# Patient Record
Sex: MALE | Race: WHITE | NOT HISPANIC OR LATINO | Employment: OTHER | URBAN - METROPOLITAN AREA
[De-identification: names, ages, dates, MRNs, and addresses within clinical notes are randomized per-mention and may not be internally consistent; named-entity substitution may affect disease eponyms.]

---

## 2017-11-10 ENCOUNTER — TELEPHONE (OUTPATIENT)
Dept: GASTROENTEROLOGY | Facility: CLINIC | Age: 67
End: 2017-11-10

## 2017-11-10 DIAGNOSIS — Z86.010 HX OF COLONIC POLYPS: Primary | ICD-10-CM

## 2017-11-16 PROBLEM — Z86.010 HX OF COLONIC POLYPS: Status: ACTIVE | Noted: 2017-11-16

## 2017-11-16 PROBLEM — Z86.0100 HX OF COLONIC POLYPS: Status: ACTIVE | Noted: 2017-11-16

## 2017-11-16 NOTE — TELEPHONE ENCOUNTER
Patient has been scheduled on 12/06/17 to arrive at 7:15am. Instructions have been mailed to them.

## 2017-12-05 ENCOUNTER — ANESTHESIA EVENT (OUTPATIENT)
Dept: PERIOP | Facility: HOSPITAL | Age: 67
End: 2017-12-05

## 2017-12-06 ENCOUNTER — HOSPITAL ENCOUNTER (OUTPATIENT)
Facility: HOSPITAL | Age: 67
Setting detail: HOSPITAL OUTPATIENT SURGERY
Discharge: HOME | End: 2017-12-06
Attending: INTERNAL MEDICINE | Admitting: INTERNAL MEDICINE

## 2017-12-06 ENCOUNTER — ANESTHESIA (OUTPATIENT)
Dept: PERIOP | Facility: HOSPITAL | Age: 67
End: 2017-12-06

## 2017-12-06 VITALS
SYSTOLIC BLOOD PRESSURE: 130 MMHG | HEIGHT: 64 IN | RESPIRATION RATE: 14 BRPM | TEMPERATURE: 97.6 F | WEIGHT: 191 LBS | HEART RATE: 40 BPM | DIASTOLIC BLOOD PRESSURE: 71 MMHG | OXYGEN SATURATION: 96 % | BODY MASS INDEX: 32.61 KG/M2

## 2017-12-06 LAB — GLUCOSE BLDC GLUCOMTR-MCNC: 101 MG/DL (ref 70–130)

## 2017-12-06 PROCEDURE — G0105 COLORECTAL SCRN; HI RISK IND: HCPCS | Performed by: INTERNAL MEDICINE

## 2017-12-06 PROCEDURE — 82962 GLUCOSE BLOOD TEST: CPT

## 2017-12-06 PROCEDURE — 25010000002 PROPOFOL 10 MG/ML EMULSION: Performed by: ANESTHESIOLOGY

## 2017-12-06 RX ORDER — LIDOCAINE HYDROCHLORIDE 10 MG/ML
0.5 INJECTION, SOLUTION EPIDURAL; INFILTRATION; INTRACAUDAL; PERINEURAL ONCE AS NEEDED
Status: COMPLETED | OUTPATIENT
Start: 2017-12-06 | End: 2017-12-06

## 2017-12-06 RX ORDER — PROPOFOL 10 MG/ML
VIAL (ML) INTRAVENOUS AS NEEDED
Status: DISCONTINUED | OUTPATIENT
Start: 2017-12-06 | End: 2017-12-06 | Stop reason: SURG

## 2017-12-06 RX ORDER — MAGNESIUM HYDROXIDE 1200 MG/15ML
LIQUID ORAL AS NEEDED
Status: DISCONTINUED | OUTPATIENT
Start: 2017-12-06 | End: 2017-12-06 | Stop reason: HOSPADM

## 2017-12-06 RX ORDER — LIDOCAINE HYDROCHLORIDE 20 MG/ML
INJECTION, SOLUTION INFILTRATION; PERINEURAL AS NEEDED
Status: DISCONTINUED | OUTPATIENT
Start: 2017-12-06 | End: 2017-12-06 | Stop reason: SURG

## 2017-12-06 RX ORDER — SODIUM CHLORIDE, SODIUM LACTATE, POTASSIUM CHLORIDE, CALCIUM CHLORIDE 600; 310; 30; 20 MG/100ML; MG/100ML; MG/100ML; MG/100ML
9 INJECTION, SOLUTION INTRAVENOUS CONTINUOUS
Status: DISCONTINUED | OUTPATIENT
Start: 2017-12-06 | End: 2017-12-06 | Stop reason: HOSPADM

## 2017-12-06 RX ORDER — SODIUM CHLORIDE 0.9 % (FLUSH) 0.9 %
1-10 SYRINGE (ML) INJECTION AS NEEDED
Status: DISCONTINUED | OUTPATIENT
Start: 2017-12-06 | End: 2017-12-06 | Stop reason: HOSPADM

## 2017-12-06 RX ORDER — GLYCOPYRROLATE 0.2 MG/ML
INJECTION INTRAMUSCULAR; INTRAVENOUS AS NEEDED
Status: DISCONTINUED | OUTPATIENT
Start: 2017-12-06 | End: 2017-12-06 | Stop reason: SURG

## 2017-12-06 RX ORDER — SODIUM CHLORIDE 9 MG/ML
40 INJECTION, SOLUTION INTRAVENOUS AS NEEDED
Status: DISCONTINUED | OUTPATIENT
Start: 2017-12-06 | End: 2017-12-06 | Stop reason: HOSPADM

## 2017-12-06 RX ADMIN — LIDOCAINE HYDROCHLORIDE 60 MG: 20 INJECTION, SOLUTION INFILTRATION; PERINEURAL at 08:33

## 2017-12-06 RX ADMIN — GLYCOPYRROLATE 0.2 MG: 0.2 INJECTION INTRAMUSCULAR; INTRAVENOUS at 08:30

## 2017-12-06 RX ADMIN — PROPOFOL 200 MG: 10 INJECTION, EMULSION INTRAVENOUS at 08:33

## 2017-12-06 RX ADMIN — LIDOCAINE HYDROCHLORIDE 0.5 ML: 10 INJECTION, SOLUTION EPIDURAL; INFILTRATION; INTRACAUDAL; PERINEURAL at 07:28

## 2017-12-06 RX ADMIN — SODIUM CHLORIDE, POTASSIUM CHLORIDE, SODIUM LACTATE AND CALCIUM CHLORIDE 9 ML/HR: 600; 310; 30; 20 INJECTION, SOLUTION INTRAVENOUS at 07:31

## 2017-12-06 NOTE — ANESTHESIA POSTPROCEDURE EVALUATION
Patient: NATIVIDAD Fox    Procedure Summary     Date Anesthesia Start Anesthesia Stop Room / Location    12/06/17 0829 0900 [facility] / [facility]       Procedure Diagnosis Surgeon Provider    COLONOSCOPY (N/A ) Hx of colonic polyps  (Hx of colonic polyps [Z86.010]) MD Lisbeth March MD          Anesthesia Type: MAC  Last vitals  BP   130/71 (12/06/17 0930)   Temp   97.6 °F (36.4 °C) (12/06/17 0902)   Pulse   (!) 40 (12/06/17 0930)   Resp   14 (12/06/17 0930)     SpO2   96 % (12/06/17 0930)     Post Anesthesia Care and Evaluation    Patient location during evaluation: PHASE II  Patient participation: complete - patient participated  Level of consciousness: awake and alert  Pain score: 0  Pain management: adequate  Airway patency: patent  Anesthetic complications: No anesthetic complications  PONV Status: none  Cardiovascular status: acceptable  Respiratory status: acceptable  Hydration status: acceptable

## 2017-12-06 NOTE — OP NOTE
Colonoscopy Note:    Indication: History of colon polyps      Consent: Procedure colonoscopy was explained to the patient and detail including but not limited to the, patient of bleeding perforation and possible reactions to sedation.    Sedation: Sedation was provided by anesthesia.    Procedure:  After excellent sedation a digital rectal examinations performed and a flexible colonoscope was inserted into the rectum passed to the cecum.  The cecum was identified by both the ileocecal valve and the appendiceal orifice.  The overall bowel preparation was good.  Upon withdrawal scope careful examination mucosa was made.  Pertinent findings include no polyps are noted.  The scope was slowly withdrawn to the rectum and retroflex were internal hemorrhoids are noted.  The scope was straightened and withdrawn out of the patient with no immediate complications and he tolerated the procedure well.    Impression/Plan:  No polyps  Internal hemorrhoids  He can keep her screening at every 5 years due to his personal history of polyps

## 2017-12-06 NOTE — PLAN OF CARE
Problem: Patient Care Overview (Adult)  Goal: Plan of Care Review  Outcome: Ongoing (interventions implemented as appropriate)    12/06/17 0743   Coping/Psychosocial Response Interventions   Plan Of Care Reviewed With patient;spouse   Outcome Evaluation   Outcome Summary/Follow up Plan vss, awaiting procedure       Goal: Adult Individualization and Mutuality  Outcome: Ongoing (interventions implemented as appropriate)    Problem: GI Endoscopy (Adult)  Goal: Signs and Symptoms of Listed Potential Problems Will be Absent or Manageable (GI Endoscopy)  Outcome: Ongoing (interventions implemented as appropriate)

## 2017-12-06 NOTE — PLAN OF CARE
Problem: Patient Care Overview (Adult)  Goal: Plan of Care Review  Outcome: Outcome(s) achieved Date Met:  12/06/17 12/06/17 1006   Coping/Psychosocial Response Interventions   Plan Of Care Reviewed With patient   Outcome Evaluation   Outcome Summary/Follow up Plan vss       Goal: Adult Individualization and Mutuality  Outcome: Outcome(s) achieved Date Met:  12/06/17    Problem: GI Endoscopy (Adult)  Goal: Signs and Symptoms of Listed Potential Problems Will be Absent or Manageable (GI Endoscopy)  Outcome: Outcome(s) achieved Date Met:  12/06/17

## 2017-12-06 NOTE — PLAN OF CARE
Problem: GI Endoscopy (Adult)  Goal: Signs and Symptoms of Listed Potential Problems Will be Absent or Manageable (GI Endoscopy)  Outcome: Ongoing (interventions implemented as appropriate)    12/06/17 0811   GI Endoscopy   Problems Assessed (GI Endoscopy) all   Problems Present (GI Endoscopy) none

## 2017-12-06 NOTE — H&P
[facility]  Pre-procedure H&P    Chief Complaint:screening colon    NATIVIDAD Fox is a 67 y.o. male  who is referred by Rosalia James MD for a colonoscopy. He is an Asymptomatic person Age 50 years and older who has a history of screening for colon cancer.   He has had colonoscopy 10 years ago without abnormalities..    He denies any change in bowel function, melena, hematochezia or unexplained weight loss.    Past Medical History:   Diagnosis Date   • Colon polyp    • Diabetes mellitus        Past Surgical History:   Procedure Laterality Date   • COLONOSCOPY     • FRACTURE SURGERY Left 1972    FX: Femur   • TONSILLECTOMY         Family History   Problem Relation Age of Onset   • Colon cancer Neg Hx    • Colon polyps Neg Hx        Social History     Social History   • Marital status:      Spouse name: N/A   • Number of children: N/A   • Years of education: N/A     Occupational History   • Not on file.     Social History Main Topics   • Smoking status: Former Smoker   • Smokeless tobacco: Never Used   • Alcohol use Yes      Comment: social minimal   • Drug use: No   • Sexual activity: Defer     Other Topics Concern   • Not on file     Social History Narrative         Current Facility-Administered Medications:   •  lactated ringers infusion, 9 mL/hr, Intravenous, Continuous, Leonides Ewing CRNA, Last Rate: 9 mL/hr at 12/06/17 0731, 9 mL/hr at 12/06/17 0731  •  sodium chloride 0.9 % flush 1-10 mL, 1-10 mL, Intravenous, PRN, Leonides Ewing CRNA  •  sodium chloride 0.9 % infusion 40 mL, 40 mL, Intravenous, PRN, Leonides Ewing CRNA  •  sterile water 1,000 mL with simethicone 40 MG/0.6ML 3 mL mixture, , , PRN, Rosalia James MD, 150 mL at 12/06/17 0747  •  sterile water irrigation solution, , , PRN, Rosalia James MD, 500 mL at 12/06/17 0747    No Known Allergies         Vitals:    12/06/17 0715   BP: 93/73   Pulse: (!) 40   Resp: 16   Temp: 98.1 °F (36.7 °C)    SpO2: 96%       Physical Exam:   General: patient awake, alert and cooperative   Eyes: Normal lids and lashes, no scleral icterus   Neck: supple, normal ROM   Skin: warm and dry, not jaundiced   Cardiovascular: regular rhythm and rate, no murmurs auscultated   Pulm: clear to auscultation bilaterally, regular and unlabored   Abdomen: soft, nontender, nondistended; normal bowel sounds   Extremities: no rash or edema   Psychiatric: Normal mood and behavior         Assessment/Plan       [unfilled]    Schedule for colonoscopy.      Indications, risks and procedure were discussed with the patient, including but not limited to, bleeding, infection, possibility of perforation and possible polypectomy. All of the patient's questions were answered, and signed informed consent was obtained and placed on the chart.         Rosalia James MD  [facility]  [unfilled]

## 2017-12-06 NOTE — PLAN OF CARE
Problem: Patient Care Overview (Adult)  Goal: Plan of Care Review  Outcome: Ongoing (interventions implemented as appropriate)    12/06/17 0905   Coping/Psychosocial Response Interventions   Plan Of Care Reviewed With patient;spouse   Outcome Evaluation   Outcome Summary/Follow up Plan vss,passing air, tolerating po fluids   Patient Care Overview   Progress improving       Goal: Adult Individualization and Mutuality  Outcome: Ongoing (interventions implemented as appropriate)    Problem: GI Endoscopy (Adult)  Goal: Signs and Symptoms of Listed Potential Problems Will be Absent or Manageable (GI Endoscopy)  Outcome: Ongoing (interventions implemented as appropriate)

## 2017-12-06 NOTE — ANESTHESIA PREPROCEDURE EVALUATION
Anesthesia Evaluation     no history of anesthetic complications:  NPO Solid Status: > 8 hours  NPO Liquid Status: > 8 hours     Airway   Mallampati: II  TM distance: >3 FB  Neck ROM: full  Narrow palate  Dental - normal exam     Pulmonary - normal exam    breath sounds clear to auscultation  (+) a smoker (none for 40 years) Former,   Sleep apnea: snores.  Cardiovascular   Exercise tolerance: good (4-7 METS)    Rhythm: regular  Rate: abnormal    (+) hypertension, dysrhythmias (rate runs in the 40's) Bradycardia, hyperlipidemia      Neuro/Psych  GI/Hepatic/Renal/Endo    (+)  diabetes mellitus type 2 well controlled,     Musculoskeletal (-) negative ROS    Back pain: occ.  Abdominal    Substance History - negative use  Alcohol use: social-occ.     OB/GYN negative ob/gyn ROS         Other - negative ROS                                     Anesthesia Plan    ASA 2     MAC     intravenous induction   Anesthetic plan and risks discussed with patient and spouse/significant other.

## 2020-12-07 ENCOUNTER — OFFICE VISIT (OUTPATIENT)
Dept: INTERNAL MEDICINE | Facility: CLINIC | Age: 70
End: 2020-12-07

## 2020-12-07 VITALS
DIASTOLIC BLOOD PRESSURE: 74 MMHG | SYSTOLIC BLOOD PRESSURE: 122 MMHG | OXYGEN SATURATION: 96 % | WEIGHT: 185.6 LBS | RESPIRATION RATE: 16 BRPM | TEMPERATURE: 97.1 F | HEIGHT: 73 IN | HEART RATE: 69 BPM | BODY MASS INDEX: 24.6 KG/M2

## 2020-12-07 DIAGNOSIS — R35.0 BENIGN PROSTATIC HYPERPLASIA WITH URINARY FREQUENCY: ICD-10-CM

## 2020-12-07 DIAGNOSIS — Z79.4 TYPE 2 DIABETES MELLITUS WITH HYPOGLYCEMIA WITHOUT COMA, WITH LONG-TERM CURRENT USE OF INSULIN (HCC): Primary | ICD-10-CM

## 2020-12-07 DIAGNOSIS — E11.649 TYPE 2 DIABETES MELLITUS WITH HYPOGLYCEMIA WITHOUT COMA, WITH LONG-TERM CURRENT USE OF INSULIN (HCC): Primary | ICD-10-CM

## 2020-12-07 DIAGNOSIS — N40.1 BENIGN PROSTATIC HYPERPLASIA WITH URINARY FREQUENCY: ICD-10-CM

## 2020-12-07 DIAGNOSIS — R35.1 NOCTURIA: ICD-10-CM

## 2020-12-07 PROBLEM — K28.9 GASTROINTESTINAL ULCER DUE TO HELICOBACTER PYLORI: Status: ACTIVE | Noted: 2020-12-07

## 2020-12-07 PROBLEM — B96.81 GASTROINTESTINAL ULCER DUE TO HELICOBACTER PYLORI: Status: ACTIVE | Noted: 2020-12-07

## 2020-12-07 PROBLEM — IMO0001 CAN'T GET FOOD DOWN: Status: ACTIVE | Noted: 2020-12-07

## 2020-12-07 PROBLEM — K21.9 ACID REFLUX: Status: ACTIVE | Noted: 2020-12-07

## 2020-12-07 PROCEDURE — 99214 OFFICE O/P EST MOD 30 MIN: CPT | Performed by: INTERNAL MEDICINE

## 2020-12-07 RX ORDER — LISINOPRIL 5 MG/1
5 TABLET ORAL DAILY
COMMUNITY
Start: 2020-10-17 | End: 2021-04-02 | Stop reason: SDUPTHER

## 2020-12-07 RX ORDER — TERAZOSIN 10 MG/1
10 CAPSULE ORAL NIGHTLY
COMMUNITY
Start: 2020-10-17 | End: 2021-04-02

## 2020-12-07 RX ORDER — FINASTERIDE 5 MG/1
5 TABLET, FILM COATED ORAL DAILY
COMMUNITY
Start: 2020-10-16 | End: 2021-04-02 | Stop reason: SDUPTHER

## 2020-12-07 RX ORDER — METFORMIN HYDROCHLORIDE 750 MG/1
TABLET, EXTENDED RELEASE ORAL
COMMUNITY
Start: 2020-10-17 | End: 2021-04-02 | Stop reason: SDUPTHER

## 2020-12-07 NOTE — PROGRESS NOTES
Subjective   Rupesh Fox is a 70 y.o. male.     Here to follow up BPH; doing well since last visit; stareted on finasteride in addition to terazosin; doing well with current meds for DM, no side effects; no chest pain, sob, toelrating withtout issues; A1C last visit in 10/2020 was 6.1         The following portions of the patient's history were reviewed and updated as appropriate: allergies, current medications, past family history, past medical history, past social history, past surgical history and problem list.    Review of Systems   Constitutional: Negative for chills, fever, unexpected weight gain and unexpected weight loss.   HENT: Negative for congestion, rhinorrhea and sinus pressure.    Eyes: Negative for blurred vision and visual disturbance.   Respiratory: Negative for cough and shortness of breath.    Cardiovascular: Negative for chest pain, palpitations and leg swelling.   Gastrointestinal: Negative for abdominal pain and nausea.   Endocrine: Negative for cold intolerance and heat intolerance.   Genitourinary: Negative for dysuria and frequency.   Musculoskeletal: Negative for arthralgias and joint swelling.   Neurological: Negative for weakness and confusion.   Psychiatric/Behavioral: Negative for depressed mood. The patient is not nervous/anxious.        Objective   Physical Exam  Constitutional:       General: He is not in acute distress.     Appearance: Normal appearance.   HENT:      Head: Normocephalic and atraumatic.      Right Ear: External ear normal.      Left Ear: External ear normal.      Nose: Nose normal.      Mouth/Throat:      Mouth: Mucous membranes are moist.      Pharynx: Oropharynx is clear.   Eyes:      Extraocular Movements: Extraocular movements intact.      Conjunctiva/sclera: Conjunctivae normal.      Pupils: Pupils are equal, round, and reactive to light.   Neck:      Musculoskeletal: Normal range of motion and neck supple.   Cardiovascular:      Rate and Rhythm: Normal rate  and regular rhythm.      Pulses: Normal pulses.      Heart sounds: Normal heart sounds. No murmur. No gallop.    Pulmonary:      Effort: Pulmonary effort is normal.      Breath sounds: Normal breath sounds.   Abdominal:      General: Abdomen is flat. Bowel sounds are normal. There is no distension.      Palpations: Abdomen is soft. There is no mass.      Tenderness: There is no abdominal tenderness.   Musculoskeletal: Normal range of motion.         General: No swelling.   Skin:     General: Skin is warm and dry.      Findings: No rash.   Neurological:      General: No focal deficit present.      Mental Status: He is alert and oriented to person, place, and time. Mental status is at baseline.   Psychiatric:         Mood and Affect: Mood normal.         Behavior: Behavior normal.           Assessment/Plan   Diagnoses and all orders for this visit:    1. Type 2 diabetes mellitus with hypoglycemia without coma, with long-term current use of insulin (CMS/[facility]) (Primary)    2. Benign prostatic hyperplasia with urinary frequency    3. Nocturia        Current Outpatient Medications:   •  finasteride (PROSCAR) 5 MG tablet, Take 5 mg by mouth Daily., Disp: , Rfl:   •  lisinopril (PRINIVIL,ZESTRIL) 5 MG tablet, , Disp: , Rfl:   •  metFORMIN ER (GLUCOPHAGE-XR) 750 MG 24 hr tablet, , Disp: , Rfl:   •  simvastatin (ZOCOR) 20 MG tablet, Take 20 mg by mouth Every Night., Disp: , Rfl:   •  terazosin (HYTRIN) 10 MG capsule, , Disp: , Rfl:   •  lisinopril (PRINIVIL,ZESTRIL) 20 MG tablet, Take 20 mg by mouth Daily., Disp: , Rfl:   •  metFORMIN (GLUCOPHAGE) 1000 MG tablet, Take 1,000 mg by mouth 2 (Two) Times a Day With Meals., Disp: , Rfl:   •  terazosin (HYTRIN) 5 MG capsule, Take 5 mg by mouth Every Night., Disp: , Rfl:     - dm, continue current meds at goal; counseled on risks and benefits  - bph/nocturia, continue terazosin and finasteride, counseled on meds and reviewed, discussed time to results from addition of finasteride;  discussed procedural intervention if  Needed  - rtc 6-8 weeks

## 2021-04-02 ENCOUNTER — OFFICE VISIT (OUTPATIENT)
Dept: INTERNAL MEDICINE | Facility: CLINIC | Age: 71
End: 2021-04-02

## 2021-04-02 VITALS
HEIGHT: 73 IN | RESPIRATION RATE: 18 BRPM | BODY MASS INDEX: 25.12 KG/M2 | WEIGHT: 189.5 LBS | OXYGEN SATURATION: 98 % | TEMPERATURE: 97.6 F | SYSTOLIC BLOOD PRESSURE: 130 MMHG | DIASTOLIC BLOOD PRESSURE: 72 MMHG | HEART RATE: 72 BPM

## 2021-04-02 DIAGNOSIS — Z11.59 NEED FOR HEPATITIS B SCREENING TEST: ICD-10-CM

## 2021-04-02 DIAGNOSIS — I10 ESSENTIAL HYPERTENSION: ICD-10-CM

## 2021-04-02 DIAGNOSIS — Z79.4 TYPE 2 DIABETES MELLITUS WITH HYPOGLYCEMIA WITHOUT COMA, WITH LONG-TERM CURRENT USE OF INSULIN (HCC): Primary | ICD-10-CM

## 2021-04-02 DIAGNOSIS — N40.1 BENIGN PROSTATIC HYPERPLASIA WITH URINARY FREQUENCY: ICD-10-CM

## 2021-04-02 DIAGNOSIS — R35.0 BENIGN PROSTATIC HYPERPLASIA WITH URINARY FREQUENCY: ICD-10-CM

## 2021-04-02 DIAGNOSIS — Z11.59 ENCOUNTER FOR HCV SCREENING TEST FOR LOW RISK PATIENT: ICD-10-CM

## 2021-04-02 DIAGNOSIS — E11.649 TYPE 2 DIABETES MELLITUS WITH HYPOGLYCEMIA WITHOUT COMA, WITH LONG-TERM CURRENT USE OF INSULIN (HCC): Primary | ICD-10-CM

## 2021-04-02 DIAGNOSIS — E78.2 MIXED HYPERLIPIDEMIA: ICD-10-CM

## 2021-04-02 PROCEDURE — 99214 OFFICE O/P EST MOD 30 MIN: CPT | Performed by: INTERNAL MEDICINE

## 2021-04-02 RX ORDER — METFORMIN HYDROCHLORIDE 750 MG/1
750 TABLET, EXTENDED RELEASE ORAL
Qty: 90 TABLET | Refills: 2 | Status: SHIPPED | OUTPATIENT
Start: 2021-04-02 | End: 2021-07-02 | Stop reason: SDUPTHER

## 2021-04-02 RX ORDER — LISINOPRIL 5 MG/1
5 TABLET ORAL DAILY
Qty: 90 TABLET | Refills: 2 | Status: SHIPPED | OUTPATIENT
Start: 2021-04-02 | End: 2021-07-02 | Stop reason: SDUPTHER

## 2021-04-02 RX ORDER — FINASTERIDE 5 MG/1
5 TABLET, FILM COATED ORAL DAILY
Qty: 90 TABLET | Refills: 2 | Status: SHIPPED | OUTPATIENT
Start: 2021-04-02 | End: 2021-07-02 | Stop reason: SDUPTHER

## 2021-04-02 RX ORDER — SIMVASTATIN 20 MG
20 TABLET ORAL NIGHTLY
Qty: 90 TABLET | Refills: 2 | Status: SHIPPED | OUTPATIENT
Start: 2021-04-02 | End: 2021-07-02 | Stop reason: SDUPTHER

## 2021-04-02 NOTE — PROGRESS NOTES
"Chief Complaint  Diabetes (Follow up ) and Med Refill    Subjective          Rupesh Fox presents to [facility]  DM, doing well without issues; no chest pain, sob, headcahes, vision changes; glucose running near 120 daily in the morning    HTN, at goal, no chest pain, sob, no lows    BPH, taking finasteride and terazosin, doing well; not sure if the terazosin helped him much as compared to the finasteride      Objective   Vital Signs:   /72 (BP Location: Left arm, Patient Position: Sitting, Cuff Size: Adult)   Pulse 72   Temp 97.6 °F (36.4 °C)   Resp 18   Ht 185.4 cm (73\")   Wt 86 kg (189 lb 8 oz)   SpO2 98%   BMI 25.00 kg/m²     Physical Exam  Vitals and nursing note reviewed.   Constitutional:       General: He is not in acute distress.     Appearance: Normal appearance.   HENT:      Head: Normocephalic and atraumatic.      Right Ear: External ear normal.      Left Ear: External ear normal.      Nose: Nose normal.      Mouth/Throat:      Mouth: Mucous membranes are moist.      Pharynx: Oropharynx is clear.   Eyes:      Extraocular Movements: Extraocular movements intact.      Conjunctiva/sclera: Conjunctivae normal.      Pupils: Pupils are equal, round, and reactive to light.   Cardiovascular:      Rate and Rhythm: Normal rate and regular rhythm.      Pulses: Normal pulses.      Heart sounds: Normal heart sounds. No murmur heard.   No gallop.    Pulmonary:      Effort: Pulmonary effort is normal.      Breath sounds: Normal breath sounds.   Abdominal:      General: Abdomen is flat. Bowel sounds are normal. There is no distension.      Palpations: Abdomen is soft. There is no mass.      Tenderness: There is no abdominal tenderness.   Musculoskeletal:         General: No swelling. Normal range of motion.      Cervical back: Normal range of motion and neck supple.   Skin:     General: Skin is warm and dry.      Findings: No rash.   Neurological:      " General: No focal deficit present.      Mental Status: He is alert and oriented to person, place, and time. Mental status is at baseline.   Psychiatric:         Mood and Affect: Mood normal.         Behavior: Behavior normal.        Result Review :                 Assessment and Plan    Diagnoses and all orders for this visit:    1. Type 2 diabetes mellitus with hypoglycemia without coma, with long-term current use of insulin (CMS/[facility]) (Primary)  -     simvastatin (ZOCOR) 20 MG tablet; Take 1 tablet by mouth Every Night.  Dispense: 90 tablet; Refill: 2  -     metFORMIN ER (GLUCOPHAGE-XR) 750 MG 24 hr tablet; Take 1 tablet by mouth Daily With Breakfast.  Dispense: 90 tablet; Refill: 2  -     Comprehensive Metabolic Panel  -     Hemoglobin A1c  -     Microalbumin / Creatinine Urine Ratio - Urine, Clean Catch  -     Lipid Panel    - feet ok 4/2/21  - lipids on statin, check labs  - eyes yearly recommended  - gaol a1c <7, metofrmin 750 er daily  - check microalbumin, on ace  - get vaccine records for ppsv23, check hepB titers today    2. Benign prostatic hyperplasia with urinary frequency  -     finasteride (PROSCAR) 5 MG tablet; Take 1 tablet by mouth Daily.  Dispense: 90 tablet; Refill: 2    - stop terazosin, discussed risks and benefits, if BPH symptoms retrun will add back    3. Essential hypertension  -     lisinopril (PRINIVIL,ZESTRIL) 5 MG tablet; Take 1 tablet by mouth Daily.  Dispense: 90 tablet; Refill: 2    4. Mixed hyperlipidemia  -     simvastatin (ZOCOR) 20 MG tablet; Take 1 tablet by mouth Every Night.  Dispense: 90 tablet; Refill: 2        Follow Up   No follow-ups on file.  Patient was given instructions and counseling regarding his condition or for health maintenance advice. Please see specific information pulled into the AVS if appropriate.

## 2021-04-03 LAB
ALBUMIN SERPL-MCNC: 4.5 G/DL (ref 3.5–5.2)
ALBUMIN/CREAT UR: 4 MG/G CREAT (ref 0–29)
ALBUMIN/GLOB SERPL: 2 G/DL
ALP SERPL-CCNC: 66 U/L (ref 39–117)
ALT SERPL-CCNC: 23 U/L (ref 1–41)
AST SERPL-CCNC: 25 U/L (ref 1–40)
BILIRUB SERPL-MCNC: 0.8 MG/DL (ref 0–1.2)
BUN SERPL-MCNC: 22 MG/DL (ref 8–23)
BUN/CREAT SERPL: 20.4 (ref 7–25)
CALCIUM SERPL-MCNC: 9.6 MG/DL (ref 8.6–10.5)
CHLORIDE SERPL-SCNC: 106 MMOL/L (ref 98–107)
CHOLEST SERPL-MCNC: 111 MG/DL (ref 0–200)
CO2 SERPL-SCNC: 27.8 MMOL/L (ref 22–29)
CREAT SERPL-MCNC: 1.08 MG/DL (ref 0.76–1.27)
CREAT UR-MCNC: 178.4 MG/DL
GLOBULIN SER CALC-MCNC: 2.2 GM/DL
GLUCOSE SERPL-MCNC: 133 MG/DL (ref 65–99)
HBA1C MFR BLD: 6.2 % (ref 4.8–5.6)
HBV SURFACE AB SER-ACNC: <3.1 MIU/ML
HBV SURFACE AG SERPL QL IA: NEGATIVE
HCV AB S/CO SERPL IA: <0.1 S/CO RATIO (ref 0–0.9)
HDLC SERPL-MCNC: 61 MG/DL (ref 40–60)
LDLC SERPL CALC-MCNC: 40 MG/DL (ref 0–100)
MICROALBUMIN UR-MCNC: 6.4 UG/ML
POTASSIUM SERPL-SCNC: 5.1 MMOL/L (ref 3.5–5.2)
PROT SERPL-MCNC: 6.7 G/DL (ref 6–8.5)
SODIUM SERPL-SCNC: 143 MMOL/L (ref 136–145)
TRIGL SERPL-MCNC: 38 MG/DL (ref 0–150)
VLDLC SERPL CALC-MCNC: 10 MG/DL (ref 5–40)

## 2021-04-09 ENCOUNTER — TELEPHONE (OUTPATIENT)
Dept: INTERNAL MEDICINE | Facility: CLINIC | Age: 71
End: 2021-04-09

## 2021-04-09 NOTE — TELEPHONE ENCOUNTER
Caller: Rupesh Fox    Relationship: Self    Best call back number: [phone] (M)    What is the best time to reach you: ANYTIME DURING THE DAY    Who are you requesting to speak with (clinical staff, provider,  specific staff member): DR. MATHUR    Do you know the name of the person who called:     What was the call regarding: THE CALL HE RECEIVED FROM DR. MATHUR YESTERDAY. PATIENT STATES THAT HE DID SEE YOUR MESSAGE ON HIS MY CHART AND IF YOU STILL NEED TO TALK TO HIM PLEASE CALL HIM.     Do you require a callback: YES        THANKS

## 2021-04-14 DIAGNOSIS — N40.1 BENIGN PROSTATIC HYPERPLASIA WITH URINARY FREQUENCY: Primary | ICD-10-CM

## 2021-04-14 DIAGNOSIS — R35.0 BENIGN PROSTATIC HYPERPLASIA WITH URINARY FREQUENCY: Primary | ICD-10-CM

## 2021-04-14 RX ORDER — TERAZOSIN 10 MG/1
10 CAPSULE ORAL NIGHTLY
Qty: 90 CAPSULE | Refills: 2 | Status: SHIPPED | OUTPATIENT
Start: 2021-04-14 | End: 2021-07-02 | Stop reason: SDUPTHER

## 2021-04-14 RX ORDER — TERAZOSIN 10 MG/1
10 CAPSULE ORAL NIGHTLY
COMMUNITY
End: 2021-04-14 | Stop reason: SDUPTHER

## 2021-04-14 NOTE — TELEPHONE ENCOUNTER
See below message    TERAZOSIN - 10MG     When do you need the refill by: 04/14/21     What additional details did the patient provide when requesting the medication: PATIENT STATED THAT HE HAS BEEN ON THIS MEDICATION FOR YEARS TO HELP CONTROL FREQUENT URINATION. PATIENT STATED THAT IT WAS THE COMBINATION OF MEDICATION THAT WAS WORKING WELL, AND BEING OFF OF THIS MEDICATION HAS CAUSED THE ISSUE TO ARISE AGAIN.    I put in in a refill request order

## 2021-04-14 NOTE — TELEPHONE ENCOUNTER
Caller: Rupesh Fox    Relationship: Self    Best call back number: [phone]     Medication needed:   Requested Prescriptions      No prescriptions requested or ordered in this encounter   TERAZOSIN - 10MG    When do you need the refill by: 04/14/21    What additional details did the patient provide when requesting the medication: PATIENT STATED THAT HE HAS BEEN ON THIS MEDICATION FOR YEARS TO HELP CONTROL FREQUENT URINATION. PATIENT STATED THAT IT WAS THE COMBINATION OF MEDICATION THAT WAS WORKING WELL, AND BEING OFF OF THIS MEDICATION HAS CAUSED THE ISSUE TO ARISE AGAIN.    Does the patient have less than a 3 day supply:  [x] Yes  [] No    What is the patient's preferred pharmacy: [facility] - [facility] [address] [facility] - [phone] [facility] [phone] FX

## 2021-07-02 ENCOUNTER — OFFICE VISIT (OUTPATIENT)
Dept: INTERNAL MEDICINE | Facility: CLINIC | Age: 71
End: 2021-07-02

## 2021-07-02 VITALS
SYSTOLIC BLOOD PRESSURE: 122 MMHG | DIASTOLIC BLOOD PRESSURE: 70 MMHG | BODY MASS INDEX: 24.78 KG/M2 | HEIGHT: 73 IN | HEART RATE: 47 BPM | TEMPERATURE: 97.8 F | RESPIRATION RATE: 18 BRPM | OXYGEN SATURATION: 99 % | WEIGHT: 187 LBS

## 2021-07-02 DIAGNOSIS — Z79.4 TYPE 2 DIABETES MELLITUS WITH HYPOGLYCEMIA WITHOUT COMA, WITH LONG-TERM CURRENT USE OF INSULIN (HCC): ICD-10-CM

## 2021-07-02 DIAGNOSIS — Z23 ENCOUNTER FOR IMMUNIZATION: ICD-10-CM

## 2021-07-02 DIAGNOSIS — R00.1 BRADYCARDIA: Primary | ICD-10-CM

## 2021-07-02 DIAGNOSIS — N40.1 BENIGN PROSTATIC HYPERPLASIA WITH URINARY FREQUENCY: ICD-10-CM

## 2021-07-02 DIAGNOSIS — E11.649 TYPE 2 DIABETES MELLITUS WITH HYPOGLYCEMIA WITHOUT COMA, WITH LONG-TERM CURRENT USE OF INSULIN (HCC): ICD-10-CM

## 2021-07-02 DIAGNOSIS — E78.2 MIXED HYPERLIPIDEMIA: ICD-10-CM

## 2021-07-02 DIAGNOSIS — I10 ESSENTIAL HYPERTENSION: ICD-10-CM

## 2021-07-02 DIAGNOSIS — R35.0 BENIGN PROSTATIC HYPERPLASIA WITH URINARY FREQUENCY: ICD-10-CM

## 2021-07-02 PROCEDURE — G0010 ADMIN HEPATITIS B VACCINE: HCPCS | Performed by: INTERNAL MEDICINE

## 2021-07-02 PROCEDURE — 99214 OFFICE O/P EST MOD 30 MIN: CPT | Performed by: INTERNAL MEDICINE

## 2021-07-02 PROCEDURE — 93000 ELECTROCARDIOGRAM COMPLETE: CPT | Performed by: INTERNAL MEDICINE

## 2021-07-02 PROCEDURE — 90746 HEPB VACCINE 3 DOSE ADULT IM: CPT | Performed by: INTERNAL MEDICINE

## 2021-07-02 RX ORDER — SIMVASTATIN 20 MG
20 TABLET ORAL NIGHTLY
Qty: 90 TABLET | Refills: 2 | Status: SHIPPED | OUTPATIENT
Start: 2021-07-02 | End: 2021-10-06 | Stop reason: SDUPTHER

## 2021-07-02 RX ORDER — METFORMIN HYDROCHLORIDE 750 MG/1
750 TABLET, EXTENDED RELEASE ORAL
Qty: 90 TABLET | Refills: 2 | Status: SHIPPED | OUTPATIENT
Start: 2021-07-02 | End: 2021-10-06 | Stop reason: SDUPTHER

## 2021-07-02 RX ORDER — FINASTERIDE 5 MG/1
5 TABLET, FILM COATED ORAL DAILY
Qty: 90 TABLET | Refills: 2 | Status: SHIPPED | OUTPATIENT
Start: 2021-07-02 | End: 2021-10-06 | Stop reason: SDUPTHER

## 2021-07-02 RX ORDER — TERAZOSIN 10 MG/1
10 CAPSULE ORAL NIGHTLY
Qty: 90 CAPSULE | Refills: 2 | Status: SHIPPED | OUTPATIENT
Start: 2021-07-02 | End: 2021-10-06 | Stop reason: SDUPTHER

## 2021-07-02 RX ORDER — LISINOPRIL 5 MG/1
5 TABLET ORAL DAILY
Qty: 90 TABLET | Refills: 2 | Status: SHIPPED | OUTPATIENT
Start: 2021-07-02 | End: 2021-10-06 | Stop reason: SDUPTHER

## 2021-07-02 NOTE — PROGRESS NOTES
"Chief Complaint  Diabetes (follow up )     Subjective          Rupesh Fox presents to [facility]  DM, doing well, recent morning glucose has been in 130s as opposed to 115s, no lows, no chest pain, sob, dizziness    Bradycardia, no issues, no dizziness, no syncope, today at 46 in clinic    BPH, tried stopping terazosin last visit, unable to do this as he started having weak stream issues, restarted this med      Objective   Vital Signs:   /70 (BP Location: Left arm, Patient Position: Sitting, Cuff Size: Adult)   Pulse (!) 47   Temp 97.8 °F (36.6 °C)   Resp 18   Ht 185.4 cm (73\")   Wt 84.8 kg (187 lb)   SpO2 99%   BMI 24.67 kg/m²     Physical Exam  Vitals and nursing note reviewed.   Constitutional:       General: He is not in acute distress.     Appearance: Normal appearance.   HENT:      Head: Normocephalic and atraumatic.      Right Ear: External ear normal.      Left Ear: External ear normal.      Nose: Nose normal.      Mouth/Throat:      Mouth: Mucous membranes are moist.      Pharynx: Oropharynx is clear.   Eyes:      Extraocular Movements: Extraocular movements intact.      Conjunctiva/sclera: Conjunctivae normal.      Pupils: Pupils are equal, round, and reactive to light.   Cardiovascular:      Rate and Rhythm: Normal rate and regular rhythm.      Pulses: Normal pulses.      Heart sounds: Normal heart sounds. No murmur heard.   No gallop.    Pulmonary:      Effort: Pulmonary effort is normal.      Breath sounds: Normal breath sounds.   Abdominal:      General: Abdomen is flat. Bowel sounds are normal. There is no distension.      Palpations: Abdomen is soft. There is no mass.      Tenderness: There is no abdominal tenderness.   Musculoskeletal:         General: No swelling. Normal range of motion.      Cervical back: Normal range of motion and neck supple.   Skin:     General: Skin is warm and dry.      Findings: No rash.   Neurological:      " General: No focal deficit present.      Mental Status: He is alert and oriented to person, place, and time. Mental status is at baseline.   Psychiatric:         Mood and Affect: Mood normal.         Behavior: Behavior normal.      Diabetic foot exam:   Left: Filament test present   Pulses Dorsalis Pedis:  present   Reflexes 2+    Vibratory sensation normal   Proprioception normal   Sharp/dull discrimination normal       Right: Filament test present   Pulses Dorsalis Pedis:  present   Reflexes 2+    Vibratory sensation normal   Proprioception normal   Sharp/dull discrimination normal      Result Review :            ECG 12 Lead    Date/Time: 7/2/2021 8:49 AM  Performed by: Moreno Martinez MD  Authorized by: Moreno Martinez MD   Comparison: compared with previous ECG from 10/13/2017  Comparison to previous ECG: same  Rhythm: sinus bradycardia  Rate: normal  Conduction: conduction normal  ST Segments: ST segments normal  T Waves: T waves normal  QRS axis: normal    Clinical impression: normal ECG              Assessment and Plan    Diagnoses and all orders for this visit:    1. Bradycardia (Primary)  -     ECG 12 Lead    2. Benign prostatic hyperplasia with urinary frequency  -     terazosin (HYTRIN) 10 MG capsule; Take 1 capsule by mouth Every Night.  Dispense: 90 capsule; Refill: 2  -     finasteride (PROSCAR) 5 MG tablet; Take 1 tablet by mouth Daily.  Dispense: 90 tablet; Refill: 2    3. Type 2 diabetes mellitus with hypoglycemia without coma, with long-term current use of insulin (CMS/[facility])  -     Comprehensive Metabolic Panel  -     Hemoglobin A1c  -     Microalbumin / Creatinine Urine Ratio - Urine, Clean Catch  -     Lipid Panel  -     metFORMIN ER (GLUCOPHAGE-XR) 750 MG 24 hr tablet; Take 1 tablet by mouth Daily With Breakfast.  Dispense: 90 tablet; Refill: 2  -     Hepatitis B Vaccine Adult IM - Engerix    - feet ok 4/2/21  - lipids on statin, check labs  - eyes yearly recommended  - gaol a1c <7,  metofrmin 750 er daily  - check microalbumin, on ace  - get vaccine records for ppsv23, start HepB vaccine today    4. Encounter for immunization   -     Hepatitis B Vaccine Adult IM - Engerix        Follow Up   No follow-ups on file.  Patient was given instructions and counseling regarding his condition or for health maintenance advice. Please see specific information pulled into the AVS if appropriate.

## 2021-07-03 LAB
ALBUMIN SERPL-MCNC: 4.6 G/DL (ref 3.5–5.2)
ALBUMIN/CREAT UR: 2 MG/G CREAT (ref 0–29)
ALBUMIN/GLOB SERPL: 2.6 G/DL
ALP SERPL-CCNC: 61 U/L (ref 39–117)
ALT SERPL-CCNC: 15 U/L (ref 1–41)
AST SERPL-CCNC: 15 U/L (ref 1–40)
BILIRUB SERPL-MCNC: 0.6 MG/DL (ref 0–1.2)
BUN SERPL-MCNC: 22 MG/DL (ref 8–23)
BUN/CREAT SERPL: 21.2 (ref 7–25)
CALCIUM SERPL-MCNC: 9.5 MG/DL (ref 8.6–10.5)
CHLORIDE SERPL-SCNC: 106 MMOL/L (ref 98–107)
CHOLEST SERPL-MCNC: 129 MG/DL (ref 0–200)
CO2 SERPL-SCNC: 26.8 MMOL/L (ref 22–29)
CREAT SERPL-MCNC: 1.04 MG/DL (ref 0.76–1.27)
CREAT UR-MCNC: 176.8 MG/DL
GLOBULIN SER CALC-MCNC: 1.8 GM/DL
GLUCOSE SERPL-MCNC: 149 MG/DL (ref 65–99)
HBA1C MFR BLD: 6.2 % (ref 4.8–5.6)
HDLC SERPL-MCNC: 52 MG/DL (ref 40–60)
LDLC SERPL CALC-MCNC: 63 MG/DL (ref 0–100)
MICROALBUMIN UR-MCNC: 4.2 UG/ML
POTASSIUM SERPL-SCNC: 4.5 MMOL/L (ref 3.5–5.2)
PROT SERPL-MCNC: 6.4 G/DL (ref 6–8.5)
SODIUM SERPL-SCNC: 142 MMOL/L (ref 136–145)
TRIGL SERPL-MCNC: 65 MG/DL (ref 0–150)
VLDLC SERPL CALC-MCNC: 14 MG/DL (ref 5–40)

## 2021-09-02 ENCOUNTER — OFFICE VISIT (OUTPATIENT)
Dept: INTERNAL MEDICINE | Facility: CLINIC | Age: 71
End: 2021-09-02

## 2021-09-02 VITALS
HEART RATE: 49 BPM | HEIGHT: 73 IN | SYSTOLIC BLOOD PRESSURE: 120 MMHG | WEIGHT: 188 LBS | DIASTOLIC BLOOD PRESSURE: 64 MMHG | BODY MASS INDEX: 24.92 KG/M2 | RESPIRATION RATE: 16 BRPM | OXYGEN SATURATION: 99 % | TEMPERATURE: 97.4 F

## 2021-09-02 DIAGNOSIS — R35.0 BENIGN PROSTATIC HYPERPLASIA WITH URINARY FREQUENCY: Primary | ICD-10-CM

## 2021-09-02 DIAGNOSIS — N40.1 BENIGN PROSTATIC HYPERPLASIA WITH URINARY FREQUENCY: Primary | ICD-10-CM

## 2021-09-02 DIAGNOSIS — Z23 ENCOUNTER FOR IMMUNIZATION: ICD-10-CM

## 2021-09-02 PROCEDURE — 90746 HEPB VACCINE 3 DOSE ADULT IM: CPT | Performed by: INTERNAL MEDICINE

## 2021-09-02 PROCEDURE — 99213 OFFICE O/P EST LOW 20 MIN: CPT | Performed by: INTERNAL MEDICINE

## 2021-09-02 PROCEDURE — G0010 ADMIN HEPATITIS B VACCINE: HCPCS | Performed by: INTERNAL MEDICINE

## 2021-09-03 NOTE — PROGRESS NOTES
"Chief Complaint  Slow Heart Rate (2 MO F/U) and Immunizations (HEP B VACCINE)    Subjective          Rupesh Fox presents to [facility]  Here to follow up bradycardia, doing well without issues; no dizziness, no lightheadedness, no exercise issues; keeps home log of Bps on his watch, average in 40s to 50s he feels    BPH, did well with finasteride but recently having more night time urinary frequency recently      Objective   Vital Signs:   /64   Pulse (!) 49   Temp 97.4 °F (36.3 °C)   Resp 16   Ht 185.4 cm (73\")   Wt 85.3 kg (188 lb)   SpO2 99%   BMI 24.80 kg/m²     Physical Exam  Vitals and nursing note reviewed.   Constitutional:       General: He is not in acute distress.     Appearance: Normal appearance.   HENT:      Head: Normocephalic and atraumatic.      Right Ear: External ear normal.      Left Ear: External ear normal.      Nose: Nose normal.      Mouth/Throat:      Mouth: Mucous membranes are moist.      Pharynx: Oropharynx is clear.   Eyes:      Extraocular Movements: Extraocular movements intact.      Conjunctiva/sclera: Conjunctivae normal.      Pupils: Pupils are equal, round, and reactive to light.   Cardiovascular:      Rate and Rhythm: Normal rate and regular rhythm.      Pulses: Normal pulses.      Heart sounds: Normal heart sounds. No murmur heard.   No gallop.    Pulmonary:      Effort: Pulmonary effort is normal.      Breath sounds: Normal breath sounds.   Abdominal:      General: Abdomen is flat. Bowel sounds are normal. There is no distension.      Palpations: Abdomen is soft. There is no mass.      Tenderness: There is no abdominal tenderness.   Musculoskeletal:         General: No swelling. Normal range of motion.      Cervical back: Normal range of motion and neck supple.   Skin:     General: Skin is warm and dry.      Findings: No rash.   Neurological:      General: No focal deficit present.      Mental Status: He is alert " and oriented to person, place, and time. Mental status is at baseline.   Psychiatric:         Mood and Affect: Mood normal.         Behavior: Behavior normal.        Result Review :                 Assessment and Plan    Diagnoses and all orders for this visit:    1. Benign prostatic hyperplasia with urinary frequency (Primary)  - continue finasteride 5mg and terazosin 10mg, doing ok but feels he is urinating more at night; counseled on risks and benefits of meds vs. Urology evaluation and surgical intervention    2. Encounter for immunization  -     Hepatitis B Vaccine Adult IM - Engerix  - DM history, not immune to hepB    3. Bradycardia  - counseled on monitoring; remains asymptomatic, consider cardiology evaluation, counseled and discussed today  - rtc 1 month      Follow Up   No follow-ups on file.  Patient was given instructions and counseling regarding his condition or for health maintenance advice. Please see specific information pulled into the AVS if appropriate.

## 2021-10-06 ENCOUNTER — OFFICE VISIT (OUTPATIENT)
Dept: INTERNAL MEDICINE | Facility: CLINIC | Age: 71
End: 2021-10-06

## 2021-10-06 VITALS
WEIGHT: 180 LBS | HEIGHT: 73 IN | RESPIRATION RATE: 16 BRPM | BODY MASS INDEX: 23.86 KG/M2 | DIASTOLIC BLOOD PRESSURE: 78 MMHG | HEART RATE: 44 BPM | SYSTOLIC BLOOD PRESSURE: 120 MMHG | OXYGEN SATURATION: 98 % | TEMPERATURE: 96.5 F

## 2021-10-06 DIAGNOSIS — M25.551 BILATERAL HIP PAIN: ICD-10-CM

## 2021-10-06 DIAGNOSIS — M25.552 BILATERAL HIP PAIN: ICD-10-CM

## 2021-10-06 DIAGNOSIS — M25.562 PAIN IN BOTH KNEES, UNSPECIFIED CHRONICITY: Primary | ICD-10-CM

## 2021-10-06 DIAGNOSIS — R35.0 BENIGN PROSTATIC HYPERPLASIA WITH URINARY FREQUENCY: ICD-10-CM

## 2021-10-06 DIAGNOSIS — E78.2 MIXED HYPERLIPIDEMIA: ICD-10-CM

## 2021-10-06 DIAGNOSIS — N40.1 BENIGN PROSTATIC HYPERPLASIA WITH URINARY FREQUENCY: ICD-10-CM

## 2021-10-06 DIAGNOSIS — M25.561 PAIN IN BOTH KNEES, UNSPECIFIED CHRONICITY: Primary | ICD-10-CM

## 2021-10-06 DIAGNOSIS — Z79.4 TYPE 2 DIABETES MELLITUS WITH HYPOGLYCEMIA WITHOUT COMA, WITH LONG-TERM CURRENT USE OF INSULIN (HCC): ICD-10-CM

## 2021-10-06 DIAGNOSIS — B96.89 SINUSITIS, BACTERIAL: ICD-10-CM

## 2021-10-06 DIAGNOSIS — I10 ESSENTIAL HYPERTENSION: ICD-10-CM

## 2021-10-06 DIAGNOSIS — J32.9 SINUSITIS, BACTERIAL: ICD-10-CM

## 2021-10-06 DIAGNOSIS — E11.649 TYPE 2 DIABETES MELLITUS WITH HYPOGLYCEMIA WITHOUT COMA, WITH LONG-TERM CURRENT USE OF INSULIN (HCC): ICD-10-CM

## 2021-10-06 PROCEDURE — G0008 ADMIN INFLUENZA VIRUS VAC: HCPCS | Performed by: INTERNAL MEDICINE

## 2021-10-06 PROCEDURE — 99214 OFFICE O/P EST MOD 30 MIN: CPT | Performed by: INTERNAL MEDICINE

## 2021-10-06 PROCEDURE — 90662 IIV NO PRSV INCREASED AG IM: CPT | Performed by: INTERNAL MEDICINE

## 2021-10-06 RX ORDER — AMOXICILLIN AND CLAVULANATE POTASSIUM 875; 125 MG/1; MG/1
1 TABLET, FILM COATED ORAL 2 TIMES DAILY
Qty: 14 TABLET | Refills: 0 | Status: SHIPPED | OUTPATIENT
Start: 2021-10-06 | End: 2021-10-13

## 2021-10-06 RX ORDER — LISINOPRIL 5 MG/1
5 TABLET ORAL DAILY
Qty: 90 TABLET | Refills: 2 | Status: SHIPPED | OUTPATIENT
Start: 2021-10-06 | End: 2022-01-06 | Stop reason: SDUPTHER

## 2021-10-06 RX ORDER — TERAZOSIN 10 MG/1
10 CAPSULE ORAL NIGHTLY
Qty: 90 CAPSULE | Refills: 2 | Status: SHIPPED | OUTPATIENT
Start: 2021-10-06 | End: 2022-01-06 | Stop reason: SDUPTHER

## 2021-10-06 RX ORDER — FINASTERIDE 5 MG/1
5 TABLET, FILM COATED ORAL DAILY
Qty: 90 TABLET | Refills: 2 | Status: SHIPPED | OUTPATIENT
Start: 2021-10-06 | End: 2022-01-06 | Stop reason: SDUPTHER

## 2021-10-06 RX ORDER — METFORMIN HYDROCHLORIDE 750 MG/1
750 TABLET, EXTENDED RELEASE ORAL
Qty: 90 TABLET | Refills: 2 | Status: SHIPPED | OUTPATIENT
Start: 2021-10-06 | End: 2022-01-06 | Stop reason: SDUPTHER

## 2021-10-06 RX ORDER — SIMVASTATIN 20 MG
20 TABLET ORAL NIGHTLY
Qty: 90 TABLET | Refills: 2 | Status: SHIPPED | OUTPATIENT
Start: 2021-10-06 | End: 2022-01-06 | Stop reason: SDUPTHER

## 2021-10-06 NOTE — PROGRESS NOTES
"Chief Complaint  Diabetes (FOLLOW UP) and Sinusitis    Subjective          Rupesh Fox presents to [facility]  DM, HLD, HTN, doing well, no chest pain, sob, headaches, vision changes, no lows; keeping home log, normal    Here with 2.5 weeks of nasal pressure, congestion, drainage, no fevers, no sob    Bilateral knee and hip pain, present for years but seems worse the last couple of years; is still able to walk a lot but does need to rest at times after long walks for a day or so      Objective   Vital Signs:   /78 (BP Location: Left arm, Patient Position: Sitting, Cuff Size: Adult)   Pulse (!) 44   Temp 96.5 °F (35.8 °C)   Resp 16   Ht 185.4 cm (73\")   Wt 81.6 kg (180 lb)   SpO2 98%   BMI 23.75 kg/m²     Physical Exam  Vitals and nursing note reviewed.   Constitutional:       General: He is not in acute distress.     Appearance: Normal appearance.   HENT:      Head: Normocephalic and atraumatic.      Right Ear: External ear normal.      Left Ear: External ear normal.      Nose: Nose normal.      Mouth/Throat:      Mouth: Mucous membranes are moist.      Pharynx: Oropharynx is clear.   Eyes:      Extraocular Movements: Extraocular movements intact.      Conjunctiva/sclera: Conjunctivae normal.      Pupils: Pupils are equal, round, and reactive to light.   Cardiovascular:      Rate and Rhythm: Normal rate and regular rhythm.      Pulses: Normal pulses.      Heart sounds: Normal heart sounds. No murmur heard.   No gallop.    Pulmonary:      Effort: Pulmonary effort is normal.      Breath sounds: Normal breath sounds.   Abdominal:      General: Abdomen is flat. Bowel sounds are normal. There is no distension.      Palpations: Abdomen is soft. There is no mass.      Tenderness: There is no abdominal tenderness.   Musculoskeletal:         General: No swelling. Normal range of motion.      Cervical back: Normal range of motion and neck supple.   Skin:     " General: Skin is warm and dry.      Findings: No rash.   Neurological:      General: No focal deficit present.      Mental Status: He is alert and oriented to person, place, and time. Mental status is at baseline.   Psychiatric:         Mood and Affect: Mood normal.         Behavior: Behavior normal.        Result Review :                 Assessment and Plan    Diagnoses and all orders for this visit:    1. Pain in both knees, unspecified chronicity (Primary)  -     Ambulatory Referral to Physical Therapy Evaluate and treat    2. Benign prostatic hyperplasia with urinary frequency  -     terazosin (HYTRIN) 10 MG capsule; Take 1 capsule by mouth Every Night.  Dispense: 90 capsule; Refill: 2  -     finasteride (PROSCAR) 5 MG tablet; Take 1 tablet by mouth Daily.  Dispense: 90 tablet; Refill: 2    3. Type 2 diabetes mellitus with hypoglycemia without coma, with long-term current use of insulin ([facility])  -     Comprehensive Metabolic Panel  -     Hemoglobin A1c  -     Microalbumin / Creatinine Urine Ratio - Urine, Clean Catch  -     simvastatin (ZOCOR) 20 MG tablet; Take 1 tablet by mouth Every Night.  Dispense: 90 tablet; Refill: 2  -     metFORMIN ER (GLUCOPHAGE-XR) 750 MG 24 hr tablet; Take 1 tablet by mouth Daily With Breakfast.  Dispense: 90 tablet; Refill: 2    - feet ok 4/2/21  - lipids on statin, check labs  - eyes yearly recommended  - gaol a1c <7, metofrmin 750 er daily, cousneled on risks and benefits  - check microalbumin, on ace  - get vaccine records for ppsv23, complete HepB series in few months, flu today, will get covid booster #3 when available    4. Mixed hyperlipidemia  -     Lipid Panel With / Chol / HDL Ratio  -     simvastatin (ZOCOR) 20 MG tablet; Take 1 tablet by mouth Every Night.  Dispense: 90 tablet; Refill: 2    5. Essential hypertension  -     lisinopril (PRINIVIL,ZESTRIL) 5 MG tablet; Take 1 tablet by mouth Daily.  Dispense: 90 tablet; Refill: 2    6. Bilateral hip pain  -     Ambulatory  Referral to Physical Therapy Evaluate and treat    7. Sinusitis, bacterial  -     amoxicillin-clavulanate (Augmentin) 875-125 MG per tablet; Take 1 tablet by mouth 2 (Two) Times a Day for 7 days.  Dispense: 14 tablet; Refill: 0        Follow Up   Return in about 3 months (around 1/6/2022) for Recheck.  Patient was given instructions and counseling regarding his condition or for health maintenance advice. Please see specific information pulled into the AVS if appropriate.

## 2021-10-07 LAB
ALBUMIN SERPL-MCNC: 4.9 G/DL (ref 3.5–5.2)
ALBUMIN/CREAT UR: <9 MG/G CREAT (ref 0–29)
ALBUMIN/GLOB SERPL: 2.5 G/DL
ALP SERPL-CCNC: 66 U/L (ref 39–117)
ALT SERPL-CCNC: 18 U/L (ref 1–41)
AST SERPL-CCNC: 23 U/L (ref 1–40)
BILIRUB SERPL-MCNC: 0.8 MG/DL (ref 0–1.2)
BUN SERPL-MCNC: 21 MG/DL (ref 8–23)
BUN/CREAT SERPL: 20 (ref 7–25)
CALCIUM SERPL-MCNC: 10 MG/DL (ref 8.6–10.5)
CHLORIDE SERPL-SCNC: 104 MMOL/L (ref 98–107)
CHOLEST SERPL-MCNC: 133 MG/DL (ref 0–200)
CHOLEST/HDLC SERPL: 2.15 {RATIO}
CO2 SERPL-SCNC: 26.6 MMOL/L (ref 22–29)
CREAT SERPL-MCNC: 1.05 MG/DL (ref 0.76–1.27)
CREAT UR-MCNC: 34.1 MG/DL
GLOBULIN SER CALC-MCNC: 2 GM/DL
GLUCOSE SERPL-MCNC: 134 MG/DL (ref 65–99)
HBA1C MFR BLD: 6.3 % (ref 4.8–5.6)
HDLC SERPL-MCNC: 62 MG/DL (ref 40–60)
LDLC SERPL CALC-MCNC: 61 MG/DL (ref 0–100)
MICROALBUMIN UR-MCNC: <3 UG/ML
POTASSIUM SERPL-SCNC: 4.9 MMOL/L (ref 3.5–5.2)
PROT SERPL-MCNC: 6.9 G/DL (ref 6–8.5)
SODIUM SERPL-SCNC: 139 MMOL/L (ref 136–145)
TRIGL SERPL-MCNC: 43 MG/DL (ref 0–150)
VLDLC SERPL CALC-MCNC: 10 MG/DL (ref 5–40)

## 2021-10-13 NOTE — PROGRESS NOTES
Physical Therapy Initial Evaluation and Plan of Care      Patient: Rupesh Fox   : 1950  Diagnosis/ICD-10 Code:  Chronic hip pain, bilateral [M25.551, M25.552, G89.29]  Referring practitioner: Moreno Martinez MD  Date of Initial Visit: 10/14/2021  Today's Date: 10/14/2021  Patient seen for 1 sessions           Subjective Questionnaire: LEFS: 42/80      Subjective Evaluation    History of Present Illness  Mechanism of injury: Pt presents to physical therapy with c/o B hip and B knee pain. Pt reports has been present for years but seems worse the last couple of years; is still able to walk a lot but does need to rest at times after long walks for a day or so. Pt reports he stopped jogging last year due to BLE pain; has continued walking 2.5-3 miles for 3 times per week. L knee more painful than R; hips secondary to knees. 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    PMH: DMII, HTN, hip sprain/glut tendinitis, trochanteric bursitis, tinnitus with hearing loss, dysphagia, prostate/urinary issues, bradycardia    Quality of life: excellent    Pain  Current pain ratin (sitting)  At best pain ratin  At worst pain ratin (L knee worst)  Quality: discomfort, dull ache and tight  Relieving factors: change in position  Aggravating factors: lifting, movement, stairs, standing, ambulation, prolonged positioning and repetitive movement  Progression: worsening    Hand dominance: right    Diagnostic Tests  No diagnostic tests performed    Treatments  No previous or current treatments  Patient Goals  Patient goals for therapy: decreased pain, improved balance, increased motion, increased strength, independence with ADLs/IADLs, return to sport/leisure activities and return to work         Objective          Observations   Left Knee   Negative for effusion.     Right Knee   Negative for effusion.       Palpation   Left   No palpable tenderness to the lateral gastrocnemius, medial gastrocnemius,  rectus femoris, vastus lateralis and vastus medialis.   Hypertonic in the distal biceps femoris, distal semimembranosus and distal semitendinosus.     Right   No palpable tenderness to the lateral gastrocnemius, medial gastrocnemius, rectus femoris, vastus lateralis and vastus medialis.   Hypertonic in the distal biceps femoris, distal semimembranosus and distal semitendinosus.     Tenderness   Left Knee   Tenderness in the lateral joint line, medial joint line and patellar tendon. No tenderness in the LCL (distal), LCL (proximal), MCL (distal) and MCL (proximal).     Right Knee   Tenderness in the lateral joint line, medial joint line and patellar tendon. No tenderness in the LCL (distal), LCL (proximal), MCL (distal) and MCL (proximal).     Neurological Testing     Sensation     Knee   Left Knee   Intact: light touch    Right Knee   Intact: light touch     Additional Neurological Details  Pt denies numbness/tingling down BLE    Active Range of Motion   Left Knee   Flexion: 130 degrees with pain  Extensor la degrees with pain    Right Knee   Flexion: 140 degrees   Extensor la degrees     Passive Range of Motion   Left Hip   Flexion: WFL  Extension: WFL  Abduction: WFL  Adduction: WFL  External rotation (90/90): 21 degrees   Internal rotation (90/90): 12 degrees     Right Hip   Flexion: WFL  Extension: WFL  Abduction: WFL  Adduction: WFL  External rotation (90/90): 32 degrees   Internal rotation (90/90): 12 degrees     Strength/Myotome Testing     Left Hip   Planes of Motion   Flexion: 4  Abduction: 3+  Adduction: 3+  External rotation: 3-  Internal rotation: 3-    Right Hip   Planes of Motion   Flexion: 4  Abduction: 4-  Adduction: 4-  External rotation: 3+  Internal rotation: 3+    Left Knee   Flexion: 4  Extension: 4-  Quadriceps contraction: good    Right Knee   Flexion: 4+  Extension: 4+  Quadriceps contraction: good    Tests     Left Knee   Positive medial Maurice, pivot shift, valgus stress test at  30 degrees and varus stress test at 30 degrees.   Negative anterior drawer, posterior drawer, valgus stress test at 0 degrees and varus stress test at 0 degrees.     Right Knee   Negative anterior drawer and posterior drawer.     Swelling     Left Knee Girth Measurement (cm)   Joint line: 40 cm    Right Knee Girth Measurement (cm)   Joint line: 40 cm          Assessment & Plan     Assessment  Impairments: abnormal gait, abnormal muscle firing, abnormal or restricted ROM, activity intolerance, impaired balance, impaired physical strength, lacks appropriate home exercise program, pain with function and weight-bearing intolerance  Assessment details: Rupesh Fox is a 71 year-old male referred to physical therapy for B hip and B knee pain. He presents with a stable clinical presentation.  He has no comorbidities  or personal factors that may affect his progress in the plan of care other than BLE pain being a chronic issue.  Signs and symptoms are consistent with physical therapy diagnosis of impaired hip and knee ROM, strength and muscle endurance along with limited flexibility. Patient is appropriate for skilled physical therapy in order to reduce pain and increase ease with daily mobility. Patient presents with difficulty with upright activity such as walking and standing along with inability to maintain prolonged positioning is sitting. During evaluation, pt educated on anatomy, goal of interventions, use of ice, trial of knee brace, use of OTC topical pain relief, timeline of care and body mechanics to promote healthy lifestyle and improve quality of life.  Prognosis: good  Functional Limitations: carrying objects, sleeping, walking, uncomfortable because of pain, sitting, standing, stooping and unable to perform repetitive tasks  Goals  Plan Goals: STG: ~6 weeks  1. Pt will be demonstrate L knee 0-135 degrees of knee extension and flexion  2. Pt will improve LEFS score to 50/80  3. Pt will be able to walk for 30  minutes with knee pain <4/10  4. Pt will demonstrate a SLR without extensor lag  5. Pt will improve B hip IR>20 degrees    LTG: ~12 weeks  1. Pt will be demonstrate L knee AROM 0-140 degrees of knee extension and flexion  2. Pt will improve LEFS score to 55/80  3. Pt will be able to walk for 45 minutes with knee pain <2/10  4. Pt will improve knee extensor and knee flexor strength to 5/5  5. Pt will be I with HEP      Plan  Therapy options: will be seen for skilled physical therapy services  Planned modality interventions: cryotherapy, electrical stimulation/Russian stimulation, thermotherapy (hydrocollator packs), ultrasound, dry needling and traction  Planned therapy interventions: ADL retraining, balance/weight-bearing training, body mechanics training, flexibility, functional ROM exercises, gait training, home exercise program, joint mobilization, manual therapy, neuromuscular re-education, postural training, soft tissue mobilization, spinal/joint mobilization, strengthening, stretching and therapeutic activities  Treatment plan discussed with: patient  Plan details: 1 time per week for 12 weeks; 12 visits        Manual Therapy:    -     mins  57225;  Therapeutic Exercise:    26     mins  13953;     Neuromuscular [place]:    -    mins  60622;    Therapeutic Activity:     -     mins  09455;     Gait Training:      -     mins  91114;     Ultrasound:     -     mins  09666;    Electrical Stimulation:    -     mins  12444 ( );  Dry Needling     -     mins self-pay    Timed Treatment:   26   mins   Total Treatment:     58   mins    PT SIGNATURE: Haylie Pradhan PT   DATE TREATMENT INITIATED: 10/14/2021    Initial Certification  Certification Period: 1/12/2022  I certify that the therapy services are furnished while this patient is under my care.  The services outlined above are required by this patient, and will be reviewed every 90 days.     PHYSICIAN: Moreno Martinez MD      DATE:     Please sign and return  via fax to [phone]. Thank you, [facility].

## 2021-10-14 ENCOUNTER — TREATMENT (OUTPATIENT)
Dept: PHYSICAL THERAPY | Facility: CLINIC | Age: 71
End: 2021-10-14

## 2021-10-14 DIAGNOSIS — M25.562 CHRONIC PAIN OF BOTH KNEES: ICD-10-CM

## 2021-10-14 DIAGNOSIS — M25.551 CHRONIC HIP PAIN, BILATERAL: Primary | ICD-10-CM

## 2021-10-14 DIAGNOSIS — M25.561 CHRONIC PAIN OF BOTH KNEES: ICD-10-CM

## 2021-10-14 DIAGNOSIS — G89.29 CHRONIC HIP PAIN, BILATERAL: Primary | ICD-10-CM

## 2021-10-14 DIAGNOSIS — G89.29 CHRONIC PAIN OF BOTH KNEES: ICD-10-CM

## 2021-10-14 DIAGNOSIS — Z74.09 IMPAIRED MOBILITY AND ENDURANCE: ICD-10-CM

## 2021-10-14 DIAGNOSIS — M25.552 CHRONIC HIP PAIN, BILATERAL: Primary | ICD-10-CM

## 2021-10-14 PROCEDURE — 97110 THERAPEUTIC EXERCISES: CPT | Performed by: PHYSICAL THERAPIST

## 2021-10-14 PROCEDURE — 97162 PT EVAL MOD COMPLEX 30 MIN: CPT | Performed by: PHYSICAL THERAPIST

## 2021-10-14 NOTE — PATIENT INSTRUCTIONS
Access Code: [email or web address]  URL: [email or web address]  Date: 10/14/2021  Prepared by: Haylie Jarrett    Exercises  Bilateral Short Arc Quad Set - 1 x daily - 7 x weekly - 10 reps  Supine Bridge - 1 x daily - 7 x weekly - 10 reps  Active Straight Leg Raise with Quad Set - 1 x daily - 7 x weekly - 10 reps  Straight Leg Raise with External Rotation - 1 x daily - 7 x weekly - 10 reps  Sidelying Hip Abduction - 1 x daily - 7 x weekly - 10 reps  Seated Hamstring Stretch - 1 x daily - 7 x weekly - 3 sets - 10 hold  Full Arc Quad - 1 x daily - 7 x weekly - 10 reps  Heel Toe Raises with Counter Support - 1 x daily - 7 x weekly - 10 reps

## 2021-10-21 ENCOUNTER — TREATMENT (OUTPATIENT)
Dept: PHYSICAL THERAPY | Facility: CLINIC | Age: 71
End: 2021-10-21

## 2021-10-21 DIAGNOSIS — M25.562 CHRONIC PAIN OF BOTH KNEES: ICD-10-CM

## 2021-10-21 DIAGNOSIS — M25.552 CHRONIC HIP PAIN, BILATERAL: Primary | ICD-10-CM

## 2021-10-21 DIAGNOSIS — Z74.09 IMPAIRED MOBILITY AND ENDURANCE: ICD-10-CM

## 2021-10-21 DIAGNOSIS — M25.551 CHRONIC HIP PAIN, BILATERAL: Primary | ICD-10-CM

## 2021-10-21 DIAGNOSIS — M25.561 CHRONIC PAIN OF BOTH KNEES: ICD-10-CM

## 2021-10-21 DIAGNOSIS — G89.29 CHRONIC HIP PAIN, BILATERAL: Primary | ICD-10-CM

## 2021-10-21 DIAGNOSIS — G89.29 CHRONIC PAIN OF BOTH KNEES: ICD-10-CM

## 2021-10-21 PROCEDURE — 97110 THERAPEUTIC EXERCISES: CPT | Performed by: PHYSICAL THERAPIST

## 2021-10-21 NOTE — PATIENT INSTRUCTIONS
Access Code: [email or web address]  URL: [email or web address]  Date: 10/21/2021  Prepared by: Elli Gaxiola    Exercises  Bilateral Short Arc Quad Set - 1 x daily - 7 x weekly - 15 reps  Supine Bridge - 1 x daily - 7 x weekly - 10 reps  Active Straight Leg Raise with Quad Set - 1 x daily - 7 x weekly - 15 reps  Straight Leg Raise with External Rotation - 1 x daily - 7 x weekly - 15 reps  Sidelying Hip Abduction - 1 x daily - 7 x weekly - 15 reps  Seated Hamstring Stretch - 1 x daily - 7 x weekly - 3 sets - 10 hold  Seated Long Arc Quad - 1 x daily - 7 x weekly - 15 reps  Heel Toe Raises with Counter Support - 1 x daily - 7 x weekly - 15 reps  Sidelying Hip Adduction - 1 x daily - 7 x weekly - 1 sets - 15 reps  Clamshell - 1 x daily - 7 x weekly - 1 sets - 15 reps  Standing Hip Abduction - 1 x daily - 7 x weekly - 1 sets - 10 reps  Standing Hip Extension - 1 x daily - 7 x weekly - 1 sets - 10 reps  Standing Hip Flexion AROM - 1 x daily - 7 x weekly - 1 sets - 10 reps  Standing Terminal Knee Extension with Resistance - 1 x daily - 7 x weekly - 1 sets - 15 reps - 5 hold

## 2021-10-21 NOTE — PROGRESS NOTES
Physical Therapy Daily Progress Note        Patient: Rupesh Fox   : 1950  Diagnosis/ICD-10 Code:  Chronic hip pain, bilateral [M25.551, M25.552, G89.29]  Referring practitioner: Moreno Martinez MD  Date of Initial Visit: Type: THERAPY  Noted: 10/14/2021  Today's Date: 10/21/2021  Patient seen for 2 sessions         Rupesh Fox reports: his knee pain is about the same.  He got a knee brace that he wears on his morning walk and that seems to help during and after his walk.  He has been doing his exercises everyday.         Subjective     Objective   See Exercise, Manual, and Modality Logs for complete treatment.       Assessment/Plan  Progressed strengthening this date and pt reported no increased pain or symptoms with exercises.  Cues provided for technique and pt able to return demonstrate improvements.  Updated HEP to reflect changes.  No questions or concerns.  Will continue to monitor tolerance to exercises and progress as tolerated towards decreased pain with functional activities.    Progress per Plan of Care           Manual Therapy:         mins  32206;  Therapeutic Exercise:    48     mins  28141;     Neuromuscular [place]:        mins  52960;    Therapeutic Activity:          mins  37734;     Gait Training:           mins  05373;     Ultrasound:          mins  77937;    Electrical Stimulation:         mins  43982 ( );  Dry Needling          mins self-pay    Timed Treatment:   48   mins   Total Treatment:     48   mins    Elli Gaxiola PTA  Physical Therapist Assistant

## 2021-10-27 ENCOUNTER — TELEPHONE (OUTPATIENT)
Dept: INTERNAL MEDICINE | Facility: CLINIC | Age: 71
End: 2021-10-27

## 2021-10-27 NOTE — TELEPHONE ENCOUNTER
Hub to read:  Called patient to let him know that we do not have any available appointments this week, but  would like him to go to the [facility] and be evaluated.

## 2021-10-28 ENCOUNTER — TREATMENT (OUTPATIENT)
Dept: PHYSICAL THERAPY | Facility: CLINIC | Age: 71
End: 2021-10-28

## 2021-10-28 DIAGNOSIS — M25.561 CHRONIC PAIN OF BOTH KNEES: ICD-10-CM

## 2021-10-28 DIAGNOSIS — M25.562 CHRONIC PAIN OF BOTH KNEES: ICD-10-CM

## 2021-10-28 DIAGNOSIS — G89.29 CHRONIC PAIN OF BOTH KNEES: ICD-10-CM

## 2021-10-28 DIAGNOSIS — Z74.09 IMPAIRED MOBILITY AND ENDURANCE: ICD-10-CM

## 2021-10-28 DIAGNOSIS — M25.552 CHRONIC HIP PAIN, BILATERAL: Primary | ICD-10-CM

## 2021-10-28 DIAGNOSIS — M25.551 CHRONIC HIP PAIN, BILATERAL: Primary | ICD-10-CM

## 2021-10-28 DIAGNOSIS — G89.29 CHRONIC HIP PAIN, BILATERAL: Primary | ICD-10-CM

## 2021-10-28 PROCEDURE — 97110 THERAPEUTIC EXERCISES: CPT | Performed by: PHYSICAL THERAPIST

## 2021-10-28 NOTE — PROGRESS NOTES
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 No pain in hips. No pain in R knee; but continued L knee pain that flared up on Tuesday. Pt considering getting a brace for his R knee too. 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       Objective   See Exercise, Manual, and Modality Logs for complete treatment.     L knee AROM: 0-136 degrees    R knee AROM: 0-136 degrees      Assessment/Plan  Added BLE leg press today to improve hip/knee strengthening; mild L knee pain with full ext. Pt instructed to keep knees \"soft\" avoiding aggressive knee ext. Pt able to increase reps of open chain exercises this date without increased pain.        Progress per Plan of Care and Progress strengthening /stabilization /functional activity       Timed:  Manual Therapy:    -     mins  14800;  Therapeutic Exercise:    43     mins  59208;     Neuromuscular [place]:    -    mins  81268;    Therapeutic Activity:     -     mins  22466;     Gait Training:      -     mins  20621;     Ultrasound:     -     mins  11484;    Electrical Stimulation:    -     mins  54828 ( );  Dry needling:      -     mins  54388/78509    Untimed:  Electrical Stimulation:    -     mins  11162 ( );  Mechanical Traction:    -     mins  37528;     Timed Treatment:   43   mins   Total Treatment:     45   mins  Haylie Pradhan PT  Physical Therapist                  "

## 2021-11-04 ENCOUNTER — TREATMENT (OUTPATIENT)
Dept: PHYSICAL THERAPY | Facility: CLINIC | Age: 71
End: 2021-11-04

## 2021-11-04 DIAGNOSIS — G89.29 CHRONIC PAIN OF BOTH KNEES: ICD-10-CM

## 2021-11-04 DIAGNOSIS — M25.562 CHRONIC PAIN OF BOTH KNEES: ICD-10-CM

## 2021-11-04 DIAGNOSIS — Z74.09 IMPAIRED MOBILITY AND ENDURANCE: ICD-10-CM

## 2021-11-04 DIAGNOSIS — M25.552 CHRONIC HIP PAIN, BILATERAL: Primary | ICD-10-CM

## 2021-11-04 DIAGNOSIS — G89.29 CHRONIC HIP PAIN, BILATERAL: Primary | ICD-10-CM

## 2021-11-04 DIAGNOSIS — M25.551 CHRONIC HIP PAIN, BILATERAL: Primary | ICD-10-CM

## 2021-11-04 DIAGNOSIS — M25.561 CHRONIC PAIN OF BOTH KNEES: ICD-10-CM

## 2021-11-04 PROCEDURE — 97530 THERAPEUTIC ACTIVITIES: CPT | Performed by: PHYSICAL THERAPIST

## 2021-11-04 PROCEDURE — 97110 THERAPEUTIC EXERCISES: CPT | Performed by: PHYSICAL THERAPIST

## 2021-11-04 NOTE — PROGRESS NOTES
Physical Therapy Daily Progress Note        Patient: Rupesh Fox   : 1950  Diagnosis/ICD-10 Code:  Chronic hip pain, bilateral [M25.551, M25.552, G89.29]  Referring practitioner: Moreno Martinez MD  Date of Initial Visit: Type: THERAPY  Noted: 10/14/2021  Today's Date: 2021  Patient seen for 4 sessions         Rupesh Fox reports: no pain in the hips but still has some in his knees especially if he doesn't wear his knee braces.  He chopped down some dead trees and forgot his braces the first day and his knees were sore but the next day he did the same things but had his knee braces and they didn't hurt as bad.  He said this knees are stronger so he has less pain and it is easier than it has been in a long time.        Subjective     Objective   See Exercise, Manual, and Modality Logs for complete treatment.       Assessment/Plan  Able to progress strengthening this date however some knee discomfort with standing 3 way hip and step ups.  Will monitor tolerance to closed chain exercises and progress slowly/within tolerance as well as continued progression of open chain strengthening to progress strength with decreased stress or aggravation to knees.  Updated HEP to reflect changes made this date however instructed pt to complete step ups if they do not increase pain.    Progress per Plan of Care           Manual Therapy:         mins  70690;  Therapeutic Exercise:    36     mins  30810;     Neuromuscular [place]:        mins  29663;    Therapeutic Activity:     10     mins  28915;     Gait Training:           mins  86054;     Ultrasound:          mins  01997;    Electrical Stimulation:         mins  39250 ( );  Dry Needling          mins self-pay    Timed Treatment:   46   mins   Total Treatment:     54   mins    Elli Gaxiola PTA  Physical Therapist Assistant

## 2021-11-04 NOTE — PATIENT INSTRUCTIONS
Access Code: [email or web address]  URL: [email or web address]  Date: 11/04/2021  Prepared by: Elli Gaxiola    Exercises    Step Up - 1 x daily - 7 x weekly - 1 sets - 10 reps  Lateral Step Up - 1 x daily - 7 x weekly - 1 sets - 10 reps

## 2021-11-10 NOTE — PROGRESS NOTES
Re-Assessment / Re-Certification for 30 Day Progress Note      Patient: Rupesh Fox   : 1950  Diagnosis/ICD-10 Code:  Chronic hip pain, bilateral [M25.551, M25.552, G89.29]  Referring practitioner: Moreno Martinez MD  Date of Initial Visit: Type: THERAPY  Noted: 10/14/2021  Today's Date: 2021  Patient seen for 5 sessions      Subjective:   Rupesh Fox reports: no hip pain, L knee pain /10 and R knee pain 1-2/10 and not very frequent. 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 Pt reports he took a break from walking for the past 2 weeks to decrease wear on his knees.  Subjective Questionnaire: LEFS: 56/80 improved from 42/80  Clinical Progress: improved  Home Program Compliance: Yes  Treatment has included: therapeutic exercise, neuromuscular re-education, manual therapy, therapeutic activity and gait training    Objective     Active Range of Motion   Left Knee   Flexion: 141 degrees with pain  Extensor la degrees with pain     Right Knee   Flexion: 146 degrees   Extensor la degrees      Passive Range of Motion   Left Hip   Flexion: WFL  Extension: WFL  Abduction: WFL  Adduction: WFL  External rotation (90/90): 51 degrees   Internal rotation (90/90): 30 degrees      Right Hip   Flexion: WFL  Extension: WFL  Abduction: WFL  Adduction: WFL  External rotation (90/90): 42 degrees   Internal rotation (90/90): 37 degrees      Strength/Myotome Testing      Left Hip   Planes of Motion   Flexion: 4+  Abduction: 4  Adduction: 4  External rotation: 3+  Internal rotation: 3+     Right Hip   Planes of Motion   Flexion: 4+  Abduction: 4  Adduction: 4  External rotation: 3+  Internal rotation: 3+     Left Knee   Flexion: 4+  Extension: 4+  Quadriceps contraction: good     Right Knee   Flexion: 4+  Extension: 4+  Quadriceps contraction: good      Assessment & Plan     Assessment  Impairments: abnormal gait, abnormal muscle firing, abnormal or restricted ROM,  activity intolerance, impaired balance, impaired physical strength, lacks appropriate home exercise program, pain with function and weight-bearing intolerance  Assessment details: Rupesh Fox is a 71 year-old male referred to physical therapy for B hip and B knee pain. Pt with minimal to no hip pain since evaluation and controlled knee pain with use of knee brace for support with walking/working/prolonged upright activity and topical pain relievers PRN. Pt is a diabetic and walks for exercise and controlling his sugar; educated to try biking or elliptical to decrease stress in lower body joints. Patient is compliant with HEP and demonstrates improving hip rotation and knee ROM along with strength. Pt making good progress towards goal. During therapy, pt educated on anatomy, goal of interventions, use of ice, knee brace, use of OTC topical pain relief, timeline of care and body mechanics to promote healthy lifestyle and improve quality of life.  Prognosis: good  Functional Limitations: carrying objects, sleeping, walking, uncomfortable because of pain, sitting, standing, stooping and unable to perform repetitive tasks  Goals  Plan Goals: STG: ~6 weeks  1. Pt will be demonstrate L knee 0-135 degrees of knee extension and flexion-MET  2. Pt will improve LEFS score to 50/80-MET  3. Pt will be able to walk for 30 minutes with knee pain <4/10-MET  4. Pt will demonstrate a SLR without extensor lag-MET  5. Pt will improve B hip IR>20 degrees-MET    LTG: ~12 weeks  1. Pt will be demonstrate L knee AROM 0-140 degrees of knee extension and flexion-MET  2. Pt will improve LEFS score to 55/80-MET  3. Pt will be able to walk for 45 minutes with knee pain <2/10-PROGRESSING  4. Pt will improve knee extensor and knee flexor strength to 5/5-PROGRESSING  5. Pt will be I with HEP-PROGRESSING      Plan  Therapy options: will be seen for skilled physical therapy services  Planned modality interventions: cryotherapy, electrical  stimulation/Russian stimulation, thermotherapy (hydrocollator packs), ultrasound, dry needling and traction  Planned therapy interventions: ADL retraining, balance/weight-bearing training, body mechanics training, flexibility, functional ROM exercises, gait training, home exercise program, joint mobilization, manual therapy, neuromuscular re-education, postural training, soft tissue mobilization, spinal/joint mobilization, strengthening, stretching and therapeutic activities  Treatment plan discussed with: patient  Plan details: 1 time per week for 8 weeks; 8 visits        Visit Diagnoses:    ICD-10-CM ICD-9-CM   1. Chronic hip pain, bilateral  M25.551 719.45    M25.552 338.29    G89.29    2. Chronic pain of both knees  M25.561 719.46    M25.562 338.29    G89.29    3. Impaired mobility and endurance  Z74.09 V49.89       PT Signature: Haylie Pradhan PT      Timed:  Manual Therapy:    -     mins  91117;  Therapeutic Exercise:    43     mins  06322;     Neuromuscular [place]:    -    mins  76070;    Therapeutic Activity:     11     mins  59601;     Gait Training:      -     mins  24872;     Ultrasound:     -     mins  51371;    Electrical Stimulation:    -     mins  02054 ( );    Untimed:  Electrical Stimulation:    -     mins  42194 ( );  Mechanical Traction:    -     mins  95617;     Timed Treatment:   54   mins   Total Treatment:     58   mins

## 2021-11-11 ENCOUNTER — TREATMENT (OUTPATIENT)
Dept: PHYSICAL THERAPY | Facility: CLINIC | Age: 71
End: 2021-11-11

## 2021-11-11 DIAGNOSIS — M25.562 CHRONIC PAIN OF BOTH KNEES: ICD-10-CM

## 2021-11-11 DIAGNOSIS — G89.29 CHRONIC PAIN OF BOTH KNEES: ICD-10-CM

## 2021-11-11 DIAGNOSIS — G89.29 CHRONIC HIP PAIN, BILATERAL: Primary | ICD-10-CM

## 2021-11-11 DIAGNOSIS — M25.561 CHRONIC PAIN OF BOTH KNEES: ICD-10-CM

## 2021-11-11 DIAGNOSIS — M25.551 CHRONIC HIP PAIN, BILATERAL: Primary | ICD-10-CM

## 2021-11-11 DIAGNOSIS — M25.552 CHRONIC HIP PAIN, BILATERAL: Primary | ICD-10-CM

## 2021-11-11 DIAGNOSIS — Z74.09 IMPAIRED MOBILITY AND ENDURANCE: ICD-10-CM

## 2021-11-11 PROCEDURE — 97110 THERAPEUTIC EXERCISES: CPT | Performed by: PHYSICAL THERAPIST

## 2021-11-11 PROCEDURE — 97530 THERAPEUTIC ACTIVITIES: CPT | Performed by: PHYSICAL THERAPIST

## 2021-11-18 ENCOUNTER — TREATMENT (OUTPATIENT)
Dept: PHYSICAL THERAPY | Facility: CLINIC | Age: 71
End: 2021-11-18

## 2021-11-18 DIAGNOSIS — M25.552 CHRONIC HIP PAIN, BILATERAL: Primary | ICD-10-CM

## 2021-11-18 DIAGNOSIS — Z74.09 IMPAIRED MOBILITY AND ENDURANCE: ICD-10-CM

## 2021-11-18 DIAGNOSIS — M25.551 CHRONIC HIP PAIN, BILATERAL: Primary | ICD-10-CM

## 2021-11-18 DIAGNOSIS — G89.29 CHRONIC PAIN OF BOTH KNEES: ICD-10-CM

## 2021-11-18 DIAGNOSIS — G89.29 CHRONIC HIP PAIN, BILATERAL: Primary | ICD-10-CM

## 2021-11-18 DIAGNOSIS — M25.562 CHRONIC PAIN OF BOTH KNEES: ICD-10-CM

## 2021-11-18 DIAGNOSIS — M25.561 CHRONIC PAIN OF BOTH KNEES: ICD-10-CM

## 2021-11-18 PROCEDURE — 97530 THERAPEUTIC ACTIVITIES: CPT | Performed by: PHYSICAL THERAPIST

## 2021-11-18 PROCEDURE — 97110 THERAPEUTIC EXERCISES: CPT | Performed by: PHYSICAL THERAPIST

## 2021-11-18 NOTE — PROGRESS NOTES
Physical Therapy Daily Progress Note        Patient: Rupesh Fox   : 1950  Diagnosis/ICD-10 Code:  Chronic hip pain, bilateral [M25.551, M25.552, G89.29]  Referring practitioner: Moreno Martinez MD  Date of Initial Visit: Type: THERAPY  Noted: 10/14/2021  Today's Date: 2021  Patient seen for 6 sessions         Rupesh Fox reports: his knees are doing pretty good.  His (R) one is better than his (L).  His (L) will hurt depending on how much pushes it.  His hips haven't hurt in several weeks.         Subjective     Objective   See Exercise, Manual, and Modality Logs for complete treatment.       Assessment/Plan  Progressed strengthening this date as well as added balance exercises to improve functional mobility and decrease pain with ADLs.  He reported no increased pain or symptoms with progressions thus issued blue band for HEP.  Will continue to progress exercises as tolerated and will d/c to HEP for self management as appropriate.      Progress per Plan of Care           Manual Therapy:         mins  82472;  Therapeutic Exercise:    30     mins  91595;     Neuromuscular [place]:        mins  23688;    Therapeutic Activity:     15     mins  92253;     Gait Training:           mins  53815;     Ultrasound:          mins  06398;    Electrical Stimulation:         mins  12573 ( );  Dry Needling          mins self-pay    Timed Treatment:   45   mins   Total Treatment:     57   mins    Elli Gaxiola PTA  Physical Therapist Assistant

## 2021-11-23 ENCOUNTER — TREATMENT (OUTPATIENT)
Dept: PHYSICAL THERAPY | Facility: CLINIC | Age: 71
End: 2021-11-23

## 2021-11-23 DIAGNOSIS — G89.29 CHRONIC HIP PAIN, BILATERAL: Primary | ICD-10-CM

## 2021-11-23 DIAGNOSIS — G89.29 CHRONIC PAIN OF BOTH KNEES: ICD-10-CM

## 2021-11-23 DIAGNOSIS — M25.562 CHRONIC PAIN OF BOTH KNEES: ICD-10-CM

## 2021-11-23 DIAGNOSIS — M25.551 CHRONIC HIP PAIN, BILATERAL: Primary | ICD-10-CM

## 2021-11-23 DIAGNOSIS — Z74.09 IMPAIRED MOBILITY AND ENDURANCE: ICD-10-CM

## 2021-11-23 DIAGNOSIS — M25.552 CHRONIC HIP PAIN, BILATERAL: Primary | ICD-10-CM

## 2021-11-23 DIAGNOSIS — M25.561 CHRONIC PAIN OF BOTH KNEES: ICD-10-CM

## 2021-11-23 PROCEDURE — 97110 THERAPEUTIC EXERCISES: CPT | Performed by: PHYSICAL THERAPIST

## 2021-11-23 PROCEDURE — 97035 APP MDLTY 1+ULTRASOUND EA 15: CPT | Performed by: PHYSICAL THERAPIST

## 2021-11-23 PROCEDURE — 97112 NEUROMUSCULAR REEDUCATION: CPT | Performed by: PHYSICAL THERAPIST

## 2021-11-23 NOTE — PROGRESS NOTES
"   Physical Therapy Daily Progress Note      Patient: Rupesh Fox   : 1950  Referring practitioner: Moreno Martinez MD  Date of Initial Visit: Type: THERAPY  Noted: 10/14/2021  Today's Date: 2021  Patient seen for 7 sessions         Rupesh Fox reports: \"L knee still bothering me but my R knee and hips feel good. I am really appreciative of this regimen you have me on.\"         Objective   See Exercise, Manual, and Modality Logs for complete treatment.       Assessment/Plan  Added retro step ups, uniclock and increased resistance bands with TKE and HS curls this date also increased weight with leg press today; no increased pain with addition of exercises. Added HS stretch into ADD today to improve hip flexibility. Noted difficulty with stability and balance; plan to continue to progress strength and joint stability of LE to decrease pain and improve function. Added US to L knee joint today with goal to decrease pain. Plan to transition to [facility] and discharge next session.       Anticipate DC next Visit       Timed:  Manual Therapy:    -     mins  75204;  Therapeutic Exercise:    35     mins  38597;     Neuromuscular [place]:    9    mins  33766;    Therapeutic Activity:     -     mins  31998;     Gait Training:      -     mins  06015;     Ultrasound:     8     mins  25388;      Untimed:  Electrical Stimulation:    -     mins  72964 ( );  Mechanical Traction:    -     mins  49965;   Dry needling:      -     mins  24513/    Timed Treatment:   52   mins   Total Treatment:     55   mins  Haylie Pradhan PT  Physical Therapist    License #: [id number]                "

## 2021-12-02 ENCOUNTER — TREATMENT (OUTPATIENT)
Dept: PHYSICAL THERAPY | Facility: CLINIC | Age: 71
End: 2021-12-02

## 2021-12-02 DIAGNOSIS — M25.552 CHRONIC HIP PAIN, BILATERAL: Primary | ICD-10-CM

## 2021-12-02 DIAGNOSIS — M25.551 CHRONIC HIP PAIN, BILATERAL: Primary | ICD-10-CM

## 2021-12-02 DIAGNOSIS — M25.562 CHRONIC PAIN OF BOTH KNEES: ICD-10-CM

## 2021-12-02 DIAGNOSIS — G89.29 CHRONIC HIP PAIN, BILATERAL: Primary | ICD-10-CM

## 2021-12-02 DIAGNOSIS — Z74.09 IMPAIRED MOBILITY AND ENDURANCE: ICD-10-CM

## 2021-12-02 DIAGNOSIS — G89.29 CHRONIC PAIN OF BOTH KNEES: ICD-10-CM

## 2021-12-02 DIAGNOSIS — M25.561 CHRONIC PAIN OF BOTH KNEES: ICD-10-CM

## 2021-12-02 PROCEDURE — 97530 THERAPEUTIC ACTIVITIES: CPT | Performed by: PHYSICAL THERAPIST

## 2021-12-02 PROCEDURE — 97110 THERAPEUTIC EXERCISES: CPT | Performed by: PHYSICAL THERAPIST

## 2021-12-02 NOTE — PROGRESS NOTES
Physical Therapy Daily Progress Note/Discharge Summary        Patient: Rupesh Fox   : 1950  Diagnosis/ICD-10 Code:  Chronic hip pain, bilateral [M25.551, M25.552, G89.29]  Referring practitioner: Moreno Martinez MD  Date of Initial Visit: Type: THERAPY  Noted: 10/14/2021  Today's Date: 2021  Patient seen for 8 sessions         Rupesh Fox reports: he has only had (L) knee pain since last session.  He is doing good and ready to do the exercises on his own.         Subjective Evaluation    Pain  Current pain ratin  Pain scale at highest: 3-4/10 (L) knee only.       LEFS:70/80    Objective          Active Range of Motion   Left Hip   Flexion: WFL  Extension: WFL  Abduction: WFL  Adduction: WFL  External rotation (90/90): 42 degrees   Internal rotation (90/90): 31 degrees     Right Hip   Flexion: WFL  Extension: WFL  Abduction: WFL  Adduction: WFL  External rotation (90/90): 35 degrees   Internal rotation (90/90): 38 degrees   Left Knee   Flexion: 140 degrees   Extension: Left knee active extension: 1 deg HE.     Right Knee   Flexion: 142 degrees   Extension: Right knee active extension: 1 deg HE.     Strength/Myotome Testing     Left Hip   Planes of Motion   Flexion: 4+  Extension: 4+  Abduction: 4  Adduction: 4  External rotation: 4+  Internal rotation: 5    Right Hip   Planes of Motion   Flexion: 4+  Extension: 4  Abduction: 5  Adduction: 4  External rotation: 4+  Internal rotation: 5    Left Knee   Flexion: 5  Extension: 5    Right Knee   Flexion: 5  Extension: 5      See Exercise, Manual, and Modality Logs for complete treatment.       Assessment/Plan  Pt discharged this date with final HEP.  Reviewed all exercises and pt was able to return demonstrate proper technique/independence with exercises.  Decreased pain since eval as well as increased strength and ROM.  Improved LEFS to 70/80 indicating minimal perceived disability.  Appropriate for D/C per findings and pt  report.    Discharged to HEP    Goals  Plan Goals: STG: ~6 weeks  1. Pt will be demonstrate L knee 0-135 degrees of knee extension and flexion-MET  2. Pt will improve LEFS score to 50/80-MET  3. Pt will be able to walk for 30 minutes with knee pain <4/10-MET  4. Pt will demonstrate a SLR without extensor lag-MET  5. Pt will improve B hip IR>20 degrees-MET    LTG: ~12 weeks  1. Pt will be demonstrate L knee AROM 0-140 degrees of knee extension and flexion-MET  2. Pt will improve LEFS score to 55/80-MET  3. Pt will be able to walk for 45 minutes with knee pain <2/10-PROGRESSING  4. Pt will improve knee extensor and knee flexor strength to 5/5-PROGRESSING  5. Pt will be I with HEP-MET       Manual Therapy:         mins  81354;  Therapeutic Exercise:    40     mins  15431;     Neuromuscular [place]:        mins  59753;    Therapeutic Activity:     20     mins  52090;     Gait Training:           mins  95421;     Ultrasound:          mins  43270;    Electrical Stimulation:         mins  15112 ( );  Dry Needling          mins self-pay    Timed Treatment:   60   mins   Total Treatment:     60   mins    Elli Gaxiola, PTA  Physical Therapist Assistant    Haylie Jarrett PT  Physical Therapist

## 2021-12-02 NOTE — PATIENT INSTRUCTIONS
Access Code: [email or web address]  URL: [email or web address]  Date: 12/02/2021  Prepared by: Elli Gaxiola    Exercises  Seated Hamstring Stretch - 1 x daily - 7 x weekly - 3 sets - 10 hold  Supine ITB Stretch with Strap - 1 x daily - 7 x weekly - 1 sets - 5 reps - 10 hold  Bilateral Short Arc Quad Set - 1 x daily - 7 x weekly - 20 reps  Supine Bridge - 1 x daily - 7 x weekly - 20 reps  Active Straight Leg Raise with Quad Set - 1 x daily - 7 x weekly - 15 reps  Straight Leg Raise with External Rotation - 1 x daily - 7 x weekly - 15 reps  Sidelying Hip Abduction - 1 x daily - 7 x weekly - 15 reps  Sidelying Hip Adduction - 1 x daily - 7 x weekly - 1 sets - 15 reps  Clamshell - 1 x daily - 7 x weekly - 1 sets - 15 reps  Seated Long Arc Quad - 1 x daily - 7 x weekly - 15 reps  Seated Hamstring Curl with Anchored Resistance - 1 x daily - 7 x weekly - 1 sets - 20 reps  Heel Toe Raises with Counter Support - 1 x daily - 7 x weekly - 15 reps  Standing Hip Abduction - 1 x daily - 7 x weekly - 1 sets - 10 reps  Standing Hip Extension - 1 x daily - 7 x weekly - 1 sets - 10 reps  Standing Hip Flexion AROM - 1 x daily - 7 x weekly - 1 sets - 10 reps  Standing Terminal Knee Extension with Resistance - 1 x daily - 7 x weekly - 1 sets - 15 reps - 5 hold  Step Up - 1 x daily - 7 x weekly - 1 sets - 10 reps  Lateral Step Up - 1 x daily - 7 x weekly - 1 sets - 10 reps  Side Stepping with Resistance at Thighs - 1 x daily - 7 x weekly - 1 sets - 4 reps - 10 feet  Single Leg Balance with Clock Reach - 1 x daily - 7 x weekly - 1 sets - 10 reps

## 2022-01-06 ENCOUNTER — OFFICE VISIT (OUTPATIENT)
Dept: INTERNAL MEDICINE | Facility: CLINIC | Age: 72
End: 2022-01-06

## 2022-01-06 VITALS
OXYGEN SATURATION: 98 % | HEART RATE: 45 BPM | TEMPERATURE: 97.8 F | DIASTOLIC BLOOD PRESSURE: 72 MMHG | WEIGHT: 188.8 LBS | BODY MASS INDEX: 25.02 KG/M2 | HEIGHT: 73 IN | SYSTOLIC BLOOD PRESSURE: 130 MMHG | RESPIRATION RATE: 18 BRPM

## 2022-01-06 DIAGNOSIS — N40.1 BENIGN PROSTATIC HYPERPLASIA WITH URINARY FREQUENCY: ICD-10-CM

## 2022-01-06 DIAGNOSIS — I10 ESSENTIAL HYPERTENSION: ICD-10-CM

## 2022-01-06 DIAGNOSIS — E11.649 TYPE 2 DIABETES MELLITUS WITH HYPOGLYCEMIA WITHOUT COMA, WITH LONG-TERM CURRENT USE OF INSULIN: ICD-10-CM

## 2022-01-06 DIAGNOSIS — Z79.4 TYPE 2 DIABETES MELLITUS WITH HYPOGLYCEMIA WITHOUT COMA, WITH LONG-TERM CURRENT USE OF INSULIN: ICD-10-CM

## 2022-01-06 DIAGNOSIS — E78.2 MIXED HYPERLIPIDEMIA: ICD-10-CM

## 2022-01-06 DIAGNOSIS — R35.0 BENIGN PROSTATIC HYPERPLASIA WITH URINARY FREQUENCY: ICD-10-CM

## 2022-01-06 PROCEDURE — 99214 OFFICE O/P EST MOD 30 MIN: CPT | Performed by: INTERNAL MEDICINE

## 2022-01-06 PROCEDURE — 90746 HEPB VACCINE 3 DOSE ADULT IM: CPT | Performed by: INTERNAL MEDICINE

## 2022-01-06 PROCEDURE — G0010 ADMIN HEPATITIS B VACCINE: HCPCS | Performed by: INTERNAL MEDICINE

## 2022-01-06 RX ORDER — METFORMIN HYDROCHLORIDE 750 MG/1
750 TABLET, EXTENDED RELEASE ORAL
Qty: 90 TABLET | Refills: 2 | Status: SHIPPED | OUTPATIENT
Start: 2022-01-06 | End: 2022-04-08 | Stop reason: SDUPTHER

## 2022-01-06 RX ORDER — TERAZOSIN 10 MG/1
10 CAPSULE ORAL NIGHTLY
Qty: 90 CAPSULE | Refills: 2 | Status: SHIPPED | OUTPATIENT
Start: 2022-01-06 | End: 2022-04-08 | Stop reason: SDUPTHER

## 2022-01-06 RX ORDER — SIMVASTATIN 20 MG
20 TABLET ORAL NIGHTLY
Qty: 90 TABLET | Refills: 2 | Status: SHIPPED | OUTPATIENT
Start: 2022-01-06 | End: 2022-04-08 | Stop reason: SDUPTHER

## 2022-01-06 RX ORDER — FINASTERIDE 5 MG/1
5 TABLET, FILM COATED ORAL DAILY
Qty: 90 TABLET | Refills: 2 | Status: SHIPPED | OUTPATIENT
Start: 2022-01-06 | End: 2022-04-08 | Stop reason: SDUPTHER

## 2022-01-06 RX ORDER — LISINOPRIL 5 MG/1
5 TABLET ORAL DAILY
Qty: 90 TABLET | Refills: 2 | Status: SHIPPED | OUTPATIENT
Start: 2022-01-06 | End: 2022-04-08 | Stop reason: SDUPTHER

## 2022-01-06 NOTE — PROGRESS NOTES
"Chief Complaint  Diabetes (follow up ) and Immunizations (#3 Hep B )    Subjective          Rupesh Fox presents to [facility]  HLD, DM, HTN, doing well, no chest pain, sob, headaches, vision changes, no lows; keeping home log, normal    Following with ENT for deviated septum, turbinate issues, is planning for surgery with them to help with this; is using azelastine spray which is helping      Objective   Vital Signs:   /72   Pulse (!) 45   Temp 97.8 °F (36.6 °C)   Resp 18   Ht 185.4 cm (73\")   Wt 85.6 kg (188 lb 12.8 oz)   SpO2 98%   BMI 24.91 kg/m²     Physical Exam  Vitals and nursing note reviewed.   Constitutional:       General: He is not in acute distress.     Appearance: Normal appearance.   HENT:      Head: Normocephalic and atraumatic.      Right Ear: External ear normal.      Left Ear: External ear normal.      Nose: Nose normal.      Mouth/Throat:      Mouth: Mucous membranes are moist.      Pharynx: Oropharynx is clear.   Eyes:      Extraocular Movements: Extraocular movements intact.      Conjunctiva/sclera: Conjunctivae normal.      Pupils: Pupils are equal, round, and reactive to light.   Cardiovascular:      Rate and Rhythm: Normal rate and regular rhythm.      Pulses: Normal pulses.      Heart sounds: Normal heart sounds. No murmur heard.  No gallop.    Pulmonary:      Effort: Pulmonary effort is normal.      Breath sounds: Normal breath sounds.   Abdominal:      General: Abdomen is flat. Bowel sounds are normal. There is no distension.      Palpations: Abdomen is soft. There is no mass.      Tenderness: There is no abdominal tenderness.   Musculoskeletal:         General: No swelling. Normal range of motion.      Cervical back: Normal range of motion and neck supple.   Skin:     General: Skin is warm and dry.      Findings: No rash.   Neurological:      General: No focal deficit present.      Mental Status: He is alert and oriented " to person, place, and time. Mental status is at baseline.   Psychiatric:         Mood and Affect: Mood normal.         Behavior: Behavior normal.        Result Review :                 Assessment and Plan    Diagnoses and all orders for this visit:    1. Benign prostatic hyperplasia with urinary frequency  -     terazosin (HYTRIN) 10 MG capsule; Take 1 capsule by mouth Every Night.  Dispense: 90 capsule; Refill: 2  -     finasteride (PROSCAR) 5 MG tablet; Take 1 tablet by mouth Daily.  Dispense: 90 tablet; Refill: 2    2. Type 2 diabetes mellitus with hypoglycemia without coma, with long-term current use of insulin (HCC)  -     Comprehensive Metabolic Panel  -     Hemoglobin A1c  -     Lipid Panel With / Chol / HDL Ratio  -     Microalbumin / Creatinine Urine Ratio - Urine, Clean Catch  -     simvastatin (ZOCOR) 20 MG tablet; Take 1 tablet by mouth Every Night.  Dispense: 90 tablet; Refill: 2  -     metFORMIN ER (GLUCOPHAGE-XR) 750 MG 24 hr tablet; Take 1 tablet by mouth Daily With Breakfast.  Dispense: 90 tablet; Refill: 2    - feet ok 4/2/21  - lipids on statin, check labs  - eyes yearly recommended  - gaol a1c <7, metofrmin 750 er daily  - check microalbumin, on ace  - completing HepB series    3. Mixed hyperlipidemia  -     simvastatin (ZOCOR) 20 MG tablet; Take 1 tablet by mouth Every Night.  Dispense: 90 tablet; Refill: 2    4. Essential hypertension  -     Comprehensive Metabolic Panel  -     lisinopril (PRINIVIL,ZESTRIL) 5 MG tablet; Take 1 tablet by mouth Daily.  Dispense: 90 tablet; Refill: 2    - continue meds as above, discussed risks and benefits  - rtc to follow up3 t months    Follow Up   No follow-ups on file.  Patient was given instructions and counseling regarding his condition or for health maintenance advice. Please see specific information pulled into the AVS if appropriate.

## 2022-01-07 LAB
ALBUMIN SERPL-MCNC: 4.5 G/DL (ref 3.7–4.7)
ALBUMIN/CREAT UR: 2 MG/G CREAT (ref 0–29)
ALBUMIN/GLOB SERPL: 2.4 {RATIO} (ref 1.2–2.2)
ALP SERPL-CCNC: 65 IU/L (ref 44–121)
ALT SERPL-CCNC: 15 IU/L (ref 0–44)
AST SERPL-CCNC: 23 IU/L (ref 0–40)
BILIRUB SERPL-MCNC: 0.9 MG/DL (ref 0–1.2)
BUN SERPL-MCNC: 23 MG/DL (ref 8–27)
BUN/CREAT SERPL: 19 (ref 10–24)
CALCIUM SERPL-MCNC: 9.6 MG/DL (ref 8.6–10.2)
CHLORIDE SERPL-SCNC: 106 MMOL/L (ref 96–106)
CHOLEST SERPL-MCNC: 131 MG/DL (ref 100–199)
CHOLEST/HDLC SERPL: 2.3 RATIO (ref 0–5)
CO2 SERPL-SCNC: 22 MMOL/L (ref 20–29)
CREAT SERPL-MCNC: 1.22 MG/DL (ref 0.76–1.27)
CREAT UR-MCNC: 179.9 MG/DL
GLOBULIN SER CALC-MCNC: 1.9 G/DL (ref 1.5–4.5)
GLUCOSE SERPL-MCNC: 132 MG/DL (ref 65–99)
HBA1C MFR BLD: 6.3 % (ref 4.8–5.6)
HDLC SERPL-MCNC: 57 MG/DL
LDLC SERPL CALC-MCNC: 64 MG/DL (ref 0–99)
MICROALBUMIN UR-MCNC: 4.4 UG/ML
POTASSIUM SERPL-SCNC: 4.6 MMOL/L (ref 3.5–5.2)
PROT SERPL-MCNC: 6.4 G/DL (ref 6–8.5)
SODIUM SERPL-SCNC: 143 MMOL/L (ref 134–144)
TRIGL SERPL-MCNC: 42 MG/DL (ref 0–149)
VLDLC SERPL CALC-MCNC: 10 MG/DL (ref 5–40)

## 2022-04-08 ENCOUNTER — OFFICE VISIT (OUTPATIENT)
Dept: INTERNAL MEDICINE | Facility: CLINIC | Age: 72
End: 2022-04-08

## 2022-04-08 VITALS
DIASTOLIC BLOOD PRESSURE: 74 MMHG | TEMPERATURE: 97.3 F | HEART RATE: 51 BPM | SYSTOLIC BLOOD PRESSURE: 124 MMHG | BODY MASS INDEX: 25.05 KG/M2 | WEIGHT: 189 LBS | HEIGHT: 73 IN | OXYGEN SATURATION: 99 % | RESPIRATION RATE: 16 BRPM

## 2022-04-08 DIAGNOSIS — E11.649 TYPE 2 DIABETES MELLITUS WITH HYPOGLYCEMIA WITHOUT COMA, WITH LONG-TERM CURRENT USE OF INSULIN: Primary | ICD-10-CM

## 2022-04-08 DIAGNOSIS — N40.1 BENIGN PROSTATIC HYPERPLASIA WITH URINARY FREQUENCY: ICD-10-CM

## 2022-04-08 DIAGNOSIS — Z79.4 TYPE 2 DIABETES MELLITUS WITH HYPOGLYCEMIA WITHOUT COMA, WITH LONG-TERM CURRENT USE OF INSULIN: Primary | ICD-10-CM

## 2022-04-08 DIAGNOSIS — I10 ESSENTIAL HYPERTENSION: ICD-10-CM

## 2022-04-08 DIAGNOSIS — E78.2 MIXED HYPERLIPIDEMIA: ICD-10-CM

## 2022-04-08 DIAGNOSIS — R35.0 BENIGN PROSTATIC HYPERPLASIA WITH URINARY FREQUENCY: ICD-10-CM

## 2022-04-08 PROCEDURE — 99214 OFFICE O/P EST MOD 30 MIN: CPT | Performed by: INTERNAL MEDICINE

## 2022-04-08 RX ORDER — FINASTERIDE 5 MG/1
5 TABLET, FILM COATED ORAL DAILY
Qty: 90 TABLET | Refills: 2 | Status: SHIPPED | OUTPATIENT
Start: 2022-04-08 | End: 2022-07-08 | Stop reason: SDUPTHER

## 2022-04-08 RX ORDER — METFORMIN HYDROCHLORIDE 750 MG/1
750 TABLET, EXTENDED RELEASE ORAL
Qty: 90 TABLET | Refills: 2 | Status: SHIPPED | OUTPATIENT
Start: 2022-04-08 | End: 2022-07-08 | Stop reason: SDUPTHER

## 2022-04-08 RX ORDER — SIMVASTATIN 20 MG
20 TABLET ORAL NIGHTLY
Qty: 90 TABLET | Refills: 2 | Status: SHIPPED | OUTPATIENT
Start: 2022-04-08 | End: 2022-07-08 | Stop reason: SDUPTHER

## 2022-04-08 RX ORDER — TERAZOSIN 10 MG/1
10 CAPSULE ORAL NIGHTLY
Qty: 90 CAPSULE | Refills: 2 | Status: SHIPPED | OUTPATIENT
Start: 2022-04-08 | End: 2022-07-08 | Stop reason: SDUPTHER

## 2022-04-08 RX ORDER — LISINOPRIL 5 MG/1
5 TABLET ORAL DAILY
Qty: 90 TABLET | Refills: 2 | Status: SHIPPED | OUTPATIENT
Start: 2022-04-08 | End: 2022-07-08 | Stop reason: SDUPTHER

## 2022-04-08 NOTE — PROGRESS NOTES
"Chief Complaint  Diabetes (3 MO F/U)    Subjective          Rupesh Fox presents to [facility]  HLD, DM, HTN, doing well, no chest pain, sob, headaches, vision changes, no lows; keeping home log, normal    Did have nasal surgery in 2/2022, feels nasal passages more open    BPH, doing well with finasteride, terazosin    Diabetes  He has type 2 diabetes mellitus. No MedicAlert identification noted. The initial diagnosis of diabetes was made 15 years ago. Pertinent negatives for hypoglycemia include no confusion, dizziness, headaches, hunger, mood changes, nervousness/anxiousness, pallor, seizures, sleepiness, speech difficulty, sweats or tremors. Associated symptoms include polyuria. Pertinent negatives for diabetes include no blurred vision, no chest pain, no fatigue, no foot paresthesias, no foot ulcerations, no polydipsia, no polyphagia, no visual change, no weakness and no weight loss. Pertinent negatives for hypoglycemia complications include no blackouts, no hospitalization, no nocturnal hypoglycemia, no required assistance and no required glucagon injection. Pertinent negatives for diabetic complications include no CVA, heart disease, impotence, nephropathy, peripheral neuropathy, PVD or retinopathy. There are no known risk factors for coronary artery disease. Current diabetic treatment includes diet and oral agent (monotherapy). He is compliant with treatment all of the time. He is following a generally healthy diet. When asked about meal planning, he reported none. He has not had a previous visit with a dietitian. He participates in exercise daily. He monitors blood glucose at home 1-2 x per week. Blood glucose monitoring compliance is good. He does not see a podiatrist.Eye exam is current.       Objective   Vital Signs:   /74   Pulse 51   Temp 97.3 °F (36.3 °C)   Resp 16   Ht 185.4 cm (73\")   Wt 85.7 kg (189 lb)   SpO2 99%   BMI 24.94 kg/m² "     BMI is within normal parameters. No follow-up required.      Physical Exam  Vitals and nursing note reviewed.   Constitutional:       General: He is not in acute distress.     Appearance: Normal appearance.   HENT:      Head: Normocephalic and atraumatic.      Right Ear: External ear normal.      Left Ear: External ear normal.      Nose: Nose normal.      Mouth/Throat:      Mouth: Mucous membranes are moist.      Pharynx: Oropharynx is clear.   Eyes:      Extraocular Movements: Extraocular movements intact.      Conjunctiva/sclera: Conjunctivae normal.      Pupils: Pupils are equal, round, and reactive to light.   Cardiovascular:      Rate and Rhythm: Normal rate and regular rhythm.      Pulses: Normal pulses.      Heart sounds: Normal heart sounds. No murmur heard.    No gallop.   Pulmonary:      Effort: Pulmonary effort is normal.      Breath sounds: Normal breath sounds.   Abdominal:      General: Abdomen is flat. Bowel sounds are normal. There is no distension.      Palpations: Abdomen is soft. There is no mass.      Tenderness: There is no abdominal tenderness.   Musculoskeletal:         General: No swelling. Normal range of motion.      Cervical back: Normal range of motion and neck supple.   Skin:     General: Skin is warm and dry.      Findings: No rash.   Neurological:      General: No focal deficit present.      Mental Status: He is alert and oriented to person, place, and time. Mental status is at baseline.   Psychiatric:         Mood and Affect: Mood normal.         Behavior: Behavior normal.        Result Review :                 Assessment and Plan    Diagnoses and all orders for this visit:    1. Type 2 diabetes mellitus with hypoglycemia without coma, with long-term current use of insulin (HCC) (Primary)  -     Comprehensive Metabolic Panel  -     Hemoglobin A1c  -     Lipid Panel With / Chol / HDL Ratio  -     Microalbumin / Creatinine Urine Ratio - Urine, Clean Catch    2. Mixed  hyperlipidemia  -     Lipid Panel With / Chol / HDL Ratio    3. Benign prostatic hyperplasia with urinary frequency    - feet ok 4/2/21  - lipids on statin, check labs  - eyes yearly recommended  - gaol a1c <7, metofrmin 750 er daily  - check microalbumin, on ace  - completing HepB series    - conitnue finasteride, terazosin, doing well and at goal for symptoms control  - continue simvastatin, doing well    Follow Up   No follow-ups on file.  Patient was given instructions and counseling regarding his condition or for health maintenance advice. Please see specific information pulled into the AVS if appropriate.       Answers for HPI/ROS submitted by the patient on 4/7/2022  What is the primary reason for your visit?: Diabetes

## 2022-04-09 LAB
ALBUMIN SERPL-MCNC: 4.4 G/DL (ref 3.7–4.7)
ALBUMIN/CREAT UR: <2 MG/G CREAT (ref 0–29)
ALBUMIN/GLOB SERPL: 2.2 {RATIO} (ref 1.2–2.2)
ALP SERPL-CCNC: 72 IU/L (ref 44–121)
ALT SERPL-CCNC: 20 IU/L (ref 0–44)
AST SERPL-CCNC: 24 IU/L (ref 0–40)
BILIRUB SERPL-MCNC: 0.7 MG/DL (ref 0–1.2)
BUN SERPL-MCNC: 27 MG/DL (ref 8–27)
BUN/CREAT SERPL: 25 (ref 10–24)
CALCIUM SERPL-MCNC: 9.8 MG/DL (ref 8.6–10.2)
CHLORIDE SERPL-SCNC: 107 MMOL/L (ref 96–106)
CHOLEST SERPL-MCNC: 125 MG/DL (ref 100–199)
CHOLEST/HDLC SERPL: 2 RATIO (ref 0–5)
CO2 SERPL-SCNC: 26 MMOL/L (ref 20–29)
CREAT SERPL-MCNC: 1.08 MG/DL (ref 0.76–1.27)
CREAT UR-MCNC: 125.6 MG/DL
EGFRCR SERPLBLD CKD-EPI 2021: 73 ML/MIN/1.73
GLOBULIN SER CALC-MCNC: 2 G/DL (ref 1.5–4.5)
GLUCOSE SERPL-MCNC: 122 MG/DL (ref 65–99)
HBA1C MFR BLD: 6.3 % (ref 4.8–5.6)
HDLC SERPL-MCNC: 64 MG/DL
LDLC SERPL CALC-MCNC: 51 MG/DL (ref 0–99)
MICROALBUMIN UR-MCNC: <3 UG/ML
POTASSIUM SERPL-SCNC: 5.2 MMOL/L (ref 3.5–5.2)
PROT SERPL-MCNC: 6.4 G/DL (ref 6–8.5)
SODIUM SERPL-SCNC: 144 MMOL/L (ref 134–144)
TRIGL SERPL-MCNC: 36 MG/DL (ref 0–149)
VLDLC SERPL CALC-MCNC: 10 MG/DL (ref 5–40)

## 2022-07-08 ENCOUNTER — OFFICE VISIT (OUTPATIENT)
Dept: INTERNAL MEDICINE | Facility: CLINIC | Age: 72
End: 2022-07-08

## 2022-07-08 VITALS
SYSTOLIC BLOOD PRESSURE: 126 MMHG | WEIGHT: 184 LBS | DIASTOLIC BLOOD PRESSURE: 70 MMHG | BODY MASS INDEX: 24.39 KG/M2 | HEART RATE: 40 BPM | HEIGHT: 73 IN | OXYGEN SATURATION: 98 % | TEMPERATURE: 97.8 F | RESPIRATION RATE: 18 BRPM

## 2022-07-08 DIAGNOSIS — R35.0 BENIGN PROSTATIC HYPERPLASIA WITH URINARY FREQUENCY: ICD-10-CM

## 2022-07-08 DIAGNOSIS — Z79.4 TYPE 2 DIABETES MELLITUS WITH HYPOGLYCEMIA WITHOUT COMA, WITH LONG-TERM CURRENT USE OF INSULIN: ICD-10-CM

## 2022-07-08 DIAGNOSIS — I10 ESSENTIAL HYPERTENSION: ICD-10-CM

## 2022-07-08 DIAGNOSIS — N40.1 BENIGN PROSTATIC HYPERPLASIA WITH URINARY FREQUENCY: ICD-10-CM

## 2022-07-08 DIAGNOSIS — E78.2 MIXED HYPERLIPIDEMIA: ICD-10-CM

## 2022-07-08 DIAGNOSIS — Z79.4 TYPE 2 DIABETES MELLITUS WITH HYPOGLYCEMIA WITHOUT COMA, WITH LONG-TERM CURRENT USE OF INSULIN: Primary | ICD-10-CM

## 2022-07-08 DIAGNOSIS — R68.89 EXCESSIVE ORAL SECRETIONS: ICD-10-CM

## 2022-07-08 DIAGNOSIS — E11.649 TYPE 2 DIABETES MELLITUS WITH HYPOGLYCEMIA WITHOUT COMA, WITH LONG-TERM CURRENT USE OF INSULIN: Primary | ICD-10-CM

## 2022-07-08 DIAGNOSIS — E11.649 TYPE 2 DIABETES MELLITUS WITH HYPOGLYCEMIA WITHOUT COMA, WITH LONG-TERM CURRENT USE OF INSULIN: ICD-10-CM

## 2022-07-08 PROCEDURE — 99214 OFFICE O/P EST MOD 30 MIN: CPT | Performed by: INTERNAL MEDICINE

## 2022-07-08 RX ORDER — LISINOPRIL 5 MG/1
5 TABLET ORAL DAILY
Qty: 90 TABLET | Refills: 2 | Status: SHIPPED | OUTPATIENT
Start: 2022-07-08 | End: 2023-04-06

## 2022-07-08 RX ORDER — FINASTERIDE 5 MG/1
5 TABLET, FILM COATED ORAL DAILY
Qty: 90 TABLET | Refills: 2 | Status: SHIPPED | OUTPATIENT
Start: 2022-07-08 | End: 2023-04-06

## 2022-07-08 RX ORDER — GLYCOPYRROLATE 1 MG/1
1 TABLET ORAL 2 TIMES DAILY
Qty: 60 TABLET | Refills: 3 | Status: SHIPPED | OUTPATIENT
Start: 2022-07-08 | End: 2022-10-18

## 2022-07-08 RX ORDER — METFORMIN HYDROCHLORIDE 750 MG/1
750 TABLET, EXTENDED RELEASE ORAL
Qty: 90 TABLET | Refills: 2 | Status: SHIPPED | OUTPATIENT
Start: 2022-07-08 | End: 2023-04-06

## 2022-07-08 RX ORDER — TERAZOSIN 10 MG/1
10 CAPSULE ORAL NIGHTLY
Qty: 90 CAPSULE | Refills: 2 | Status: SHIPPED | OUTPATIENT
Start: 2022-07-08

## 2022-07-08 RX ORDER — SIMVASTATIN 20 MG
20 TABLET ORAL NIGHTLY
Qty: 90 TABLET | Refills: 2 | Status: SHIPPED | OUTPATIENT
Start: 2022-07-08

## 2022-07-08 NOTE — TELEPHONE ENCOUNTER
Rx Refill Note  Requested Prescriptions     Pending Prescriptions Disp Refills   • finasteride (PROSCAR) 5 MG tablet 90 tablet 2     Sig: Take 1 tablet by mouth Daily.   • lisinopril (PRINIVIL,ZESTRIL) 5 MG tablet 90 tablet 2     Sig: Take 1 tablet by mouth Daily.   • metFORMIN ER (GLUCOPHAGE-XR) 750 MG 24 hr tablet 90 tablet 2     Sig: Take 1 tablet by mouth Daily With Breakfast.   • simvastatin (ZOCOR) 20 MG tablet 90 tablet 2     Sig: Take 1 tablet by mouth Every Night.   • terazosin (HYTRIN) 10 MG capsule 90 capsule 2     Sig: Take 1 capsule by mouth Every Night.      Last office visit with prescribing clinician: 7/8/2022      Next office visit with prescribing clinician: Visit date not found            Avelino Madrid MA  07/08/22, 08:07 EDT

## 2022-07-08 NOTE — PROGRESS NOTES
"Chief Complaint  Diabetes (Follow up )    Subjective        Rupesh Fox presents to [facility]  HLD, DM, HTN, doing well, no chest pain, sob, headaches, vision changes, no lows; keeping home log, normal      Diabetes  He has type 2 diabetes mellitus. No MedicAlert identification noted. The initial diagnosis of diabetes was made 15 years ago. Pertinent negatives for hypoglycemia include no confusion, dizziness, headaches, hunger, mood changes, nervousness/anxiousness, pallor, seizures, sleepiness, speech difficulty, sweats or tremors. Associated symptoms include polyuria. Pertinent negatives for diabetes include no blurred vision, no chest pain, no fatigue, no foot paresthesias, no foot ulcerations, no polydipsia, no polyphagia, no visual change, no weakness and no weight loss. Pertinent negatives for hypoglycemia complications include no blackouts, no hospitalization, no nocturnal hypoglycemia, no required assistance and no required glucagon injection. Pertinent negatives for diabetic complications include no CVA, heart disease, impotence, nephropathy, peripheral neuropathy, PVD or retinopathy. Risk factors for coronary artery disease include hypertension. Current diabetic treatment includes diet and oral agent (monotherapy). He is compliant with treatment all of the time. His weight is stable. He is following a generally healthy diet. He has not had a previous visit with a dietitian. He participates in exercise daily. He monitors blood glucose at home 1-2 x per week. Blood glucose monitoring compliance is adequate. There is no change in his home blood glucose trend. His breakfast blood glucose is taken between 6-7 am. His breakfast blood glucose range is generally 110-130 mg/dl. He does not see a podiatrist.Eye exam is current.       Objective   Vital Signs:  /70   Pulse (!) 40   Temp 97.8 °F (36.6 °C)   Resp 18   Ht 185.4 cm (73\")   Wt 83.5 kg (184 lb) " "  SpO2 98%   BMI 24.28 kg/m²   Estimated body mass index is 24.28 kg/m² as calculated from the following:    Height as of this encounter: 185.4 cm (73\").    Weight as of this encounter: 83.5 kg (184 lb).    BMI is within normal parameters. No other follow-up for BMI required.      Physical Exam  Vitals and nursing note reviewed.   Constitutional:       General: He is not in acute distress.     Appearance: Normal appearance.   HENT:      Head: Normocephalic and atraumatic.      Right Ear: External ear normal.      Left Ear: External ear normal.      Nose: Nose normal.      Mouth/Throat:      Mouth: Mucous membranes are moist.      Pharynx: Oropharynx is clear.   Eyes:      Extraocular Movements: Extraocular movements intact.      Conjunctiva/sclera: Conjunctivae normal.      Pupils: Pupils are equal, round, and reactive to light.   Cardiovascular:      Rate and Rhythm: Normal rate and regular rhythm.      Pulses: Normal pulses.      Heart sounds: Normal heart sounds. No murmur heard.    No gallop.   Pulmonary:      Effort: Pulmonary effort is normal.      Breath sounds: Normal breath sounds.   Abdominal:      General: Abdomen is flat. Bowel sounds are normal. There is no distension.      Palpations: Abdomen is soft. There is no mass.      Tenderness: There is no abdominal tenderness.   Musculoskeletal:         General: No swelling. Normal range of motion.      Cervical back: Normal range of motion and neck supple.   Skin:     General: Skin is warm and dry.      Findings: No rash.   Neurological:      General: No focal deficit present.      Mental Status: He is alert and oriented to person, place, and time. Mental status is at baseline.   Psychiatric:         Mood and Affect: Mood normal.         Behavior: Behavior normal.        Result Review :                Assessment and Plan   Diagnoses and all orders for this visit:    1. Type 2 diabetes mellitus with hypoglycemia without coma, with long-term current use of " insulin (HCC) (Primary)  -     Comprehensive Metabolic Panel  -     Hemoglobin A1c  -     Lipid Panel With / Chol / HDL Ratio  -     Microalbumin / Creatinine Urine Ratio - Urine, Clean Catch    2. Mixed hyperlipidemia  -     Lipid Panel With / Chol / HDL Ratio    3. Benign prostatic hyperplasia with urinary frequency    4. Essential hypertension  -     Comprehensive Metabolic Panel    5. Excessive oral secretions  -     glycopyrrolate (ROBINUL) 1 MG tablet; Take 1 tablet by mouth 2 (Two) Times a Day.  Dispense: 60 tablet; Refill: 3    - feet ok 7/8/22  - lipids on statin, adjust to goal  - eyse check yearly  - goal a1c <7, adjust meds to goal  - check microalbumin, on acei for htn  - hepB done, needs PCV20    - BP at goal, doing wlel, noted HR, has been consistent for him, sinus laith, no dizziness, no lightheadedness    - continue terazosin for BPH, doing well    - start glycopyrolate for oral secrections; may be more likely related to seasonal allergies but without eye or nasal symptoms seems less likely  - rtc to follow up 3 months or sooner pending above           Follow Up   No follow-ups on file.  Patient was given instructions and counseling regarding his condition or for health maintenance advice. Please see specific information pulled into the AVS if appropriate.       Answers for HPI/ROS submitted by the patient on 7/2/2022  What is the primary reason for your visit?: Diabetes

## 2022-07-09 LAB
ALBUMIN SERPL-MCNC: 4.5 G/DL (ref 3.7–4.7)
ALBUMIN/CREAT UR: 3 MG/G CREAT (ref 0–29)
ALBUMIN/GLOB SERPL: 2 {RATIO} (ref 1.2–2.2)
ALP SERPL-CCNC: 61 IU/L (ref 44–121)
ALT SERPL-CCNC: 17 IU/L (ref 0–44)
AST SERPL-CCNC: 25 IU/L (ref 0–40)
BILIRUB SERPL-MCNC: 0.8 MG/DL (ref 0–1.2)
BUN SERPL-MCNC: 22 MG/DL (ref 8–27)
BUN/CREAT SERPL: 21 (ref 10–24)
CALCIUM SERPL-MCNC: 9.5 MG/DL (ref 8.6–10.2)
CHLORIDE SERPL-SCNC: 106 MMOL/L (ref 96–106)
CHOLEST SERPL-MCNC: 133 MG/DL (ref 100–199)
CHOLEST/HDLC SERPL: 2.4 RATIO (ref 0–5)
CO2 SERPL-SCNC: 25 MMOL/L (ref 20–29)
CREAT SERPL-MCNC: 1.07 MG/DL (ref 0.76–1.27)
CREAT UR-MCNC: 176.2 MG/DL
EGFRCR SERPLBLD CKD-EPI 2021: 74 ML/MIN/1.73
GLOBULIN SER CALC-MCNC: 2.3 G/DL (ref 1.5–4.5)
GLUCOSE SERPL-MCNC: 124 MG/DL (ref 65–99)
HBA1C MFR BLD: 6.5 % (ref 4.8–5.6)
HDLC SERPL-MCNC: 55 MG/DL
LDLC SERPL CALC-MCNC: 65 MG/DL (ref 0–99)
MICROALBUMIN UR-MCNC: 4.8 UG/ML
POTASSIUM SERPL-SCNC: 4.8 MMOL/L (ref 3.5–5.2)
PROT SERPL-MCNC: 6.8 G/DL (ref 6–8.5)
SODIUM SERPL-SCNC: 144 MMOL/L (ref 134–144)
TRIGL SERPL-MCNC: 61 MG/DL (ref 0–149)
VLDLC SERPL CALC-MCNC: 13 MG/DL (ref 5–40)

## 2022-10-18 ENCOUNTER — OFFICE VISIT (OUTPATIENT)
Dept: INTERNAL MEDICINE | Facility: CLINIC | Age: 72
End: 2022-10-18

## 2022-10-18 VITALS
BODY MASS INDEX: 24.18 KG/M2 | OXYGEN SATURATION: 98 % | HEIGHT: 73 IN | RESPIRATION RATE: 16 BRPM | HEART RATE: 42 BPM | WEIGHT: 182.4 LBS | TEMPERATURE: 98.4 F | DIASTOLIC BLOOD PRESSURE: 68 MMHG | SYSTOLIC BLOOD PRESSURE: 124 MMHG

## 2022-10-18 DIAGNOSIS — E11.649 TYPE 2 DIABETES MELLITUS WITH HYPOGLYCEMIA WITHOUT COMA, WITH LONG-TERM CURRENT USE OF INSULIN: Primary | ICD-10-CM

## 2022-10-18 DIAGNOSIS — I10 ESSENTIAL HYPERTENSION: ICD-10-CM

## 2022-10-18 DIAGNOSIS — Z23 ENCOUNTER FOR IMMUNIZATION: ICD-10-CM

## 2022-10-18 DIAGNOSIS — Z00.00 WELL ADULT EXAM: ICD-10-CM

## 2022-10-18 DIAGNOSIS — Z79.4 TYPE 2 DIABETES MELLITUS WITH HYPOGLYCEMIA WITHOUT COMA, WITH LONG-TERM CURRENT USE OF INSULIN: Primary | ICD-10-CM

## 2022-10-18 PROCEDURE — 1159F MED LIST DOCD IN RCRD: CPT | Performed by: INTERNAL MEDICINE

## 2022-10-18 PROCEDURE — G0439 PPPS, SUBSEQ VISIT: HCPCS | Performed by: INTERNAL MEDICINE

## 2022-10-18 PROCEDURE — 90677 PCV20 VACCINE IM: CPT | Performed by: INTERNAL MEDICINE

## 2022-10-18 PROCEDURE — G0008 ADMIN INFLUENZA VIRUS VAC: HCPCS | Performed by: INTERNAL MEDICINE

## 2022-10-18 PROCEDURE — 90662 IIV NO PRSV INCREASED AG IM: CPT | Performed by: INTERNAL MEDICINE

## 2022-10-18 PROCEDURE — 1170F FXNL STATUS ASSESSED: CPT | Performed by: INTERNAL MEDICINE

## 2022-10-18 NOTE — PROGRESS NOTES
The ABCs of the Annual Wellness Visit  Subsequent Medicare Wellness Visit    Chief Complaint   Patient presents with   • Annual Exam     Medicare Wellness    • Flu Vaccine      Subjective    History of Present Illness:  Rupesh Fox is a 72 y.o. male who presents for a Subsequent Medicare Wellness Visit.    The following portions of the patient's history were reviewed and   updated as appropriate: allergies, current medications, past family history, past medical history, past social history, past surgical history and problem list.    Compared to one year ago, the patient feels his physical   health is the same.    Compared to one year ago, the patient feels his mental   health is the same.    Recent Hospitalizations:  He was not admitted to the hospital during the last year.       Current Medical Providers:  Patient Care Team:  Moreno Martinez MD as PCP - General (Internal Medicine)    Outpatient Medications Prior to Visit   Medication Sig Dispense Refill   • finasteride (PROSCAR) 5 MG tablet Take 1 tablet by mouth Daily. 90 tablet 2   • glucose blood test strip Use as instructed 360 each 12   • lisinopril (PRINIVIL,ZESTRIL) 5 MG tablet Take 1 tablet by mouth Daily. 90 tablet 2   • metFORMIN ER (GLUCOPHAGE-XR) 750 MG 24 hr tablet Take 1 tablet by mouth Daily With Breakfast. 90 tablet 2   • simvastatin (ZOCOR) 20 MG tablet Take 1 tablet by mouth Every Night. 90 tablet 2   • terazosin (HYTRIN) 10 MG capsule Take 1 capsule by mouth Every Night. 90 capsule 2   • glycopyrrolate (ROBINUL) 1 MG tablet Take 1 tablet by mouth 2 (Two) Times a Day. 60 tablet 3     No facility-administered medications prior to visit.       No opioid medication identified on active medication list. I have reviewed chart for other potential  high risk medication/s and harmful drug interactions in the elderly.          Aspirin is not on active medication list.  Aspirin use is not indicated based on review of current medical condition/s. Risk  "of harm outweighs potential benefits.  .    Patient Active Problem List   Diagnosis   • Hx of colonic polyps   • Acid reflux   • Can't get food down   • Gastrointestinal ulcer due to Helicobacter pylori     Advance Care Planning  Advance Directive is not on file.  ACP discussion was held with the patient during this visit. Patient does not have an advance directive, information provided.       ECG 12 Lead    Date/Time: 10/25/2022 8:44 AM  Performed by: Moreno Martinez MD  Authorized by: Moreno Martinez MD   Comparison: compared with previous ECG from 2021  Rhythm: sinus bradycardia  Rate: normal  Conduction: conduction normal  ST Segments: ST segments normal  T Waves: T waves normal  QRS axis: normal    Clinical impression: normal ECG                  Objective    Vitals:    10/18/22 0810   BP: 124/68   Pulse: (!) 42   Resp: 16   Temp: 98.4 °F (36.9 °C)   SpO2: 98%   Weight: 82.7 kg (182 lb 6.4 oz)   Height: 185.4 cm (73\")     Estimated body mass index is 24.06 kg/m² as calculated from the following:    Height as of this encounter: 185.4 cm (73\").    Weight as of this encounter: 82.7 kg (182 lb 6.4 oz).    BMI is within normal parameters. No other follow-up for BMI required.      Does the patient have evidence of cognitive impairment? No    Physical Exam            HEALTH RISK ASSESSMENT    Smoking Status:  Social History     Tobacco Use   Smoking Status Former   • Types: Cigarettes   • Quit date:    • Years since quittin.8   Smokeless Tobacco Never     Alcohol Consumption:  Social History     Substance and Sexual Activity   Alcohol Use Yes    Comment: social minimal     Fall Risk Screen:    STEADI Fall Risk Assessment was completed, and patient is at LOW risk for falls.Assessment completed on:10/18/2022    Depression Screening:  PHQ-2/PHQ-9 Depression Screening 10/18/2022   Retired PHQ-9 Total Score -   Retired Total Score -   Little Interest or Pleasure in Doing Things 0-->not at all   Feeling " Down, Depressed or Hopeless 0-->not at all   Trouble Falling or Staying Asleep, or Sleeping Too Much 0-->not at all   Feeling Tired or Having Little Energy 0-->not at all   Poor Appetite or Overeating 0-->not at all   Feeling Bad about Yourself - or that You are a Failure or Have Let Yourself or Your Family Down 0-->not at all   Trouble Concentrating on Things, Such as Reading the Newspaper or Watching Television 0-->not at all   Moving or Speaking So Slowly that Other People Could Have Noticed? Or the Opposite - Being So Fidgety 0-->not at all   Thoughts that You Would be Better Off Dead or of Hurting Yourself in Some Way 0-->not at all   PHQ-9: Brief Depression Severity Measure Score 0   If You Checked Off Any Problems, How Difficult Have These Problems Made It For You to Do Your Work, Take Care of Things at Home, or Get Along with Other People? not difficult at all       Health Habits and Functional and Cognitive Screening:  Functional & Cognitive Status 10/18/2022   Do you have difficulty preparing food and eating? No   Do you have difficulty bathing yourself, getting dressed or grooming yourself? No   Do you have difficulty using the toilet? No   Do you have difficulty moving around from place to place? No   Do you have trouble with steps or getting out of a bed or a chair? No   Current Diet Well Balanced Diet   Dental Exam Up to date   Eye Exam Up to date   Exercise (times per week) 5 times per week   Current Exercises Include Walking   Do you need help using the phone?  No   Are you deaf or do you have serious difficulty hearing?  No   Do you need help with transportation? No   Do you need help shopping? No   Do you need help preparing meals?  No   Do you need help with housework?  No   Do you need help with laundry? No   Do you need help taking your medications? No   Do you need help managing money? No   Do you ever drive or ride in a car without wearing a seat belt? No   Have you felt unusual stress, anger  or loneliness in the last month? No   Who do you live with? Spouse   If you need help, do you have trouble finding someone available to you? No   Have you been bothered in the last four weeks by sexual problems? No   Do you have difficulty concentrating, remembering or making decisions? No       Age-appropriate Screening Schedule:  Refer to the list below for future screening recommendations based on patient's age, sex and/or medical conditions. Orders for these recommended tests are listed in the plan section. The patient has been provided with a written plan.    Health Maintenance   Topic Date Due   • DIABETIC EYE EXAM  04/23/2020   • ZOSTER VACCINE (3 of 3) 02/24/2022   • DIABETIC FOOT EXAM  07/02/2022   • INFLUENZA VACCINE  08/01/2022   • HEMOGLOBIN A1C  01/08/2023   • LIPID PANEL  07/08/2023   • URINE MICROALBUMIN  07/08/2023   • TDAP/TD VACCINES (2 - Td or Tdap) 01/14/2030              Assessment & Plan   CMS Preventative Services Quick Reference  Risk Factors Identified During Encounter  Cardiovascular Disease  Immunizations Discussed/Encouraged (specific Immunizations; Influenza and Prevnar 20 (Pneumococcal 20-valent conjugate)  The above risks/problems have been discussed with the patient.  Follow up actions/plans if indicated are seen below in the Assessment/Plan Section.  Pertinent information has been shared with the patient in the After Visit Summary.    Diagnoses and all orders for this visit:    1. Type 2 diabetes mellitus with hypoglycemia without coma, with long-term current use of insulin (HCC) (Primary)  -     Hemoglobin A1c  -     Fluzone High-Dose 65+yrs (6247-9900)  -     Pneumococcal Conjugate Vaccine 20-Valent (PCV20)  -     Microalbumin / Creatinine Urine Ratio - Urine, Clean Catch    2. Well adult exam  -     Comprehensive Metabolic Panel  -     Lipid Panel  -     Hemoglobin A1c  -     CBC & Differential  -     Fluzone High-Dose 65+yrs (4494-0403)  -     Pneumococcal Conjugate Vaccine  20-Valent (PCV20)    3. Essential hypertension  -     ECG 12 Lead    4. Encounter for immunization  -     Fluzone High-Dose 65+yrs ([id number])  -     Pneumococcal Conjugate Vaccine 20-Valent (PCV20)        Follow Up:   No follow-ups on file.     An After Visit Summary and PPPS were made available to the patient.

## 2022-10-18 NOTE — PROGRESS NOTES
"Chief Complaint   Patient presents with   • Annual Exam     Medicare Wellness    • Flu Vaccine       Subjective   Rupesh Fox is a 72 y.o. male.     History of Present Illness     The following portions of the patient's history were reviewed and updated as appropriate: allergies, current medications, past family history, past medical history, past social history, past surgical history, and problem list.    Review of Systems      Objective   Body mass index is 24.06 kg/m².   Vitals:    10/18/22 0810   BP: 124/68   Pulse: (!) 42   Resp: 16   Temp: 98.4 °F (36.9 °C)   SpO2: 98%   Weight: 82.7 kg (182 lb 6.4 oz)   Height: 185.4 cm (73\")        Physical Exam  Vitals and nursing note reviewed.   Constitutional:       General: He is not in acute distress.     Appearance: Normal appearance.   HENT:      Head: Normocephalic and atraumatic.      Right Ear: External ear normal.      Left Ear: External ear normal.      Nose: Nose normal.      Mouth/Throat:      Mouth: Mucous membranes are moist.      Pharynx: Oropharynx is clear.   Eyes:      Extraocular Movements: Extraocular movements intact.      Conjunctiva/sclera: Conjunctivae normal.      Pupils: Pupils are equal, round, and reactive to light.   Cardiovascular:      Rate and Rhythm: Regular rhythm. Bradycardia present.      Pulses: Normal pulses.      Heart sounds: Normal heart sounds. No murmur heard.    No gallop.   Pulmonary:      Effort: Pulmonary effort is normal.      Breath sounds: Normal breath sounds.   Abdominal:      General: Abdomen is flat. Bowel sounds are normal. There is no distension.      Palpations: Abdomen is soft. There is no mass.      Tenderness: There is no abdominal tenderness.   Musculoskeletal:         General: No swelling. Normal range of motion.      Cervical back: Normal range of motion and neck supple.   Skin:     General: Skin is warm and dry.      Findings: No rash.   Neurological:      General: No focal deficit present.      Mental " Status: He is alert and oriented to person, place, and time. Mental status is at baseline.   Psychiatric:         Mood and Affect: Mood normal.         Behavior: Behavior normal.           Current Outpatient Medications:   •  finasteride (PROSCAR) 5 MG tablet, Take 1 tablet by mouth Daily., Disp: 90 tablet, Rfl: 2  •  glucose blood test strip, Use as instructed, Disp: 360 each, Rfl: 12  •  lisinopril (PRINIVIL,ZESTRIL) 5 MG tablet, Take 1 tablet by mouth Daily., Disp: 90 tablet, Rfl: 2  •  metFORMIN ER (GLUCOPHAGE-XR) 750 MG 24 hr tablet, Take 1 tablet by mouth Daily With Breakfast., Disp: 90 tablet, Rfl: 2  •  simvastatin (ZOCOR) 20 MG tablet, Take 1 tablet by mouth Every Night., Disp: 90 tablet, Rfl: 2  •  terazosin (HYTRIN) 10 MG capsule, Take 1 capsule by mouth Every Night., Disp: 90 capsule, Rfl: 2  •  glycopyrrolate (ROBINUL) 1 MG tablet, Take 1 tablet by mouth 2 (Two) Times a Day., Disp: 60 tablet, Rfl: 3     Lab Results   Component Value Date    HGBA1C 6.5 (H) 07/08/2022        Health Maintenance   Topic Date Due   • DIABETIC EYE EXAM  04/23/2020   • ZOSTER VACCINE (3 of 3) 02/24/2022   • DIABETIC FOOT EXAM  07/02/2022   • INFLUENZA VACCINE  08/01/2022   • HEMOGLOBIN A1C  01/08/2023   • LIPID PANEL  07/08/2023   • URINE MICROALBUMIN  07/08/2023   • TDAP/TD VACCINES (2 - Td or Tdap) 01/14/2030        Immunization History   Administered Date(s) Administered   • Flu Vaccine Intradermal Quad 18-64YR 10/07/2016   • Flu Vaccine Quad PF >36MO 10/13/2017   • Fluzone High Dose =>65 Years ([employer] ONLY) 10/09/2015, 10/12/2018, 10/15/2019   • Fluzone High-Dose 65+yrs 10/06/2021   • Hepatitis B Vaccine Adult IM 07/02/2021, 09/02/2021, 01/06/2022   • Influenza, Unspecified 10/16/2020   • Pneumococcal Conjugate 13-Valent (PCV13) 10/13/2017   • Pneumococcal Polysaccharide (PPSV23) 04/05/2013   • Tdap 01/14/2020   • Zostavax 10/09/2014       Assessment & Plan   Diagnoses and all orders for this visit:    1. Type 2 diabetes  mellitus with hypoglycemia without coma, with long-term current use of insulin (HCC) (Primary)  -     Hemoglobin A1c  -     Fluzone High-Dose 65+yrs ([id number])  -     Pneumococcal Conjugate Vaccine 20-Valent (PCV20)  -     Microalbumin / Creatinine Urine Ratio - Urine, Clean Catch    2. Well adult exam  -     Comprehensive Metabolic Panel  -     Lipid Panel  -     Hemoglobin A1c  -     CBC & Differential  -     Fluzone High-Dose 65+yrs ([id number])  -     Pneumococcal Conjugate Vaccine 20-Valent (PCV20)    3. Essential hypertension  -     ECG 12 Lead    4. Encounter for immunization  -     Fluzone High-Dose 65+yrs ([id number])  -     Pneumococcal Conjugate Vaccine 20-Valent (PCV20)    - feet ok 10/18/22  - lipids on statin  - eyes check yearly  - goal a1c <7, check labs and adjust  - pcv20 and flu today    - htn, check labs, ekg, at goal today; noted bradycardia, has been evaluated, has been his baseline for years he states, no lightheadedness, no dizziness       Well adult exam  - labs checked and evaluated    Colonoscopy - due in 12/2022  Prostate - cousneled on screening  Glaucoma - yearly  AAA - na  Lung cancer - na  HIV - neg  HCV - neg  DM - checking  HLD - checking  Smoking - no  Depression - no, [id number]  Vaccines - flu and pcv20 today  Falls - no  Alcohol Screening - no    Discussed mental health, sexual health, substance use, abuse, anticipatory guidance given.      No follow-ups on file.     Moreno Martinez MD  [facility] [place]  Internal Medicine and Pediatrics  Phone: [phone]  Fax: [phone]

## 2022-10-19 LAB
ALBUMIN SERPL-MCNC: 5 G/DL (ref 3.5–5.2)
ALBUMIN/CREAT UR: 2 MG/G CREAT (ref 0–29)
ALBUMIN/GLOB SERPL: 2.6 G/DL
ALP SERPL-CCNC: 65 U/L (ref 39–117)
ALT SERPL-CCNC: 15 U/L (ref 1–41)
AST SERPL-CCNC: 25 U/L (ref 1–40)
BASOPHILS # BLD AUTO: 0.04 10*3/MM3 (ref 0–0.2)
BASOPHILS NFR BLD AUTO: 0.7 % (ref 0–1.5)
BILIRUB SERPL-MCNC: 0.7 MG/DL (ref 0–1.2)
BUN SERPL-MCNC: 22 MG/DL (ref 8–23)
BUN/CREAT SERPL: 19.6 (ref 7–25)
CALCIUM SERPL-MCNC: 9.6 MG/DL (ref 8.6–10.5)
CHLORIDE SERPL-SCNC: 105 MMOL/L (ref 98–107)
CHOLEST SERPL-MCNC: 137 MG/DL (ref 0–200)
CO2 SERPL-SCNC: 28.2 MMOL/L (ref 22–29)
CREAT SERPL-MCNC: 1.12 MG/DL (ref 0.76–1.27)
CREAT UR-MCNC: 180.3 MG/DL
EGFRCR SERPLBLD CKD-EPI 2021: 69.4 ML/MIN/1.73
EOSINOPHIL # BLD AUTO: 0.09 10*3/MM3 (ref 0–0.4)
EOSINOPHIL NFR BLD AUTO: 1.5 % (ref 0.3–6.2)
ERYTHROCYTE [DISTWIDTH] IN BLOOD BY AUTOMATED COUNT: 12 % (ref 12.3–15.4)
GLOBULIN SER CALC-MCNC: 1.9 GM/DL
GLUCOSE SERPL-MCNC: 128 MG/DL (ref 65–99)
HBA1C MFR BLD: 6.6 % (ref 4.8–5.6)
HCT VFR BLD AUTO: 40.8 % (ref 37.5–51)
HDLC SERPL-MCNC: 59 MG/DL (ref 40–60)
HGB BLD-MCNC: 13.5 G/DL (ref 13–17.7)
IMM GRANULOCYTES # BLD AUTO: 0.01 10*3/MM3 (ref 0–0.05)
IMM GRANULOCYTES NFR BLD AUTO: 0.2 % (ref 0–0.5)
LDLC SERPL CALC-MCNC: 65 MG/DL (ref 0–100)
LYMPHOCYTES # BLD AUTO: 2.91 10*3/MM3 (ref 0.7–3.1)
LYMPHOCYTES NFR BLD AUTO: 47.9 % (ref 19.6–45.3)
MCH RBC QN AUTO: 31.1 PG (ref 26.6–33)
MCHC RBC AUTO-ENTMCNC: 33.1 G/DL (ref 31.5–35.7)
MCV RBC AUTO: 94 FL (ref 79–97)
MICROALBUMIN UR-MCNC: 4.3 UG/ML
MONOCYTES # BLD AUTO: 0.4 10*3/MM3 (ref 0.1–0.9)
MONOCYTES NFR BLD AUTO: 6.6 % (ref 5–12)
NEUTROPHILS # BLD AUTO: 2.63 10*3/MM3 (ref 1.7–7)
NEUTROPHILS NFR BLD AUTO: 43.1 % (ref 42.7–76)
NRBC BLD AUTO-RTO: 0 /100 WBC (ref 0–0.2)
PLATELET # BLD AUTO: 147 10*3/MM3 (ref 140–450)
POTASSIUM SERPL-SCNC: 4.3 MMOL/L (ref 3.5–5.2)
PROT SERPL-MCNC: 6.9 G/DL (ref 6–8.5)
RBC # BLD AUTO: 4.34 10*6/MM3 (ref 4.14–5.8)
SODIUM SERPL-SCNC: 142 MMOL/L (ref 136–145)
TRIGL SERPL-MCNC: 65 MG/DL (ref 0–150)
VLDLC SERPL CALC-MCNC: 13 MG/DL (ref 5–40)
WBC # BLD AUTO: 6.08 10*3/MM3 (ref 3.4–10.8)

## 2022-10-25 PROCEDURE — 93000 ELECTROCARDIOGRAM COMPLETE: CPT | Performed by: INTERNAL MEDICINE

## 2022-12-02 ENCOUNTER — OFFICE VISIT (OUTPATIENT)
Dept: INTERNAL MEDICINE | Facility: CLINIC | Age: 72
End: 2022-12-02

## 2022-12-02 VITALS
DIASTOLIC BLOOD PRESSURE: 74 MMHG | HEIGHT: 73 IN | OXYGEN SATURATION: 97 % | HEART RATE: 50 BPM | WEIGHT: 187 LBS | BODY MASS INDEX: 24.78 KG/M2 | RESPIRATION RATE: 18 BRPM | SYSTOLIC BLOOD PRESSURE: 114 MMHG | TEMPERATURE: 98.4 F

## 2022-12-02 DIAGNOSIS — K21.9 LARYNGOPHARYNGEAL REFLUX (LPR): Primary | ICD-10-CM

## 2022-12-02 PROCEDURE — 99214 OFFICE O/P EST MOD 30 MIN: CPT | Performed by: INTERNAL MEDICINE

## 2022-12-02 RX ORDER — PANTOPRAZOLE SODIUM 40 MG/1
40 TABLET, DELAYED RELEASE ORAL DAILY
Qty: 90 TABLET | Refills: 2 | Status: SHIPPED | OUTPATIENT
Start: 2022-12-02 | End: 2023-01-19 | Stop reason: SDUPTHER

## 2022-12-02 NOTE — PROGRESS NOTES
"Chief Complaint  Allergies (Follow up after allergy testing /Referral to GI )    Subjective        Rupesh Fox presents to [facility]  History of Present Illness  Here with 1 year or so of nasal congestion, drainage; has been followed with ENT and allergy; recently told by allergy he could have LPR after having had negative allergy testing; no known reflux or burning sensation; he does note some food catching or sticking at times as well though this is rare      Objective   Vital Signs:  /74   Pulse 50   Temp 98.4 °F (36.9 °C)   Resp 18   Ht 185.4 cm (73\")   Wt 84.8 kg (187 lb)   SpO2 97%   BMI 24.67 kg/m²   Estimated body mass index is 24.67 kg/m² as calculated from the following:    Height as of this encounter: 185.4 cm (73\").    Weight as of this encounter: 84.8 kg (187 lb).    BMI is within normal parameters. No other follow-up for BMI required.      Physical Exam  Vitals and nursing note reviewed.   Constitutional:       General: He is not in acute distress.     Appearance: Normal appearance.   HENT:      Head: Normocephalic and atraumatic.      Right Ear: External ear normal.      Left Ear: External ear normal.      Nose: Nose normal.      Mouth/Throat:      Mouth: Mucous membranes are moist.      Pharynx: Oropharynx is clear.   Eyes:      Extraocular Movements: Extraocular movements intact.      Conjunctiva/sclera: Conjunctivae normal.      Pupils: Pupils are equal, round, and reactive to light.   Cardiovascular:      Rate and Rhythm: Normal rate and regular rhythm.      Pulses: Normal pulses.      Heart sounds: Normal heart sounds. No murmur heard.    No gallop.   Pulmonary:      Effort: Pulmonary effort is normal.      Breath sounds: Normal breath sounds.   Abdominal:      General: Abdomen is flat. Bowel sounds are normal. There is no distension.      Palpations: Abdomen is soft. There is no mass.      Tenderness: There is no abdominal " tenderness.   Musculoskeletal:         General: No swelling. Normal range of motion.      Cervical back: Normal range of motion and neck supple.   Skin:     General: Skin is warm and dry.      Findings: No rash.   Neurological:      General: No focal deficit present.      Mental Status: He is alert and oriented to person, place, and time. Mental status is at baseline.   Psychiatric:         Mood and Affect: Mood normal.         Behavior: Behavior normal.        Result Review :                Assessment and Plan   Diagnoses and all orders for this visit:    1. Laryngopharyngeal reflux (LPR) (Primary)    Other orders  -     pantoprazole (PROTONIX) 40 MG EC tablet; Take 1 tablet by mouth Daily.  Dispense: 90 tablet; Refill: 2    - counseled on supportive care, adjustment of diet, positional changes with sleep  - start ppi, counseled on bid for 1-2 weeks and then daily  - if no imrpovement consider another ppi and then possibly EGD/laryngoscopy pending above  - rtc to follow up 1 month or sooner as needed         Follow Up   Return in about 4 weeks (around 12/30/2022) for Recheck.  Patient was given instructions and counseling regarding his condition or for health maintenance advice. Please see specific information pulled into the AVS if appropriate.

## 2023-01-04 ENCOUNTER — OFFICE VISIT (OUTPATIENT)
Dept: INTERNAL MEDICINE | Facility: CLINIC | Age: 73
End: 2023-01-04
Payer: MEDICARE

## 2023-01-04 VITALS
DIASTOLIC BLOOD PRESSURE: 70 MMHG | RESPIRATION RATE: 18 BRPM | SYSTOLIC BLOOD PRESSURE: 116 MMHG | TEMPERATURE: 97.8 F | HEART RATE: 45 BPM | WEIGHT: 187 LBS | BODY MASS INDEX: 24.78 KG/M2 | OXYGEN SATURATION: 97 % | HEIGHT: 73 IN

## 2023-01-04 DIAGNOSIS — Z79.4 TYPE 2 DIABETES MELLITUS WITH HYPOGLYCEMIA WITHOUT COMA, WITH LONG-TERM CURRENT USE OF INSULIN: ICD-10-CM

## 2023-01-04 DIAGNOSIS — M25.562 CHRONIC PAIN OF LEFT KNEE: ICD-10-CM

## 2023-01-04 DIAGNOSIS — G89.29 CHRONIC PAIN OF LEFT KNEE: ICD-10-CM

## 2023-01-04 DIAGNOSIS — E11.649 TYPE 2 DIABETES MELLITUS WITH HYPOGLYCEMIA WITHOUT COMA, WITH LONG-TERM CURRENT USE OF INSULIN: ICD-10-CM

## 2023-01-04 DIAGNOSIS — K21.9 LARYNGOPHARYNGEAL REFLUX (LPR): Primary | ICD-10-CM

## 2023-01-04 PROCEDURE — 99214 OFFICE O/P EST MOD 30 MIN: CPT | Performed by: INTERNAL MEDICINE

## 2023-01-04 NOTE — PROGRESS NOTES
Chief Complaint  Nasal Congestion (1 month follow up ) and Pain (Left knee pain )    Subjective        Rupesh Fox presents to [facility]  History of Present Illness  Patient doing slightly better after trial of PPI, however still has feeling of food getting stuck, particularly bran cereal and lettuce. Liquids are okay. No heart burn, chest pain, SOB. Has trial various amount of things in the past.       Objective   Vital Signs:  /70   Pulse (!) 45   Temp 97.8 °F (36.6 °C)   Resp 18   Ht 185.4 cm (73\")   Wt 84.8 kg (187 lb)   SpO2 97%   BMI 24.67 kg/m²   Estimated body mass index is 24.67 kg/m² as calculated from the following:    Height as of this encounter: 185.4 cm (73\").    Weight as of this encounter: 84.8 kg (187 lb).    BMI is within normal parameters. No other follow-up for BMI required.      Physical Exam  Constitutional:       General: He is not in acute distress.     Appearance: Normal appearance.   HENT:      Head: Normocephalic and atraumatic.      Right Ear: External ear normal.      Left Ear: External ear normal.      Nose: Nose normal. No congestion.      Mouth/Throat:      Mouth: Mucous membranes are moist.      Pharynx: Oropharynx is clear.   Eyes:      Extraocular Movements: Extraocular movements intact.      Conjunctiva/sclera: Conjunctivae normal.      Pupils: Pupils are equal, round, and reactive to light.   Cardiovascular:      Rate and Rhythm: Normal rate and regular rhythm.   Pulmonary:      Effort: Pulmonary effort is normal. No respiratory distress.      Breath sounds: Normal breath sounds.   Abdominal:      General: Abdomen is flat.      Palpations: Abdomen is soft.      Tenderness: There is no abdominal tenderness.   Musculoskeletal:         General: No swelling or tenderness. Normal range of motion.      Cervical back: Normal range of motion and neck supple.   Skin:     General: Skin is warm and dry.      Capillary Refill:  Capillary refill takes less than 2 seconds.   Neurological:      General: No focal deficit present.      Mental Status: He is alert and oriented to person, place, and time. Mental status is at baseline.      Cranial Nerves: No cranial nerve deficit.   Psychiatric:         Mood and Affect: Mood normal.         Behavior: Behavior normal.        Result Review :                Assessment and Plan   Diagnoses and all orders for this visit:    1. Laryngopharyngeal reflux (LPR) (Primary)    2. Type 2 diabetes mellitus with hypoglycemia without coma, with long-term current use of insulin (HCC)  -     Comprehensive Metabolic Panel  -     Hemoglobin A1c  -     Lipid Panel With / Chol / HDL Ratio  -     Microalbumin / Creatinine Urine Ratio - Urine, Clean Catch    3. Chronic pain of left knee  -     Ambulatory Referral to Orthopedic Surgery    DDX are neuromuscular disorders, malignancy, primary esophageal disorders. Will cont trial of PPI. If not improvement, next step is scope to r/o any issues structurallly (stricture/web), ensure no present tumor. Will have him RTC in 4 weeks.            Follow Up   No follow-ups on file.  Patient was given instructions and counseling regarding his condition or for health maintenance advice. Please see specific information pulled into the AVS if appropriate.

## 2023-01-05 LAB
ALBUMIN SERPL-MCNC: 4.4 G/DL (ref 3.5–5.2)
ALBUMIN/CREAT UR: 3 MG/G CREAT (ref 0–29)
ALBUMIN/GLOB SERPL: 2.1 G/DL
ALP SERPL-CCNC: 72 U/L (ref 39–117)
ALT SERPL-CCNC: 16 U/L (ref 1–41)
AST SERPL-CCNC: 23 U/L (ref 1–40)
BILIRUB SERPL-MCNC: 0.6 MG/DL (ref 0–1.2)
BUN SERPL-MCNC: 20 MG/DL (ref 8–23)
BUN/CREAT SERPL: 16.7 (ref 7–25)
CALCIUM SERPL-MCNC: 9.4 MG/DL (ref 8.6–10.5)
CHLORIDE SERPL-SCNC: 105 MMOL/L (ref 98–107)
CHOLEST SERPL-MCNC: 136 MG/DL (ref 0–200)
CHOLEST/HDLC SERPL: 2.52 {RATIO}
CO2 SERPL-SCNC: 28.5 MMOL/L (ref 22–29)
CREAT SERPL-MCNC: 1.2 MG/DL (ref 0.76–1.27)
CREAT UR-MCNC: 143.7 MG/DL
EGFRCR SERPLBLD CKD-EPI 2021: 63.9 ML/MIN/1.73
GLOBULIN SER CALC-MCNC: 2.1 GM/DL
GLUCOSE SERPL-MCNC: 137 MG/DL (ref 65–99)
HBA1C MFR BLD: 6.7 % (ref 4.8–5.6)
HDLC SERPL-MCNC: 54 MG/DL (ref 40–60)
LDLC SERPL CALC-MCNC: 67 MG/DL (ref 0–100)
MICROALBUMIN UR-MCNC: 3.7 UG/ML
POTASSIUM SERPL-SCNC: 5.4 MMOL/L (ref 3.5–5.2)
PROT SERPL-MCNC: 6.5 G/DL (ref 6–8.5)
SODIUM SERPL-SCNC: 141 MMOL/L (ref 136–145)
TRIGL SERPL-MCNC: 76 MG/DL (ref 0–150)
VLDLC SERPL CALC-MCNC: 15 MG/DL (ref 5–40)

## 2023-01-12 LAB
BUN SERPL-MCNC: 20 MG/DL (ref 8–23)
BUN/CREAT SERPL: 17.4 (ref 7–25)
CALCIUM SERPL-MCNC: 9.5 MG/DL (ref 8.6–10.5)
CHLORIDE SERPL-SCNC: 105 MMOL/L (ref 98–107)
CO2 SERPL-SCNC: 27.5 MMOL/L (ref 22–29)
CREAT SERPL-MCNC: 1.15 MG/DL (ref 0.76–1.27)
EGFRCR SERPLBLD CKD-EPI 2021: 67.2 ML/MIN/1.73
GLUCOSE SERPL-MCNC: 140 MG/DL (ref 65–99)
POTASSIUM SERPL-SCNC: 4.7 MMOL/L (ref 3.5–5.2)
SODIUM SERPL-SCNC: 141 MMOL/L (ref 136–145)

## 2023-01-17 ENCOUNTER — PATIENT MESSAGE (OUTPATIENT)
Dept: INTERNAL MEDICINE | Facility: CLINIC | Age: 73
End: 2023-01-17
Payer: MEDICARE

## 2023-01-17 DIAGNOSIS — K21.9 LARYNGOPHARYNGEAL REFLUX (LPR): Primary | ICD-10-CM

## 2023-01-18 NOTE — TELEPHONE ENCOUNTER
From: Rupesh Fox  To: Moreno Martinez  Sent: 1/17/2023 10:02 AM EST  Subject: Referral for GI Doctor    Please provide a referral for a gastroenterologist. I have been taking pantoprazole for 6 weeks with no appreciable improvement in the nasal drainage or throat/esophagus irritation.

## 2023-01-19 ENCOUNTER — OFFICE VISIT (OUTPATIENT)
Dept: GASTROENTEROLOGY | Facility: CLINIC | Age: 73
End: 2023-01-19
Payer: MEDICARE

## 2023-01-19 VITALS
HEIGHT: 73 IN | BODY MASS INDEX: 25.53 KG/M2 | DIASTOLIC BLOOD PRESSURE: 64 MMHG | SYSTOLIC BLOOD PRESSURE: 114 MMHG | WEIGHT: 192.6 LBS

## 2023-01-19 DIAGNOSIS — K21.9 LARYNGOPHARYNGEAL REFLUX (LPR): Primary | ICD-10-CM

## 2023-01-19 PROCEDURE — 99214 OFFICE O/P EST MOD 30 MIN: CPT

## 2023-01-19 RX ORDER — PANTOPRAZOLE SODIUM 40 MG/1
40 TABLET, DELAYED RELEASE ORAL 2 TIMES DAILY
Qty: 180 TABLET | Refills: 2 | Status: SHIPPED | OUTPATIENT
Start: 2023-01-19 | End: 2023-01-19 | Stop reason: SDUPTHER

## 2023-01-19 RX ORDER — AZELASTINE 1 MG/ML
2 SPRAY, METERED NASAL 2 TIMES DAILY
COMMUNITY

## 2023-01-19 RX ORDER — PANTOPRAZOLE SODIUM 40 MG/1
40 TABLET, DELAYED RELEASE ORAL 2 TIMES DAILY
Qty: 180 TABLET | Refills: 2 | Status: SHIPPED | OUTPATIENT
Start: 2023-01-19

## 2023-01-19 NOTE — PROGRESS NOTES
PATIENT INFORMATION  Rupesh Fox       - 1950    CHIEF COMPLAINT  Chief Complaint   Patient presents with   • LPR   • Difficulty Swallowing       HISTORY OF PRESENT ILLNESS  Here today for throat discomfort    Started 14 most ago for post nasal drainage that did not respond to sudafed, flonase, constant, allergy testing was negative. Now has developed irritation in throat, felt at first was due to clearing throat. ENT diagnosed deviated septum and enlarged turbinate, surgery to correct in January, drainage not improved, over time worsening of throat discomfort. Still has food stick in throat, no specific food, has to overchew foods and eat very slow, bran cereal and salads can stick. A few times has had to cough food up, corn kernel or lettuce. Frequent clearing of throat. Some improvement with 2 months of PPI therapy, other than decrease in belching, until this weekend when throat became worse. Odynophagia with everything, even saliva, more dull. Can only recall nighttime acid reflux once. No nausea, vomiting, bloating. Would like EGD for reassurance because it has been going on for so long.    Last EGD  for sore throat and minor dysphagia, resolved prior to scope, egd with mod to severe gastritis, was not started on meds after that patient can recall.    Last colon 2017 normal with hemorrhoids. Believes he may have had a polyp once. Earliest would repeat .    CMP with elevated BG and decreased GFR, but otherwise normal, normal lipids, A1c <7, no anemia      REVIEWED PERTINENT RESULTS/ LABS  No results found for: CASEREPORT, FINALDX  Lab Results   Component Value Date    HGB 13.5 10/18/2022    MCV 94.0 10/18/2022     10/18/2022    ALT 16 2023    AST 23 2023    HGBA1C 6.70 (H) 2023    TRIG 76 2023      No results found.    REVIEW OF SYSTEMS  Review of Systems   Constitutional: Negative.    HENT: Positive for sore throat and trouble swallowing.    Eyes:  Negative.    Respiratory: Positive for cough.    Cardiovascular: Negative.    Gastrointestinal:        LPR   Endocrine: Negative.    Genitourinary: Negative.    Musculoskeletal: Negative.    Skin: Negative.    Allergic/Immunologic: Negative.    Neurological: Negative.    Hematological: Negative.    Psychiatric/Behavioral: Negative.          ACTIVE PROBLEMS  Patient Active Problem List    Diagnosis    • Acid reflux [K21.9]    • Can't get food down [[id number]]    • Gastrointestinal ulcer due to Helicobacter pylori [K28.9, B96.81]    • Hx of colonic polyps [Z86.010]          PAST MEDICAL HISTORY  Past Medical History:   Diagnosis Date   • Allergic rhinitis    • Benign prostatic hyperplasia without lower urinary tract symptoms    • Bradycardia, unspecified    • Cellulitis and abscess of toe    • Cervicalgia    • Colon polyp    • Deviated septum    • Diabetes mellitus (HCC)    • Diabetic eye exam (HCC)     5/14, 6/4/15 - negative, 6/16 - small change in vessel, 5/17 - negative, f/u 12 mo, 3/18 - negative, 4/19 - negative   • Dysphagia    • Enlarged prostate without lower urinary tract symptoms (luts)    • Essential (primary) hypertension    • Gluteal tendinitis, unspecified hip    • Goiter    • Hearing loss    • Hyperlipidemia, unspecified    • Pain in left knee    • Pain in right shoulder    • Problem with sexual function    • Pure hypercholesterolemia    • Sprain     Sprain of hip: Sprain of other specified sites of hip and thigh   • Tinnitus, unspecified ear    • Trochanteric bursitis, left hip    • Type 2 diabetes mellitus without complication (HCC)          SURGICAL HISTORY  Past Surgical History:   Procedure Laterality Date   • COLONOSCOPY     • COLONOSCOPY N/A 12/06/2017    Procedure: COLONOSCOPY;  Surgeon: Rosalia James MD;  Location: [facility];  Service:    • ENDOSCOPY  05/2015    GERD/dysphagia - erosive gastritis, normal esophagus   • FRACTURE SURGERY Left 1972    FX: Femur   • SEPTOPLASTY     • SINUS SURGERY    "  • TONSILLECTOMY           FAMILY HISTORY  Family History   Problem Relation Age of Onset   • Benign prostatic hyperplasia Father    • Colon cancer Neg Hx    • Colon polyps Neg Hx          SOCIAL HISTORY  Social History     Occupational History   • Not on file   Tobacco Use   • Smoking status: Former     Types: Cigarettes     Quit date:      Years since quittin.0   • Smokeless tobacco: Never   Vaping Use   • Vaping Use: Never used   Substance and Sexual Activity   • Alcohol use: Yes     Comment: social minimal   • Drug use: No   • Sexual activity: Defer         CURRENT MEDICATIONS    Current Outpatient Medications:   •  azelastine (ASTELIN) 0.1 % nasal spray, 2 sprays into the nostril(s) as directed by provider 2 (Two) Times a Day. Use in each nostril as directed, Disp: , Rfl:   •  finasteride (PROSCAR) 5 MG tablet, Take 1 tablet by mouth Daily., Disp: 90 tablet, Rfl: 2  •  glucose blood test strip, Use as instructed, Disp: 360 each, Rfl: 12  •  lisinopril (PRINIVIL,ZESTRIL) 5 MG tablet, Take 1 tablet by mouth Daily., Disp: 90 tablet, Rfl: 2  •  metFORMIN ER (GLUCOPHAGE-XR) 750 MG 24 hr tablet, Take 1 tablet by mouth Daily With Breakfast., Disp: 90 tablet, Rfl: 2  •  pantoprazole (PROTONIX) 40 MG EC tablet, Take 1 tablet by mouth 2 (Two) Times a Day., Disp: 180 tablet, Rfl: 2  •  simvastatin (ZOCOR) 20 MG tablet, Take 1 tablet by mouth Every Night., Disp: 90 tablet, Rfl: 2  •  terazosin (HYTRIN) 10 MG capsule, Take 1 capsule by mouth Every Night., Disp: 90 capsule, Rfl: 2    ALLERGIES  Patient has no known allergies.    VITALS  Vitals:    23 0816   BP: 114/64   BP Location: Left arm   Patient Position: Sitting   Cuff Size: Adult   Weight: 87.4 kg (192 lb 9.6 oz)   Height: 185.4 cm (72.99\")       PHYSICAL EXAM  Debilities/Disabilities Identified: None  Emotional Behavior: Appropriate  Wt Readings from Last 3 Encounters:   23 87.4 kg (192 lb 9.6 oz)   23 84.8 kg (187 lb)   22 84.8 kg " "(187 lb)     Ht Readings from Last 1 Encounters:   01/19/23 185.4 cm (72.99\")     Body mass index is 25.42 kg/m².  Physical Exam  Constitutional:       General: He is not in acute distress.     Appearance: Normal appearance. He is not ill-appearing.   HENT:      Head: Normocephalic and atraumatic.      Mouth/Throat:      Mouth: Mucous membranes are moist.      Pharynx: Posterior oropharyngeal erythema present. No oropharyngeal exudate.   Eyes:      General: No scleral icterus.  Cardiovascular:      Rate and Rhythm: Normal rate and regular rhythm.      Heart sounds: Normal heart sounds.   Pulmonary:      Effort: Pulmonary effort is normal.      Breath sounds: Normal breath sounds.   Abdominal:      General: Abdomen is flat. Bowel sounds are normal. There is no distension.      Palpations: Abdomen is soft. There is no mass.      Tenderness: There is no abdominal tenderness. There is no guarding or rebound. Negative signs include Pozo's sign.      Hernia: No hernia is present.   Musculoskeletal:      Cervical back: Neck supple.   Skin:     General: Skin is warm.      Capillary Refill: Capillary refill takes less than 2 seconds.   Neurological:      General: No focal deficit present.      Mental Status: He is alert and oriented to person, place, and time.   Psychiatric:         Mood and Affect: Mood normal.         Behavior: Behavior normal.         Thought Content: Thought content normal.         Judgment: Judgment normal.         CLINICAL DATA REVIEWED   reviewed previous lab results and integrated with today's visit, reviewed notes from other physicians and/or last GI encounter, reviewed previous endoscopy results and available photos, reviewed surgical pathology results from previous biopsies    ASSESSMENT  Diagnoses and all orders for this visit:    Laryngopharyngeal reflux (LPR)  -     Discontinue: pantoprazole (PROTONIX) 40 MG EC tablet; Take 1 tablet by mouth 2 (Two) Times a Day.  -     Case Request; " Standing  -     Case Request  -     pantoprazole (PROTONIX) 40 MG EC tablet; Take 1 tablet by mouth 2 (Two) Times a Day.    Other orders  -     azelastine (ASTELIN) 0.1 % nasal spray; 2 sprays into the nostril(s) as directed by provider 2 (Two) Times a Day. Use in each nostril as directed  -     Follow Anesthesia Guidelines / Protocol; Future  -     Obtain Informed Consent; Standing          PLAN     Increase PPI to BID  Go to PCP if sore throat worsening or unrelieved, feel this is separate and acute    Return in about 3 months (around 4/19/2023).    I have discussed the above plan with the patient.  They verbalize understanding and are in agreement with the plan.  They have been advised to contact the office for any questions, concerns, or changes related to their health.

## 2023-02-02 ENCOUNTER — OFFICE VISIT (OUTPATIENT)
Dept: ORTHOPEDIC SURGERY | Facility: CLINIC | Age: 73
End: 2023-02-02
Payer: MEDICARE

## 2023-02-02 VITALS
HEART RATE: 55 BPM | DIASTOLIC BLOOD PRESSURE: 72 MMHG | HEIGHT: 73 IN | WEIGHT: 192 LBS | SYSTOLIC BLOOD PRESSURE: 115 MMHG | BODY MASS INDEX: 25.45 KG/M2

## 2023-02-02 DIAGNOSIS — M25.561 RIGHT KNEE PAIN, UNSPECIFIED CHRONICITY: ICD-10-CM

## 2023-02-02 DIAGNOSIS — M17.11 PRIMARY OSTEOARTHRITIS OF RIGHT KNEE: ICD-10-CM

## 2023-02-02 DIAGNOSIS — M17.12 PRIMARY OSTEOARTHRITIS OF LEFT KNEE: ICD-10-CM

## 2023-02-02 DIAGNOSIS — M25.562 LEFT KNEE PAIN, UNSPECIFIED CHRONICITY: Primary | ICD-10-CM

## 2023-02-02 PROCEDURE — 73562 X-RAY EXAM OF KNEE 3: CPT | Performed by: ORTHOPAEDIC SURGERY

## 2023-02-02 PROCEDURE — 99203 OFFICE O/P NEW LOW 30 MIN: CPT | Performed by: ORTHOPAEDIC SURGERY

## 2023-02-02 PROCEDURE — 20610 DRAIN/INJ JOINT/BURSA W/O US: CPT | Performed by: ORTHOPAEDIC SURGERY

## 2023-02-02 RX ORDER — GLUCOSAM/MSM/CHOND/HYALURON AC 750-60-150
TABLET ORAL 2 TIMES DAILY
COMMUNITY

## 2023-02-02 RX ADMIN — TRIAMCINOLONE ACETONIDE 80 MG: 40 INJECTION, SUSPENSION INTRA-ARTICULAR; INTRAMUSCULAR at 09:29

## 2023-02-02 RX ADMIN — LIDOCAINE HYDROCHLORIDE 8 ML: 10 INJECTION, SOLUTION EPIDURAL; INFILTRATION; INTRACAUDAL; PERINEURAL at 09:29

## 2023-02-02 NOTE — PROGRESS NOTES
Subjective:     Patient ID: Rupesh Fox is a 72 y.o. male.    Chief Complaint:  Bilateral knee pain, new patient  History of Present Illness  Rupesh Fox presents to the clinic for evaluation of bilateral knee pain, left significantly worse than his right. He does have a significant history of prior left femur fracture. His pain has been progressively worse over the last 5 years, but particularly over the last 1 to 2 years, it has limited him now from walking. He rates his pain as a 5 to 6 out of 10 when walking, 2 to 3 out of 10 at rest. It is aching in nature. He does note some radiation of pain down into the anteromedial aspect of his left tibia. He has minimal pain on the right side. Mild effusion as well as associated stiffness. It does wax and wane. He denies any fevers, chills, or sweats. He denies any new numbness or tingling at this point in time. He has not had previous injections. He has had minimal improvement with activity modification, moderate improvement with physical therapy to work on strengthening of his left side. He has noticed some occasional giving way episodes. He has noticed some bilateral hip pain as well, but it has improved significantly with his physical therapy.     Social History     Occupational History   • Not on file   Tobacco Use   • Smoking status: Former     Types: Cigarettes     Quit date:      Years since quittin.1   • Smokeless tobacco: Never   Vaping Use   • Vaping Use: Never used   Substance and Sexual Activity   • Alcohol use: Yes     Comment: social minimal   • Drug use: No   • Sexual activity: Defer      Past Medical History:   Diagnosis Date   • Allergic rhinitis    • Benign prostatic hyperplasia without lower urinary tract symptoms    • Bradycardia, unspecified    • Cellulitis and abscess of toe    • Cervicalgia    • Colon polyp    • Deviated septum    • Diabetes mellitus (HCC)    • Diabetic eye exam (HCC)     , 6/4/15 - negative,  - small change  "in vessel, 5/17 - negative, f/u 12 mo, 3/18 - negative, 4/19 - negative   • Dysphagia    • Enlarged prostate without lower urinary tract symptoms (luts)    • Essential (primary) hypertension    • Gluteal tendinitis, unspecified hip    • Goiter    • Hearing loss    • Hyperlipidemia, unspecified    • Pain in left knee    • Pain in right shoulder    • Problem with sexual function    • Pure hypercholesterolemia    • Sprain     Sprain of hip: Sprain of other specified sites of hip and thigh   • Tinnitus, unspecified ear    • Trochanteric bursitis, left hip    • Type 2 diabetes mellitus without complication (HCC)      Past Surgical History:   Procedure Laterality Date   • COLONOSCOPY     • COLONOSCOPY N/A 12/06/2017    Procedure: COLONOSCOPY;  Surgeon: Rosalia James MD;  Location: [facility] OR;  Service:    • ENDOSCOPY  05/2015    GERD/dysphagia - erosive gastritis, normal esophagus   • ENDOSCOPY N/A 2/7/2023    Procedure: ESOPHAGOGASTRODUODENOSCOPY;  Surgeon: Lico Mcmillan MD;  Location: [facility] OR;  Service: Gastroenterology;  Laterality: N/A;  Esophagitis and Gastritis   • FRACTURE SURGERY Left 1972    FX: Femur   • SEPTOPLASTY     • SINUS SURGERY     • TONSILLECTOMY         Family History   Problem Relation Age of Onset   • Benign prostatic hyperplasia Father    • Colon cancer Neg Hx    • Colon polyps Neg Hx          Review of Systems        Objective:  Vitals:    02/02/23 0822   BP: 115/72   Pulse: 55   Weight: 87.1 kg (192 lb)   Height: 185.4 cm (72.99\")         02/02/23 0822   Weight: 87.1 kg (192 lb)     Body mass index is 25.34 kg/m².  Physical Exam    Vital signs reviewed.   General: No acute distress, alert and oriented  Eyes: conjunctiva clear; pupils equally round and reactive  ENT: external ears and nose atraumatic; oropharynx clear  CV: no peripheral edema  Resp: normal respiratory effort  Skin: no rashes or wounds; normal turgor  Psych: mood and affect appropriate; recent and remote memory " intact          Ortho Exam       Bilateral Knees :     Range of motion is 0 to 130 degrees.   Flexion strength- 4+/5.   Extension strength- 4+/5.   Maximal tenderness to palpation medial joint line.   Maurice's exam- Positive with pain along the medial joint line, mild click.   Effusion- Mild on the left, minimal on the right.   Lachman's test- Grade 1A.   Anterior drawer- Negative.   Posterior drawer- Negative.   Stable opening on varus and valgus stress at 0 and 30 degrees.   Log roll test- No hip pain.   Stinchfield's test- No hip pain.   Straight leg raise- Negative bilateral lower extremities.   Positive sensation light touch all distributions symmetric to contralateral side.   Brisk cap refill all digits, 2+ dorsalis pedis pulse.     Imaging:  Bilateral Knee X-Ray  Indication: Pain    AP, Lateral, and Catlin views    Findings:  No fracture  No bony lesion  Normal soft tissues  Advanced medial compartment joint space narrowing on the left knee, mild joint space narrowing on right knee, slight varus alignment bilaterally.  Mild reactive osteophyte formation along medial compartment of left knee.    No prior studies were available for comparison.    Assessment:        1. Left knee pain, unspecified chronicity    2. Right knee pain, unspecified chronicity    3. Primary osteoarthritis of right knee    4. Primary osteoarthritis of left knee           Plan:    Large Joint Arthrocentesis: L knee  Date/Time: 2/2/2023 9:29 AM  Consent given by: patient  Site marked: site marked  Timeout: Immediately prior to procedure a time out was called to verify the correct patient, procedure, equipment, support staff and site/side marked as required   Supporting Documentation  Indications: pain   Procedure Details  Location: knee - L knee  Preparation: Patient was prepped and draped in the usual sterile fashion  Needle size: 22 G  Approach: anterolateral  Medications administered: 80 mg triamcinolone acetonide 40 MG/ML; 8 mL  lidocaine PF 1% 1 %  Patient tolerance: patient tolerated the procedure well with no immediate complications                1. I discussed treatment options at length with patient at today's visit.  2. Given his persistent pain and limitations with activity to his bilateral knees, especially his left knee, we discussed options. He would like to proceed with left knee intra-articular injection on 02/02/2023.   3. Patient would like to proceed with cortisone injection today to the left knee. Recommended limited use of affected extremity for the next 24 hours to only essential activites other than work on general active and passive motion. Recommended supplementing with ice and soft tissue massage. Discussed with patient that they should see results in 5-7 days, if no improvement in 5-6 weeks I have asked them to call the office to review other options. Patient should call office immediately if they notice redness, warmth, fevers, chills, or residual numbness or tingling for greater than 6 hours after injection.   4. He is to continue with home exercises to work on range of motion and strength as tolerated.  5. He will follow up in 3 months or sooner if needed.        Rupesh Fox was in agreement with plan and had all questions answered.     Orders:  Orders Placed This Encounter   Procedures   • Large Joint Arthrocentesis: L knee   • XR Knee 3 View Bilateral       Medications:  No orders of the defined types were placed in this encounter.      Followup:  Return in about 3 months (around 5/2/2023).    Diagnoses and all orders for this visit:    1. Left knee pain, unspecified chronicity (Primary)  -     Cancel: XR Knee 3+ View With Sunrise Left  -     XR Knee 3 View Bilateral    2. Right knee pain, unspecified chronicity  -     XR Knee 3 View Bilateral    3. Primary osteoarthritis of right knee    4. Primary osteoarthritis of left knee    Other orders  -     Large Joint Arthrocentesis: L knee          Transcribed from  ambient dictation for Sergo Agustin MD by Lou Hernandez.  02/02/23   10:05 EST

## 2023-02-03 NOTE — SIGNIFICANT NOTE
Education provided the Patient on the following:    - Nothing to Eat or Drink after MN the night before the procedure  -You will need to have someone drive you home after your EGD and remain with you for 24 hours after the EGD  - The date of your EGD, your are welcome to have one visitor at bedside or remain within 10-15 minutes of [place]  -Please wear warm socks when you arrive for your EGD  -Remove all jewelry and leave any valuables before arriving on the date of your procedure (all will have to be removed before leaving preop)  -You will need to arrive at 1200 on 2-7-23 EGD  -Feel free to contact us at: [phone] with any additional questions/concerns

## 2023-02-07 ENCOUNTER — HOSPITAL ENCOUNTER (OUTPATIENT)
Facility: SURGERY CENTER | Age: 73
Setting detail: HOSPITAL OUTPATIENT SURGERY
Discharge: HOME | End: 2023-02-07
Attending: INTERNAL MEDICINE | Admitting: INTERNAL MEDICINE
Payer: MEDICARE

## 2023-02-07 ENCOUNTER — ANESTHESIA (OUTPATIENT)
Dept: SURGERY | Facility: SURGERY CENTER | Age: 73
End: 2023-02-07
Payer: MEDICARE

## 2023-02-07 ENCOUNTER — ANESTHESIA EVENT (OUTPATIENT)
Dept: SURGERY | Facility: SURGERY CENTER | Age: 73
End: 2023-02-07
Payer: MEDICARE

## 2023-02-07 VITALS
WEIGHT: 184.4 LBS | OXYGEN SATURATION: 96 % | BODY MASS INDEX: 24.44 KG/M2 | HEIGHT: 73 IN | HEART RATE: 47 BPM | SYSTOLIC BLOOD PRESSURE: 116 MMHG | TEMPERATURE: 98 F | DIASTOLIC BLOOD PRESSURE: 76 MMHG | RESPIRATION RATE: 16 BRPM

## 2023-02-07 DIAGNOSIS — K21.9 LARYNGOPHARYNGEAL REFLUX (LPR): ICD-10-CM

## 2023-02-07 PROCEDURE — 88305 TISSUE EXAM BY PATHOLOGIST: CPT | Performed by: INTERNAL MEDICINE

## 2023-02-07 PROCEDURE — 25010000002 PROPOFOL 1000 MG/100ML EMULSION: Performed by: ANESTHESIOLOGY

## 2023-02-07 PROCEDURE — 43239 EGD BIOPSY SINGLE/MULTIPLE: CPT | Performed by: INTERNAL MEDICINE

## 2023-02-07 PROCEDURE — 25010000002 PROPOFOL 10 MG/ML EMULSION: Performed by: ANESTHESIOLOGY

## 2023-02-07 RX ORDER — LIDOCAINE HYDROCHLORIDE 10 MG/ML
0.5 INJECTION, SOLUTION INFILTRATION; PERINEURAL ONCE AS NEEDED
Status: DISCONTINUED | OUTPATIENT
Start: 2023-02-07 | End: 2023-02-07 | Stop reason: HOSPADM

## 2023-02-07 RX ORDER — PROPOFOL 10 MG/ML
INJECTION, EMULSION INTRAVENOUS AS NEEDED
Status: DISCONTINUED | OUTPATIENT
Start: 2023-02-07 | End: 2023-02-07 | Stop reason: SURG

## 2023-02-07 RX ORDER — SODIUM CHLORIDE, SODIUM LACTATE, POTASSIUM CHLORIDE, CALCIUM CHLORIDE 600; 310; 30; 20 MG/100ML; MG/100ML; MG/100ML; MG/100ML
1000 INJECTION, SOLUTION INTRAVENOUS CONTINUOUS
Status: DISCONTINUED | OUTPATIENT
Start: 2023-02-07 | End: 2023-02-07 | Stop reason: HOSPADM

## 2023-02-07 RX ORDER — SODIUM CHLORIDE 0.9 % (FLUSH) 0.9 %
10 SYRINGE (ML) INJECTION AS NEEDED
Status: DISCONTINUED | OUTPATIENT
Start: 2023-02-07 | End: 2023-02-07 | Stop reason: HOSPADM

## 2023-02-07 RX ORDER — LIDOCAINE HYDROCHLORIDE 20 MG/ML
INJECTION, SOLUTION INFILTRATION; PERINEURAL AS NEEDED
Status: DISCONTINUED | OUTPATIENT
Start: 2023-02-07 | End: 2023-02-07 | Stop reason: SURG

## 2023-02-07 RX ADMIN — PROPOFOL 130 MG: 10 INJECTION, EMULSION INTRAVENOUS at 13:58

## 2023-02-07 RX ADMIN — PROPOFOL 200 MCG/KG/MIN: 10 INJECTION, EMULSION INTRAVENOUS at 13:58

## 2023-02-07 RX ADMIN — LIDOCAINE HYDROCHLORIDE 60 MG: 20 INJECTION, SOLUTION INFILTRATION; PERINEURAL at 13:58

## 2023-02-07 RX ADMIN — SODIUM CHLORIDE, POTASSIUM CHLORIDE, SODIUM LACTATE AND CALCIUM CHLORIDE 1000 ML: 600; 310; 30; 20 INJECTION, SOLUTION INTRAVENOUS at 12:28

## 2023-02-07 NOTE — INTERVAL H&P NOTE
"Vital Signs  /72 (BP Location: Left arm, Patient Position: Lying)   Pulse 67   Temp 97.5 °F (36.4 °C) (Infrared)   Resp 18   Ht 185.4 cm (73\")   Wt 83.6 kg (184 lb 6.4 oz)   SpO2 98%   BMI 24.33 kg/m²     H&P reviewed. The patient was examined and there are no changes to the H&P.        "

## 2023-02-07 NOTE — ANESTHESIA POSTPROCEDURE EVALUATION
"Patient: Rupesh Fox    Procedure Summary     Date: 02/07/23 Room / Location: [facility]    Anesthesia Start: 1351 Anesthesia Stop: 1413    Procedure: ESOPHAGOGASTRODUODENOSCOPY (Esophagus) Diagnosis:       Laryngopharyngeal reflux (LPR)      Reflux esophagitis      Gastritis      (Laryngopharyngeal reflux (LPR) [K21.9])    Surgeons: Lico Mcmillan MD Provider: Bob Hamilton DO    Anesthesia Type: MAC ASA Status: 2          Anesthesia Type: MAC    Vitals  Vitals Value Taken Time   /64 02/07/23 1424   Temp 36.7 °C (98 °F) 02/07/23 1411   Pulse 37 02/07/23 1424   Resp 18 02/07/23 1411   SpO2 99 % 02/07/23 1424   Vitals shown include unvalidated device data.        Post Anesthesia Care and Evaluation    Patient location during evaluation: bedside  Patient participation: waiting for patient participation  Level of consciousness: sleepy but conscious and responsive to verbal stimuli  Pain management: adequate    Airway patency: patent  Anesthetic complications: No anesthetic complications  PONV Status: NA  Cardiovascular status: acceptable and hemodynamically stable  Respiratory status: acceptable, spontaneous ventilation and nonlabored ventilation  Hydration status: acceptable    Comments: /64   Pulse (!) 37   Temp 36.7 °C (98 °F) (Infrared)   Resp 18   Ht 185.4 cm (73\")   Wt 83.6 kg (184 lb 6.4 oz)   SpO2 99%   BMI 24.33 kg/m²       "

## 2023-02-07 NOTE — ANESTHESIA PREPROCEDURE EVALUATION
Anesthesia Evaluation     Patient summary reviewed   no history of anesthetic complications:  NPO Solid Status: > 8 hours  NPO Liquid Status: > 2 hours           Airway   Mallampati: II  TM distance: >3 FB  Neck ROM: full  Narrow palate and Possible difficult intubation  Dental      Pulmonary     breath sounds clear to auscultation  (+) a smoker Former,   (-) shortness of breath, recent URISleep apnea: snores.  Cardiovascular   Exercise tolerance: good (4-7 METS)    Rhythm: regular    (+) hypertension, dysrhythmias (rate runs in the 40's) Bradycardia, hyperlipidemia,   (-) past MI, angina      Neuro/Psych  (-) seizures, CVA  GI/Hepatic/Renal/Endo    (+)  GERD,  diabetes mellitus type 2 well controlled,   (-)  obesity, no renal disease    ROS Comment: dysphagia    Musculoskeletal     (+) neck pain,   (-) neck stiffnessBack pain: occ.  Abdominal    Substance History   Alcohol use: social-occ.     OB/GYN          Other                          Anesthesia Plan    ASA 2     MAC     (MAC anesthesia discussed with patient and/or patient representative. Risks (including but not limited to intra-op awareness), benefits, and alternatives were discussed. Understanding was voiced with an agreement to proceed with a MAC technique and General as a backup option. )    Anesthetic plan, risks, benefits, and alternatives have been provided, discussed and informed consent has been obtained with: patient.

## 2023-02-07 NOTE — BRIEF OP NOTE
ESOPHAGOGASTRODUODENOSCOPY  Progress Note    Rupesh Fox  2/7/2023    Pre-op Diagnosis:   Laryngopharyngeal reflux (LPR) [K21.9]       Post-Op Diagnosis Codes:     * Laryngopharyngeal reflux (LPR) [K21.9]     * Reflux esophagitis [K21.00]     * Gastritis [K29.70]    Procedure/CPT® Codes:        Procedure(s):  ESOPHAGOGASTRODUODENOSCOPY        Surgeon(s):  Lico Mcmillan MD    Anesthesia: Monitored Anesthesia Care    Staff:   Endo Technician: Devora Stewart, RN  Endo Nurse: Margo Swenson RN         Estimated Blood Loss: none    Urine Voided: * No values recorded between 2/7/2023  1:51 PM and 2/7/2023  2:07 PM *    Specimens:                Specimens     ID Source Type Tests Collected By Collected At Frozen?    A Gastric, Antrum Tissue · TISSUE PATHOLOGY EXAM   Lico Mcmillan MD 2/7/23 1403 No    Description: Gastric biopsy     B Esophagus, Distal Tissue · TISSUE PATHOLOGY EXAM   Lico Mcmillan MD 2/7/23 1406 No    Description: distal Esophagus biopsy                Drains: * No LDAs found *    Findings: Normal Duodenum  Mild Gastritis-biopsy  Reflux Esophagitis-Biopsy        Complications: None          Lico Mcmillan MD     Date: 2/7/2023  Time: 14:09 EST

## 2023-02-09 LAB
LAB AP CASE REPORT: NORMAL
PATH REPORT.FINAL DX SPEC: NORMAL
PATH REPORT.GROSS SPEC: NORMAL

## 2023-02-19 RX ORDER — TRIAMCINOLONE ACETONIDE 40 MG/ML
80 INJECTION, SUSPENSION INTRA-ARTICULAR; INTRAMUSCULAR
Status: COMPLETED | OUTPATIENT
Start: 2023-02-02 | End: 2023-02-02

## 2023-02-19 RX ORDER — LIDOCAINE HYDROCHLORIDE 10 MG/ML
8 INJECTION, SOLUTION EPIDURAL; INFILTRATION; INTRACAUDAL; PERINEURAL
Status: COMPLETED | OUTPATIENT
Start: 2023-02-02 | End: 2023-02-02

## 2023-03-07 ENCOUNTER — OFFICE VISIT (OUTPATIENT)
Dept: GASTROENTEROLOGY | Facility: CLINIC | Age: 73
End: 2023-03-07
Payer: MEDICARE

## 2023-03-07 VITALS
HEIGHT: 73 IN | DIASTOLIC BLOOD PRESSURE: 64 MMHG | SYSTOLIC BLOOD PRESSURE: 114 MMHG | WEIGHT: 183.8 LBS | BODY MASS INDEX: 24.36 KG/M2

## 2023-03-07 DIAGNOSIS — K21.00 GASTROESOPHAGEAL REFLUX DISEASE WITH ESOPHAGITIS WITHOUT HEMORRHAGE: Primary | ICD-10-CM

## 2023-03-07 DIAGNOSIS — K22.70 BARRETT'S ESOPHAGUS WITHOUT DYSPLASIA: ICD-10-CM

## 2023-03-07 PROCEDURE — 99213 OFFICE O/P EST LOW 20 MIN: CPT

## 2023-03-07 NOTE — PROGRESS NOTES
PATIENT INFORMATION  Rupesh Fox       - 1950    CHIEF COMPLAINT  Chief Complaint   Patient presents with   • Heartburn   • Barretts Esophagus       HISTORY OF PRESENT ILLNESS  Here today for EGD follow-up    2023 for reflux esophagitis, positive for gastritis, reflux with barretts, negative for HP, dysplasia.  Reviewed images and path.    Per LOV: Still has food stick in throat, no specific food, has to overchew foods and eat very slow, bran cereal and salads can stick. A few times has had to cough food up, corn kernel or lettuce. Frequent clearing of throat. Some improvement with 2 months of PPI therapy, other than decrease in belching, until this weekend when throat became worse. Odynophagia with everything, even saliva, more dull. Can only recall nighttime acid reflux once. No nausea, vomiting, bloating. We increased PPI to BID. Today food sticking in throat much less, no episodes in 2 weeks, congestion much improved.    Last colon 2017 normal with hemorrhoids. Believes he may have had a polyp once. Earliest would repeat , but should be good until .    REVIEWED PERTINENT RESULTS/ LABS  Lab Results   Component Value Date    CASEREPORT  2023     Surgical Pathology Report                         Case: [id number]                                  Authorizing Provider:  Lico Mcmillan        Collected:           2023 02:03 PM                                 MD Saqib                                                                   Ordering Location:     [facility]     Received:            2023 02:36 PM                                 [facility]                                                     Pathologist:           Gallo Zhang MD                                                         Specimens:   1) - Gastric, Antrum, Gastric biopsy                                                                2) - Esophagus, Distal, distal  Esophagus biopsy                                            FINALDX  02/07/2023     1. Stomach, Biopsy: Benign gastric mucosa with    A. Mild chronic inflammation.   B. Intestinal metaplasia with no dysplasia.   C. No Helicobacter pylori.    D. Reactive changes of the epithelium.    2. Esophagus, Distal, Biopsy: Benign squamous and gastric mucosa with   A. Intestinal metaplasia consistent with Ann's esophagus with reactive changes and no dysplasia.   B. Extensive chronic inflammation of the gastric mucosa.   C. No Helicobacter pylori.    jose a/pkm        Lab Results   Component Value Date    HGB 13.5 10/18/2022    MCV 94.0 10/18/2022     10/18/2022    ALT 16 01/04/2023    AST 23 01/04/2023    HGBA1C 6.70 (H) 01/04/2023    TRIG 76 01/04/2023      No results found.    REVIEW OF SYSTEMS  Review of Systems   Constitutional: Negative.    HENT: Positive for congestion, trouble swallowing and voice change.    Eyes: Negative.    Respiratory: Negative.    Cardiovascular: Negative.    Gastrointestinal: Negative for abdominal distention, abdominal pain, constipation, diarrhea, nausea and vomiting.        GERD, Barretts   Endocrine: Negative.    Genitourinary: Negative.    Musculoskeletal: Negative.    Skin: Negative.    Allergic/Immunologic: Negative.    Neurological: Negative.    Hematological: Negative.    Psychiatric/Behavioral: Negative.          ACTIVE PROBLEMS  Patient Active Problem List    Diagnosis    • Gastroesophageal reflux disease with esophagitis without hemorrhage [K21.00]    • Ann's esophagus without dysplasia [K22.70]    • Acid reflux [K21.9]    • Can't get food down [[id number]]    • Gastrointestinal ulcer due to Helicobacter pylori [K28.9, B96.81]    • Hx of colonic polyps [Z86.010]          PAST MEDICAL HISTORY  Past Medical History:   Diagnosis Date   • Allergic rhinitis    • Benign prostatic hyperplasia without lower urinary tract symptoms    • Bradycardia, unspecified    • Cellulitis and  abscess of toe    • Cervicalgia    • Colon polyp    • Deviated septum    • Diabetes mellitus (HCC)    • Diabetic eye exam ([facility])     , /4/15 - negative,  - small change in vessel,  - negative, f/u 12 mo, 3/18 - negative,  - negative   • Dysphagia    • Enlarged prostate without lower urinary tract symptoms (luts)    • Essential (primary) hypertension    • Gluteal tendinitis, unspecified hip    • Goiter    • Hearing loss    • Hyperlipidemia, unspecified    • Pain in left knee    • Pain in right shoulder    • Problem with sexual function    • Pure hypercholesterolemia    • Sprain     Sprain of hip: Sprain of other specified sites of hip and thigh   • Tinnitus, unspecified ear    • Trochanteric bursitis, left hip    • Type 2 diabetes mellitus without complication ([facility])          SURGICAL HISTORY  Past Surgical History:   Procedure Laterality Date   • COLONOSCOPY     • COLONOSCOPY N/A 2017    Procedure: COLONOSCOPY;  Surgeon: Rosalia James MD;  Location: [facility] OR;  Service:    • ENDOSCOPY  2015    GERD/dysphagia - erosive gastritis, normal esophagus   • ENDOSCOPY N/A 2023    Procedure: ESOPHAGOGASTRODUODENOSCOPY;  Surgeon: Lico Mcmillan MD;  [place] [facility] [place];  Service: Gastroenterology;  Laterality: N/A;  Esophagitis and Gastritis   • FRACTURE SURGERY Left     FX: Femur   • SEPTOPLASTY     • SINUS SURGERY     • TONSILLECTOMY           FAMILY HISTORY  Family History   Problem Relation Age of Onset   • Benign prostatic hyperplasia Father    • Colon cancer Neg Hx    • Colon polyps Neg Hx          SOCIAL HISTORY  Social History     Occupational History   • Not on file   Tobacco Use   • Smoking status: Former     Types: Cigarettes     Quit date:      Years since quittin.2   • Smokeless tobacco: Never   Vaping Use   • Vaping Use: Never used   Substance and Sexual Activity   • Alcohol use: Yes     Comment: social minimal   • Drug use: No   • Sexual activity: Defer  "        CURRENT MEDICATIONS    Current Outpatient Medications:   •  azelastine (ASTELIN) 0.1 % nasal spray, 2 sprays into the nostril(s) as directed by provider 2 (Two) Times a Day. Use in each nostril as directed, Disp: , Rfl:   •  finasteride (PROSCAR) 5 MG tablet, Take 1 tablet by mouth Daily., Disp: 90 tablet, Rfl: 2  •  Glucos-Chondroit-Hyaluron-MSM (Glucosamine Chondroitin Joint) tablet, Take  by mouth 2 (Two) Times a Day., Disp: , Rfl:   •  glucose blood test strip, Use as instructed, Disp: 360 each, Rfl: 12  •  lisinopril (PRINIVIL,ZESTRIL) 5 MG tablet, Take 1 tablet by mouth Daily., Disp: 90 tablet, Rfl: 2  •  metFORMIN ER (GLUCOPHAGE-XR) 750 MG 24 hr tablet, Take 1 tablet by mouth Daily With Breakfast., Disp: 90 tablet, Rfl: 2  •  pantoprazole (PROTONIX) 40 MG EC tablet, Take 1 tablet by mouth 2 (Two) Times a Day., Disp: 180 tablet, Rfl: 2  •  simvastatin (ZOCOR) 20 MG tablet, Take 1 tablet by mouth Every Night., Disp: 90 tablet, Rfl: 2  •  terazosin (HYTRIN) 10 MG capsule, Take 1 capsule by mouth Every Night., Disp: 90 capsule, Rfl: 2    ALLERGIES  Patient has no known allergies.    VITALS  Vitals:    03/07/23 1020   BP: 114/64   BP Location: Left arm   Patient Position: Sitting   Cuff Size: Adult   Weight: 83.4 kg (183 lb 12.8 oz)   Height: 185.4 cm (72.99\")       PHYSICAL EXAM  Debilities/Disabilities Identified: None  Emotional Behavior: Appropriate  Wt Readings from Last 3 Encounters:   03/07/23 83.4 kg (183 lb 12.8 oz)   02/07/23 83.6 kg (184 lb 6.4 oz)   02/02/23 87.1 kg (192 lb)     Ht Readings from Last 1 Encounters:   03/07/23 185.4 cm (72.99\")     Body mass index is 24.25 kg/m².  Physical Exam  Constitutional:       General: He is not in acute distress.     Appearance: Normal appearance. He is not ill-appearing.   HENT:      Head: Normocephalic and atraumatic.      Mouth/Throat:      Mouth: Mucous membranes are moist.      Pharynx: No posterior oropharyngeal erythema.   Eyes:      General: No " scleral icterus.  Cardiovascular:      Rate and Rhythm: Normal rate and regular rhythm.      Heart sounds: Normal heart sounds.   Pulmonary:      Effort: Pulmonary effort is normal.      Breath sounds: Normal breath sounds.   Abdominal:      General: Abdomen is flat. Bowel sounds are normal. There is no distension.      Palpations: Abdomen is soft. There is no mass.      Tenderness: There is no abdominal tenderness. There is no guarding or rebound. Negative signs include Pozo's sign.      Hernia: No hernia is present.   Musculoskeletal:      Cervical back: Neck supple.   Skin:     General: Skin is warm.      Capillary Refill: Capillary refill takes less than 2 seconds.   Neurological:      General: No focal deficit present.      Mental Status: He is alert and oriented to person, place, and time.   Psychiatric:         Mood and Affect: Mood normal.         Behavior: Behavior normal.         Thought Content: Thought content normal.         Judgment: Judgment normal.         CLINICAL DATA REVIEWED   reviewed previous lab results and integrated with today's visit, reviewed notes from other physicians and/or last GI encounter, reviewed previous endoscopy results and available photos, reviewed surgical pathology results from previous biopsies    ASSESSMENT  Diagnoses and all orders for this visit:    Gastroesophageal reflux disease with esophagitis without hemorrhage    Ann's esophagus without dysplasia          PLAN    Continue PPI BID long term  EGD 2/2028    Return in about 1 year (around 3/7/2024).    I have discussed the above plan with the patient.  They verbalize understanding and are in agreement with the plan.  They have been advised to contact the office for any questions, concerns, or changes related to their health.

## 2023-04-04 ENCOUNTER — OFFICE VISIT (OUTPATIENT)
Dept: INTERNAL MEDICINE | Facility: CLINIC | Age: 73
End: 2023-04-04
Payer: MEDICARE

## 2023-04-04 VITALS
HEART RATE: 39 BPM | OXYGEN SATURATION: 97 % | TEMPERATURE: 97.7 F | DIASTOLIC BLOOD PRESSURE: 66 MMHG | WEIGHT: 179 LBS | SYSTOLIC BLOOD PRESSURE: 118 MMHG | RESPIRATION RATE: 16 BRPM | HEIGHT: 72 IN | BODY MASS INDEX: 24.24 KG/M2

## 2023-04-04 DIAGNOSIS — E11.649 TYPE 2 DIABETES MELLITUS WITH HYPOGLYCEMIA WITHOUT COMA, WITH LONG-TERM CURRENT USE OF INSULIN: ICD-10-CM

## 2023-04-04 DIAGNOSIS — Z79.4 TYPE 2 DIABETES MELLITUS WITH HYPOGLYCEMIA WITHOUT COMA, WITH LONG-TERM CURRENT USE OF INSULIN: ICD-10-CM

## 2023-04-04 DIAGNOSIS — R00.1 BRADYCARDIA: Primary | ICD-10-CM

## 2023-04-04 DIAGNOSIS — Z13.6 SCREENING FOR AAA (ABDOMINAL AORTIC ANEURYSM): ICD-10-CM

## 2023-04-04 DIAGNOSIS — K22.70 BARRETT'S ESOPHAGUS WITHOUT DYSPLASIA: ICD-10-CM

## 2023-04-04 NOTE — PROGRESS NOTES
"Chief Complaint  Diabetes (Follow up )    Subjective        Rupesh Fox presents to [facility]  History of Present Illness  Here to follow up DM, doing well without issues or side effects; has noted his HR in the 30s to 40s, no issues with this, no lightheadedness    Gerd, barrets, following with GI    BPH, taking terazosin and finasteride, still waking at night to urinate      Objective   Vital Signs:  /66   Pulse (!) 39   Temp 97.7 °F (36.5 °C)   Resp 16   Ht 182.9 cm (72\")   Wt 81.2 kg (179 lb)   SpO2 97%   BMI 24.28 kg/m²   Estimated body mass index is 24.28 kg/m² as calculated from the following:    Height as of this encounter: 182.9 cm (72\").    Weight as of this encounter: 81.2 kg (179 lb).       BMI is within normal parameters. No other follow-up for BMI required.      Physical Exam  Vitals and nursing note reviewed.   Constitutional:       General: He is not in acute distress.     Appearance: Normal appearance.   HENT:      Head: Normocephalic and atraumatic.      Right Ear: External ear normal.      Left Ear: External ear normal.      Nose: Nose normal.      Mouth/Throat:      Mouth: Mucous membranes are moist.      Pharynx: Oropharynx is clear.   Eyes:      Extraocular Movements: Extraocular movements intact.      Conjunctiva/sclera: Conjunctivae normal.      Pupils: Pupils are equal, round, and reactive to light.   Cardiovascular:      Rate and Rhythm: Normal rate and regular rhythm.      Pulses: Normal pulses.      Heart sounds: Normal heart sounds. No murmur heard.    No gallop.   Pulmonary:      Effort: Pulmonary effort is normal.      Breath sounds: Normal breath sounds.   Abdominal:      General: Abdomen is flat. Bowel sounds are normal. There is no distension.      Palpations: Abdomen is soft. There is no mass.      Tenderness: There is no abdominal tenderness.   Musculoskeletal:         General: No swelling. Normal range of motion. "      Cervical back: Normal range of motion and neck supple.   Skin:     General: Skin is warm and dry.      Findings: No rash.   Neurological:      General: No focal deficit present.      Mental Status: He is alert and oriented to person, place, and time. Mental status is at baseline.   Psychiatric:         Mood and Affect: Mood normal.         Behavior: Behavior normal.        Result Review :                   Assessment and Plan   Diagnoses and all orders for this visit:    1. Bradycardia (Primary)  -     Ambulatory Referral to Cardiology    2. Type 2 diabetes mellitus with hypoglycemia without coma, with long-term current use of insulin  -     Comprehensive Metabolic Panel  -     Hemoglobin A1c  -     Lipid Panel With / Chol / HDL Ratio  -     Microalbumin / Creatinine Urine Ratio - Urine, Clean Catch    3. Ann's esophagus without dysplasia      - check DM labs as above, will adjust meds to goal; has been well controlled with metofromin monotherapy  - bradycardia, referral to cardiology today; has done well historically but not with HR in 30s at times; consider sick sinus changes, counseled on management of this  - barretts, contineu ppi, counseled on life duration of this  - bph, consider urology evaluation       Follow Up   No follow-ups on file.  Patient was given instructions and counseling regarding his condition or for health maintenance advice. Please see specific information pulled into the AVS if appropriate.

## 2023-04-05 LAB
ALBUMIN SERPL-MCNC: 5 G/DL (ref 3.5–5.2)
ALBUMIN/CREAT UR: <2 MG/G CREAT (ref 0–29)
ALBUMIN/GLOB SERPL: 2.9 G/DL
ALP SERPL-CCNC: 65 U/L (ref 39–117)
ALT SERPL-CCNC: 20 U/L (ref 1–41)
AST SERPL-CCNC: 23 U/L (ref 1–40)
BILIRUB SERPL-MCNC: 0.7 MG/DL (ref 0–1.2)
BUN SERPL-MCNC: 28 MG/DL (ref 8–23)
BUN/CREAT SERPL: 22.8 (ref 7–25)
CALCIUM SERPL-MCNC: 10.1 MG/DL (ref 8.6–10.5)
CHLORIDE SERPL-SCNC: 106 MMOL/L (ref 98–107)
CHOLEST SERPL-MCNC: 142 MG/DL (ref 0–200)
CHOLEST/HDLC SERPL: 2.22 {RATIO}
CO2 SERPL-SCNC: 26.1 MMOL/L (ref 22–29)
CREAT SERPL-MCNC: 1.23 MG/DL (ref 0.76–1.27)
CREAT UR-MCNC: 127.4 MG/DL
EGFRCR SERPLBLD CKD-EPI 2021: 62 ML/MIN/1.73
GLOBULIN SER CALC-MCNC: 1.7 GM/DL
GLUCOSE SERPL-MCNC: 133 MG/DL (ref 65–99)
HBA1C MFR BLD: 6.3 % (ref 4.8–5.6)
HDLC SERPL-MCNC: 64 MG/DL (ref 40–60)
LDLC SERPL CALC-MCNC: 68 MG/DL (ref 0–100)
MICROALBUMIN UR-MCNC: <3 UG/ML
POTASSIUM SERPL-SCNC: 5.1 MMOL/L (ref 3.5–5.2)
PROT SERPL-MCNC: 6.7 G/DL (ref 6–8.5)
SODIUM SERPL-SCNC: 143 MMOL/L (ref 136–145)
TRIGL SERPL-MCNC: 45 MG/DL (ref 0–150)
VLDLC SERPL CALC-MCNC: 10 MG/DL (ref 5–40)

## 2023-04-06 DIAGNOSIS — I10 ESSENTIAL HYPERTENSION: ICD-10-CM

## 2023-04-06 DIAGNOSIS — Z79.4 TYPE 2 DIABETES MELLITUS WITH HYPOGLYCEMIA WITHOUT COMA, WITH LONG-TERM CURRENT USE OF INSULIN: ICD-10-CM

## 2023-04-06 DIAGNOSIS — N40.1 BENIGN PROSTATIC HYPERPLASIA WITH URINARY FREQUENCY: ICD-10-CM

## 2023-04-06 DIAGNOSIS — E11.649 TYPE 2 DIABETES MELLITUS WITH HYPOGLYCEMIA WITHOUT COMA, WITH LONG-TERM CURRENT USE OF INSULIN: ICD-10-CM

## 2023-04-06 DIAGNOSIS — R35.0 BENIGN PROSTATIC HYPERPLASIA WITH URINARY FREQUENCY: ICD-10-CM

## 2023-04-06 RX ORDER — FINASTERIDE 5 MG/1
TABLET, FILM COATED ORAL
Qty: 90 TABLET | Refills: 2 | Status: SHIPPED | OUTPATIENT
Start: 2023-04-06

## 2023-04-06 RX ORDER — METFORMIN HYDROCHLORIDE 750 MG/1
TABLET, EXTENDED RELEASE ORAL
Qty: 90 TABLET | Refills: 2 | Status: SHIPPED | OUTPATIENT
Start: 2023-04-06

## 2023-04-06 RX ORDER — LISINOPRIL 5 MG/1
TABLET ORAL
Qty: 90 TABLET | Refills: 2 | Status: SHIPPED | OUTPATIENT
Start: 2023-04-06

## 2023-04-06 NOTE — TELEPHONE ENCOUNTER
Rx Refill Note  Requested Prescriptions     Pending Prescriptions Disp Refills   • metFORMIN ER (GLUCOPHAGE-XR) 750 MG 24 hr tablet [Pharmacy Med Name: METFORMIN HYDROCHLORIDE  MG Tablet Extended Release 24 Hour] 90 tablet 2     Sig: TAKE 1 TABLET EVERY DAY WITH BREAKFAST   • finasteride (PROSCAR) 5 MG tablet [Pharmacy Med Name: FINASTERIDE 5 MG Tablet] 90 tablet 2     Sig: TAKE 1 TABLET EVERY DAY   • lisinopril (PRINIVIL,ZESTRIL) 5 MG tablet [Pharmacy Med Name: LISINOPRIL 5 MG Tablet] 90 tablet 2     Sig: TAKE 1 TABLET EVERY DAY      Last office visit with prescribing clinician: 4/4/2023   Last telemedicine visit with prescribing clinician: 7/6/2023   Next office visit with prescribing clinician: 7/6/2023                         Would you like a call back once the refill request has been completed: [] Yes [] No    If the office needs to give you a call back, can they leave a voicemail: [] Yes [] No    Pebbles Soares MA  04/06/23, 09:16 EDT

## 2023-04-13 DIAGNOSIS — Z13.6 SCREENING FOR AAA (ABDOMINAL AORTIC ANEURYSM): Primary | ICD-10-CM

## 2023-04-17 ENCOUNTER — TRANSCRIBE ORDERS (OUTPATIENT)
Dept: ADMINISTRATIVE | Facility: HOSPITAL | Age: 73
End: 2023-04-17
Payer: MEDICARE

## 2023-04-17 DIAGNOSIS — Z13.6 SCREENING FOR AAA (ABDOMINAL AORTIC ANEURYSM): Primary | ICD-10-CM

## 2023-04-17 DIAGNOSIS — R00.1 BRADYCARDIA, UNSPECIFIED: ICD-10-CM

## 2023-04-17 DIAGNOSIS — Z13.6 SCREENING FOR AAA (AORTIC ABDOMINAL ANEURYSM): ICD-10-CM

## 2023-04-17 DIAGNOSIS — Z13.9 SCREENING DUE: ICD-10-CM

## 2023-05-03 ENCOUNTER — OFFICE VISIT (OUTPATIENT)
Dept: ORTHOPEDIC SURGERY | Facility: CLINIC | Age: 73
End: 2023-05-03
Payer: MEDICARE

## 2023-05-03 VITALS — WEIGHT: 179 LBS | BODY MASS INDEX: 24.24 KG/M2 | HEIGHT: 72 IN

## 2023-05-03 DIAGNOSIS — M17.11 PRIMARY OSTEOARTHRITIS OF RIGHT KNEE: ICD-10-CM

## 2023-05-03 DIAGNOSIS — M17.12 PRIMARY OSTEOARTHRITIS OF LEFT KNEE: Primary | ICD-10-CM

## 2023-05-03 RX ADMIN — LIDOCAINE HYDROCHLORIDE 8 ML: 10 INJECTION, SOLUTION EPIDURAL; INFILTRATION; INTRACAUDAL; PERINEURAL at 11:10

## 2023-05-03 RX ADMIN — TRIAMCINOLONE ACETONIDE 80 MG: 40 INJECTION, SUSPENSION INTRA-ARTICULAR; INTRAMUSCULAR at 11:10

## 2023-05-03 NOTE — PROGRESS NOTES
Subjective:     Patient ID: Rupesh Fox is a 72 y.o. male.    Chief Complaint:  Follow-up bilateral knee pain, DJD  Last injection-2023-cortisone- left knee    History of Present Illness  Rupesh Fox returns to clinic today for evaluation of bilateral knee pain.    Patient is following up for bilateral knee pain, left worse than right. He reports intermittent knee pain described as aching in nature. He rates his pain a 2-3/10 in severity. His pain is worst is along the medial joint line. He has noticed some locking and stiffness of the left knee. Patient states the cortisone injection in the left knee gave him significant symptom relief for about 6 weeks. He has been working with physical therapy 3 times a week and noticed improvement in his right knee and hips, but is still having pain in the left knee. His pain is worse with walking long distances, jogging, and going up and down stairs. Denies any numbness, tingling, fever, or chills.     Social History     Occupational History   • Not on file   Tobacco Use   • Smoking status: Former     Types: Cigarettes     Quit date:      Years since quittin.4   • Smokeless tobacco: Never   Vaping Use   • Vaping Use: Never used   Substance and Sexual Activity   • Alcohol use: Yes     Comment: social minimal   • Drug use: No   • Sexual activity: Defer      Past Medical History:   Diagnosis Date   • Allergic rhinitis    • Barretts esophagus    • Benign prostatic hyperplasia without lower urinary tract symptoms    • Bradycardia, unspecified    • Cellulitis and abscess of toe    • Cervicalgia    • Colon polyp    • Deviated septum    • Diabetes mellitus    • Diabetic eye exam     , 6/4/15 - negative,  - small change in vessel,  - negative, f/u 12 mo, 3/18 - negative,  - negative   • Dysphagia    • Enlarged prostate without lower urinary tract symptoms (luts)    • Essential (primary) hypertension    • Gluteal tendinitis, unspecified hip    • Goiter   "  • Hearing loss    • Hyperlipidemia, unspecified    • Pain in left knee    • Pain in right shoulder    • Problem with sexual function    • Pure hypercholesterolemia    • Sprain     Sprain of hip: Sprain of other specified sites of hip and thigh   • Tinnitus, unspecified ear    • Trochanteric bursitis, left hip    • Type 2 diabetes mellitus without complication      Past Surgical History:   Procedure Laterality Date   • COLONOSCOPY     • COLONOSCOPY N/A 12/06/2017    Procedure: COLONOSCOPY;  Surgeon: Rosalia James MD;  Location: [facility] OR;  Service:    • ENDOSCOPY  05/2015    GERD/dysphagia - erosive gastritis, normal esophagus   • ENDOSCOPY N/A 2/7/2023    Procedure: ESOPHAGOGASTRODUODENOSCOPY;  Surgeon: Lico Mcmillan MD;  Location: [facility] [place] [facility];  Service: Gastroenterology;  Laterality: N/A;  Esophagitis and Gastritis   • FRACTURE SURGERY Left 1972    FX: Femur   • SEPTOPLASTY     • SINUS SURGERY     • TONSILLECTOMY         Family History   Problem Relation Age of Onset   • Benign prostatic hyperplasia Father    • Colon cancer Neg Hx    • Colon polyps Neg Hx          Review of Systems        Objective:  Vitals:    05/03/23 0932   Weight: 81.2 kg (179 lb)   Height: 182.9 cm (72\")         05/03/23  0932   Weight: 81.2 kg (179 lb)     Body mass index is 24.28 kg/m².  Physical Exam    Vital signs reviewed.   General: No acute distress, alert and oriented  Eyes: conjunctiva clear; pupils equally round and reactive  ENT: external ears and nose atraumatic; oropharynx clear  CV: no peripheral edema  Resp: normal respiratory effort  Skin: no rashes or wounds; normal turgor  Psych: mood and affect appropriate; recent and remote memory intact        Ortho Exam     Bilateral Knees-  Maximal tenderness to palpation along the medial joint line.  Active range of motion is 0-130 degrees.  Strength with flexion is 4+/5.  Strength with extension is 4+/5.  Mild effusion of the left knee.   Stable with varus and valgus " stress at 0 and 130 degrees.  Brittany exam- positive for pain, but no clicking  Lachman test- Grade 1A  Anterior drawer- negative   Posterior drawer- negative  Dorsalis pedis pulse is 2+.      Imaging:  None today  Assessment:        1. Primary osteoarthritis of left knee    2. Primary osteoarthritis of right knee           Plan:      Large Joint Arthrocentesis: L knee  Date/Time: 5/3/2023 11:10 AM  Consent given by: patient  Site marked: site marked  Timeout: Immediately prior to procedure a time out was called to verify the correct patient, procedure, equipment, support staff and site/side marked as required   Supporting Documentation  Indications: pain   Procedure Details  Location: knee - L knee  Preparation: Patient was prepped and draped in the usual sterile fashion  Needle size: 22 G  Approach: anterolateral  Medications administered: 8 mL lidocaine PF 1% 1 %; 80 mg triamcinolone acetonide 40 MG/ML  Patient tolerance: patient tolerated the procedure well with no immediate complications             1. Discussed treatment options at length with patient at today's visit. Discussed options of prescription antiinflammatories, injections, and total knee arthroplasty. Patient would like to proceed with cortisone injection of the left knee today, as he had significant symptom relief with this in the past. He will return in 6 weeks for visco supplementation injection of the left knee. As his symptoms are mild in the right knee, he would like to hold off on injection at this time. Patient will continue physical therapy to work on strengthening exercises.   2. Follow up: 6 weeks      Rupesh Fox was in agreement with plan and had all questions answered.     Orders:  Orders Placed This Encounter   Procedures   • Large Joint Arthrocentesis: L knee       Medications:  No orders of the defined types were placed in this encounter.      Followup:  Return for viscosupplement injections.    Diagnoses and all orders for this  visit:    1. Primary osteoarthritis of left knee (Primary)    2. Primary osteoarthritis of right knee    Other orders  -     Large Joint Arthrocentesis: L knee          Dictated utilizing Dragon dictation

## 2023-05-16 RX ORDER — TRIAMCINOLONE ACETONIDE 40 MG/ML
80 INJECTION, SUSPENSION INTRA-ARTICULAR; INTRAMUSCULAR
Status: COMPLETED | OUTPATIENT
Start: 2023-05-03 | End: 2023-05-03

## 2023-05-16 RX ORDER — LIDOCAINE HYDROCHLORIDE 10 MG/ML
8 INJECTION, SOLUTION EPIDURAL; INFILTRATION; INTRACAUDAL; PERINEURAL
Status: COMPLETED | OUTPATIENT
Start: 2023-05-03 | End: 2023-05-03

## 2023-05-17 ENCOUNTER — OFFICE VISIT (OUTPATIENT)
Dept: CARDIOLOGY | Facility: CLINIC | Age: 73
End: 2023-05-17
Payer: MEDICARE

## 2023-05-17 VITALS
BODY MASS INDEX: 23.99 KG/M2 | HEART RATE: 54 BPM | WEIGHT: 181 LBS | HEIGHT: 73 IN | SYSTOLIC BLOOD PRESSURE: 110 MMHG | DIASTOLIC BLOOD PRESSURE: 70 MMHG

## 2023-05-17 DIAGNOSIS — I10 PRIMARY HYPERTENSION: ICD-10-CM

## 2023-05-17 DIAGNOSIS — R00.1 BRADYCARDIA: Primary | ICD-10-CM

## 2023-05-17 DIAGNOSIS — R06.02 SHORTNESS OF BREATH: ICD-10-CM

## 2023-05-17 DIAGNOSIS — E11.9 TYPE 2 DIABETES MELLITUS WITHOUT COMPLICATION, WITHOUT LONG-TERM CURRENT USE OF INSULIN: ICD-10-CM

## 2023-05-17 DIAGNOSIS — R94.31 ABNORMAL ELECTROCARDIOGRAM (ECG) (EKG): ICD-10-CM

## 2023-05-17 DIAGNOSIS — E78.2 MIXED HYPERLIPIDEMIA: ICD-10-CM

## 2023-05-17 NOTE — PROGRESS NOTES
Date of Office Visit: 2023  Encounter Provider: Lissy Oneal MD  Place of Service: [facility]  Patient Name: Rupesh Fox  :1950      Patient ID:  Rupesh Fox is a 72 y.o. male is here for bradycardia          History of Present Illness    He has a history of hypertension, GERD, diabetes mellitus, laryngopharyngeal reflux disease, history of rheumatic fever as a child without sequela, history melanoma, hyperlipidemia.  He is here for bradycardia.    His dad had heart failure and stroke.  His mom also at her strokes and hypertension.  He is , has 2 children, quit smoking , has 2 beers per month, 2 coffees per day and no drugs.  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    Labs on 2023 showed glucose 133, otherwise normal CMP, hemoglobin A1c 6.3%, HDL 64, LDL 60, triglycerides 45, total cholesterol 142, VLDL 10.    He is very active.  He does not experience short windedness with activity but does have short windedness at rest especially if he is having a lot of reflux or producing a lot of mucus.  He has been congested for a while with this.  He has been seeing ENT for this.  He does not feel his heart racing and he has no dizziness, syncope or near syncope.  He has no exertional fatigue.  He has no orthopnea or PND.  He has no exertional chest tightness or pressure.  He does not wake out of sleep gasping or feeling nauseated.  He feels like most of his issues with his breathing are all related allergies and ENT issues-GERD, LPRD, deviated nasal septum status post surgery which did not really help.    Past Medical History:   Diagnosis Date   • Allergic rhinitis    • Barretts esophagus    • Benign prostatic hyperplasia without lower urinary tract symptoms    • Bradycardia, unspecified    • Cancer melanoma   • Cellulitis and abscess of toe    • Cervicalgia    • Colon polyp     • Deviated septum    • Diabetes mellitus    • Diabetic eye exam     5/14, 6/4/15 - negative, 6/16 - small change in vessel, 5/17 - negative, f/u 12 mo, 3/18 - negative, 4/19 - negative   • Dysphagia    • Enlarged prostate without lower urinary tract symptoms (luts)    • Essential (primary) hypertension    • Gluteal tendinitis, unspecified hip    • Goiter    • Hearing loss    • Hyperlipidemia, unspecified    • Pain in left knee    • Pain in right shoulder    • Problem with sexual function    • Pure hypercholesterolemia    • Sprain     Sprain of hip: Sprain of other specified sites of hip and thigh   • Tinnitus, unspecified ear    • Trochanteric bursitis, left hip    • Type 2 diabetes mellitus without complication          Past Surgical History:   Procedure Laterality Date   • COLONOSCOPY     • COLONOSCOPY N/A 12/06/2017    Procedure: COLONOSCOPY;  Surgeon: Rosalia James MD;  Location: [facility] OR;  Service:    • ENDOSCOPY  05/2015    GERD/dysphagia - erosive gastritis, normal esophagus   • ENDOSCOPY N/A 02/07/2023    Procedure: ESOPHAGOGASTRODUODENOSCOPY;  Surgeon: Lico Mcmillan MD;  Location: [facility];  Service: Gastroenterology;  Laterality: N/A;  Esophagitis and Gastritis   • FRACTURE SURGERY Left 1972    FX: Femur   • SEPTOPLASTY     • SINUS SURGERY     • TONSILLECTOMY         Current Outpatient Medications on File Prior to Visit   Medication Sig Dispense Refill   • azelastine (ASTELIN) 0.1 % nasal spray 2 sprays into the nostril(s) as directed by provider 2 (Two) Times a Day. Use in each nostril as directed     • finasteride (PROSCAR) 5 MG tablet TAKE 1 TABLET EVERY DAY 90 tablet 2   • Glucos-Chondroit-Hyaluron-MSM (Glucosamine Chondroitin Joint) tablet Take  by mouth 2 (Two) Times a Day.     • glucose blood test strip Use as instructed 360 each 12   • lisinopril (PRINIVIL,ZESTRIL) 5 MG tablet TAKE 1 TABLET EVERY DAY 90 tablet 2   • metFORMIN ER (GLUCOPHAGE-XR) 750 MG 24 hr tablet TAKE 1  "TABLET EVERY DAY WITH BREAKFAST 90 tablet 2   • pantoprazole (PROTONIX) 40 MG EC tablet Take 1 tablet by mouth 2 (Two) Times a Day. 180 tablet 2   • simvastatin (ZOCOR) 20 MG tablet Take 1 tablet by mouth Every Night. 90 tablet 2   • terazosin (HYTRIN) 10 MG capsule Take 1 capsule by mouth Every Night. 90 capsule 2     No current facility-administered medications on file prior to visit.       Social History     Socioeconomic History   • Marital status:    Tobacco Use   • Smoking status: Former     Packs/day: 1.00     Years: 10.00     Pack years: 10.00     Types: Cigarettes, Pipe, Cigars     Start date: 1965     Quit date: 1975     Years since quittin.4     Passive exposure: Past   • Smokeless tobacco: Never   • Tobacco comments:     Caffeine: 2 cups coffee daily   Vaping Use   • Vaping Use: Never used   Substance and Sexual Activity   • Alcohol use: Yes     Alcohol/week: 1.0 standard drink     Types: 1 Cans of beer per week     Comment: social minimal   • Drug use: Never   • Sexual activity: Yes           ROS    Procedures    ECG 12 Lead    Date/Time: 2023 8:33 AM  Performed by: Lissy Oneal MD  Authorized by: Lissy Oneal MD   Comparison: compared with previous ECG   Similar to previous ECG  Rhythm: sinus rhythm    Clinical impression: normal ECG                Objective:      Vitals:    23 0825   BP: 110/70   Pulse: 54   Weight: 82.1 kg (181 lb)   Height: 185.4 cm (73\")     Body mass index is 23.88 kg/m².    Vitals reviewed.   Constitutional:       General: Not in acute distress.     Appearance: Well-developed. Not diaphoretic.   Eyes:      General: No scleral icterus.     Conjunctiva/sclera: Conjunctivae normal.   HENT:      Head: Normocephalic and atraumatic.   Neck:      Thyroid: No thyromegaly.      Vascular: No carotid bruit or JVD.      Lymphadenopathy: No cervical adenopathy.   Pulmonary:      Effort: Pulmonary effort is normal. No respiratory distress.      " Breath sounds: Normal breath sounds. No wheezing. No rhonchi. No rales.   Chest:      Chest wall: Not tender to palpatation.   Cardiovascular:      Normal rate. Regular rhythm.      Murmurs: There is no murmur.      No gallop.   Pulses:     Intact distal pulses.   Edema:     Peripheral edema absent.   Abdominal:      General: Bowel sounds are normal. There is no distension or abdominal bruit.      Palpations: Abdomen is soft. There is no abdominal mass.      Tenderness: There is no abdominal tenderness.   Musculoskeletal:         General: No deformity.      Extremities: No clubbing present.     Cervical back: Neck supple. Skin:     General: Skin is warm and dry. There is no cyanosis.      Coloration: Skin is not pale.      Findings: No rash.   Neurological:      Mental Status: Alert and oriented to person, place, and time.      Cranial Nerves: No cranial nerve deficit.   Psychiatric:         Judgment: Judgment normal.         Lab Review:       Assessment:      Diagnosis Plan   1. Bradycardia  TSH    Magnesium    Adult Transthoracic Echo Complete W/ Cont if Necessary Per Protocol      2. Primary hypertension  TSH    Magnesium    Adult Transthoracic Echo Complete W/ Cont if Necessary Per Protocol      3. Mixed hyperlipidemia        4. Type 2 diabetes mellitus without complication, without long-term current use of insulin        5. Shortness of breath        6. Abnormal electrocardiogram (ECG) (EKG)  Adult Transthoracic Echo Complete W/ Cont if Necessary Per Protocol        1. Bradycardia, asymptomatic.  Check TSH and magnesium.  2. Dyspnea, nonexertional.  3. Hypertension, goal <120/80.   4. Hyperlipidemia, on simvastatin  5. Diabetes mellitus type 2  6. Laryngopharyngeal reflux disease and GERD-follows with ENT.  Causes hoarseness of his voice, throat soreness and short windedness.  7. History of melanoma     Plan:       Set up echocardiogram, check magnesium and TSH, see Lyubov donnelly [place] in 1 year, no medication  "changes.  His bradycardia is not associated with symptoms and he is on no medications to lower his heart rate.  He is active and feels well, no further work-up is really indicated for this.    STOP-Bang Score  Have you been diagnosed with Sleep Apnea?: no  Snoring?: no  Tired?: no  Observed?: no  Pressure?: yes  Stop Score: 1  Body Mass Index more than 35 kg/m2?: no  Age older than 50 year old?: yes  Neck large? \">17\"/43cm-M, >16\"/41cm-F: no  Gender=Male?: yes  Total Stop-Bang Score: 3    "

## 2023-05-19 ENCOUNTER — LAB (OUTPATIENT)
Dept: LAB | Facility: HOSPITAL | Age: 73
End: 2023-05-19
Payer: MEDICARE

## 2023-05-19 ENCOUNTER — TELEPHONE (OUTPATIENT)
Dept: CARDIOLOGY | Facility: CLINIC | Age: 73
End: 2023-05-19
Payer: MEDICARE

## 2023-05-19 DIAGNOSIS — R00.1 BRADYCARDIA: ICD-10-CM

## 2023-05-19 DIAGNOSIS — I10 PRIMARY HYPERTENSION: ICD-10-CM

## 2023-05-19 LAB
MAGNESIUM SERPL-MCNC: 2.3 MG/DL (ref 1.6–2.4)
TSH SERPL DL<=0.05 MIU/L-ACNC: 1.26 UIU/ML (ref 0.27–4.2)

## 2023-05-19 PROCEDURE — 83735 ASSAY OF MAGNESIUM: CPT

## 2023-05-19 PROCEDURE — 36415 COLL VENOUS BLD VENIPUNCTURE: CPT

## 2023-05-19 PROCEDURE — 84443 ASSAY THYROID STIM HORMONE: CPT

## 2023-05-22 NOTE — TELEPHONE ENCOUNTER
Notified patient of results, patient verbalizes understanding.    Betsy Haney RN  [facility] Triage  05/22/23 09:01 EDT

## 2023-05-31 ENCOUNTER — CLINICAL SUPPORT (OUTPATIENT)
Dept: ORTHOPEDIC SURGERY | Facility: CLINIC | Age: 73
End: 2023-05-31

## 2023-05-31 VITALS — HEIGHT: 73 IN | BODY MASS INDEX: 23.99 KG/M2 | WEIGHT: 181 LBS

## 2023-05-31 DIAGNOSIS — M17.12 PRIMARY OSTEOARTHRITIS OF LEFT KNEE: Primary | ICD-10-CM

## 2023-05-31 NOTE — PROGRESS NOTES
Large Joint Arthrocentesis: L knee  Date/Time: 5/31/2023 8:56 AM  Consent given by: patient  Site marked: site marked  Timeout: Immediately prior to procedure a time out was called to verify the correct patient, procedure, equipment, support staff and site/side marked as required   Supporting Documentation  Indications: pain   Procedure Details  Location: knee - L knee  Preparation: Patient was prepped and draped in the usual sterile fashion  Needle gauge: 21G.  Approach: anterolateral  Medications administered: 88 mg Hyaluronan 88 MG/4ML  Patient tolerance: patient tolerated the procedure well with no immediate complications        Patient presents to clinic today for left knee viscosupplement one shot injection. I explained details of injections as well as risks, benefits and alternatives with the patient today, had all questions answered, wished to proceed with injection.  I will see patient back in 6 weeks for follow up on injection. Patient was instructed to watch for signs or symptoms of infection including redness, swelling, warmth to the touch, or significant increased pain and to contact our office immediately if any of these issues were noted.

## 2023-07-27 ENCOUNTER — OFFICE VISIT (OUTPATIENT)
Dept: ORTHOPEDIC SURGERY | Facility: CLINIC | Age: 73
End: 2023-07-27
Payer: MEDICARE

## 2023-07-27 VITALS
HEIGHT: 73 IN | DIASTOLIC BLOOD PRESSURE: 62 MMHG | SYSTOLIC BLOOD PRESSURE: 113 MMHG | BODY MASS INDEX: 23.99 KG/M2 | HEART RATE: 42 BPM | WEIGHT: 181 LBS

## 2023-07-27 DIAGNOSIS — M54.16 LUMBAR RADICULOPATHY: Primary | ICD-10-CM

## 2023-07-27 NOTE — PROGRESS NOTES
Subjective:     Patient ID: Rupesh Fox is a 72 y.o. male.    Chief Complaint:    Lower Extremity Issue  The symptoms are aggravated by movement and weight bearing.   Rupesh Fox presents to clinic today for evaluation of posterior lateral buttock/low back pain that began a few weeks ago where he was cutting up a tree with a chainsaw and kicked a stump.  He felt the jolt in his low back/buttock and had radiating pain down his leg.  He states the pain is about 75% gone is continuing to improve.  He states that he sees Dr. Agustin for his knees and has had multiple knee injections for arthritis.  He states that he has been able to return to most of his activities but has not been able to return to jogging.  He is here today for further evaluation and management.  He denies any foot numbness or any anterior groin pain or lateral hip pain.     Social History     Occupational History    Occupation: retired   Tobacco Use    Smoking status: Former     Packs/day: 1.00     Years: 10.00     Pack years: 10.00     Types: Cigarettes, Pipe, Cigars     Start date: 1965     Quit date: 1975     Years since quittin.6     Passive exposure: Past    Smokeless tobacco: Never    Tobacco comments:     Caffeine: 2 cups coffee daily   Vaping Use    Vaping Use: Never used   Substance and Sexual Activity    Alcohol use: Yes     Alcohol/week: 1.0 standard drink     Types: 1 Cans of beer per week     Comment: social minimal    Drug use: Never    Sexual activity: Yes      Past Medical History:   Diagnosis Date    Allergic rhinitis     Arthritis of neck     Barretts esophagus     Benign prostatic hyperplasia without lower urinary tract symptoms     Bradycardia, unspecified     Cancer melanoma    Cellulitis and abscess of toe     Cervicalgia     Colon polyp     Deviated septum     Diabetes mellitus     Diabetic eye exam     , /4/15 - negative,  - small change in vessel,  - negative, f/u 12 mo, 3/18 - negative,  -  "negative    Dysphagia     Enlarged prostate without lower urinary tract symptoms (luts)     Essential (primary) hypertension     Fracture, femur     Fracture, foot 1972    Gluteal tendinitis, unspecified hip     Goiter     Hearing loss     Hip arthrosis     Hyperlipidemia, unspecified     Knee swelling     Low back strain     Pain in left knee     Pain in right shoulder     Problem with sexual function     Pure hypercholesterolemia     Sprain     Sprain of hip: Sprain of other specified sites of hip and thigh    Tinnitus, unspecified ear     Trochanteric bursitis, left hip     Type 2 diabetes mellitus without complication      Past Surgical History:   Procedure Laterality Date    COLONOSCOPY      COLONOSCOPY N/A 12/06/2017    Procedure: COLONOSCOPY;  Surgeon: Rosalia James MD;  Location:  [facility] OR;  Service:     ENDOSCOPY  05/2015    GERD/dysphagia - erosive gastritis, normal esophagus    ENDOSCOPY N/A 02/07/2023    Procedure: ESOPHAGOGASTRODUODENOSCOPY;  Surgeon: Lico Mcmillan MD;  [place] [facility] [place];  Service: Gastroenterology;  Laterality: N/A;  Esophagitis and Gastritis    FRACTURE SURGERY Left 1972    FX: Femur    SEPTOPLASTY      SINUS SURGERY      TONSILLECTOMY         Family History   Problem Relation Age of Onset    Hypertension Mother     Stroke Mother     Stroke Father     Heart failure Father     Benign prostatic hyperplasia Father     Colon cancer Neg Hx     Colon polyps Neg Hx                  Objective:  Vitals:    07/27/23 0934   BP: 113/62   Pulse: (!) 42   Weight: 82.1 kg (181 lb)   Height: 185.4 cm (73\")         07/27/23  0934   Weight: 82.1 kg (181 lb)     Body mass index is 23.88 kg/m².        Left Hip Exam     Tenderness   The patient is experiencing tenderness in the posterior.    Range of Motion   Flexion:  110   External rotation:  40   Internal rotation: 10     Muscle Strength   Flexion: 5/5     Tests   YOEL: negative  Fadir:  Negative FADIR test    Other   Erythema: " "absent  Scars: absent  Sensation: normal  Pulse: present      Back Exam     Tenderness   The patient is experiencing tenderness in the sacroiliac and lumbar.             Imaging: 3 views of the left hip and pelvis were ordered and reviewed by myself in the office today  Indication: Left hip pain  Findings: X-rays demonstrate mild degenerative changes narrowing of the left hip with no signs of fracture dislocation or subluxation.  No acute osseous abnormality noted.  Right hip is well-preserved  Comparative studies: None    Assessment:        1. Lumbar radiculopathy           Plan:          Discussed treatment options at length with patient at today's visit.  Discussed with the patient that he has negative Stinchfield and logroll no anterior groin pain or lateral hip pain.  All of his pain is located in his posterior lateral buttock and radiates down his leg.  I discussed with him that this is lumbar radiculopathy likely coming from foraminal stenosis not from true hip pathology.  I discussed with him that he does have mild degenerative changes but is currently asymptomatic from his hip mild narrowing.  I discussed with him that since his \"hip pain\" is improving and is roughly 75% better I recommend he continue to keep an eye on it.  I will likely continue to improve.  I discussed with him that the treatments for lumbar radiculopathy are things like oral steroids nonsteroidal anti-inflammatories physical therapy stretching MRI and lumbar epidural injections.  He is not interested in any of those at this point time he would like to continue to observe it in hopes that I will make a full recovery.  I recommended for returns to get back into see his primary care doctor and they can discuss further treatments.  Follow up: As needed      Rupesh Fox was in agreement with plan and had all questions answered.     Medications:  No orders of the defined types were placed in this encounter.      Followup:  No follow-ups on " file.    Diagnoses and all orders for this visit:    1. Lumbar radiculopathy (Primary)  -     XR Hip With or Without Pelvis 2 - 3 View Left          Dictated utilizing Dragon dictation

## 2023-07-31 ENCOUNTER — TELEPHONE (OUTPATIENT)
Dept: INTERNAL MEDICINE | Facility: CLINIC | Age: 73
End: 2023-07-31

## 2023-07-31 NOTE — TELEPHONE ENCOUNTER
"  Hub staff attempted to follow warm transfer process and was unsuccessful     Caller: Rupesh Fox \"Bakari\"    Relationship to patient: Self    Best call back number: [phone]     Patient is needing: PATIENT IS RETURNING A CALL FROM DR MATHUR    PLEASE CALL AND ADVISE   "

## 2023-08-07 ENCOUNTER — TELEPHONE (OUTPATIENT)
Dept: INTERNAL MEDICINE | Facility: CLINIC | Age: 73
End: 2023-08-07
Payer: MEDICARE

## 2023-08-07 DIAGNOSIS — E11.649 TYPE 2 DIABETES MELLITUS WITH HYPOGLYCEMIA WITHOUT COMA, WITH LONG-TERM CURRENT USE OF INSULIN: Primary | ICD-10-CM

## 2023-08-07 DIAGNOSIS — Z79.4 TYPE 2 DIABETES MELLITUS WITH HYPOGLYCEMIA WITHOUT COMA, WITH LONG-TERM CURRENT USE OF INSULIN: Primary | ICD-10-CM

## 2023-08-22 ENCOUNTER — HOSPITAL ENCOUNTER (OUTPATIENT)
Dept: CARDIOLOGY | Facility: HOSPITAL | Age: 73
Discharge: HOME | End: 2023-08-22
Admitting: INTERNAL MEDICINE

## 2023-08-22 VITALS
DIASTOLIC BLOOD PRESSURE: 80 MMHG | HEART RATE: 40 BPM | SYSTOLIC BLOOD PRESSURE: 130 MMHG | WEIGHT: 180 LBS | HEIGHT: 73 IN | BODY MASS INDEX: 23.86 KG/M2

## 2023-08-22 DIAGNOSIS — R00.1 BRADYCARDIA, UNSPECIFIED: ICD-10-CM

## 2023-08-22 DIAGNOSIS — Z13.6 SCREENING FOR AAA (AORTIC ABDOMINAL ANEURYSM): ICD-10-CM

## 2023-08-22 LAB
BH CV ECHO MEAS - DIST AO DIAM: 1.64 CM
BH CV VAS BP LEFT ARM: NORMAL MMHG
BH CV VAS BP RIGHT ARM: NORMAL MMHG
BH CV XLRA MEAS - MID AO DIAM: 1.9 CM
BH CV XLRA MEAS - PROX AO DIAM: 2.14 CM
MAXIMAL PREDICTED HEART RATE: 147
STRESS TARGET HR: 125

## 2023-08-22 PROCEDURE — 93799 UNLISTED CV SVC/PROCEDURE: CPT

## 2023-10-05 DIAGNOSIS — R35.0 BENIGN PROSTATIC HYPERPLASIA WITH URINARY FREQUENCY: ICD-10-CM

## 2023-10-05 DIAGNOSIS — Z79.4 TYPE 2 DIABETES MELLITUS WITH HYPOGLYCEMIA WITHOUT COMA, WITH LONG-TERM CURRENT USE OF INSULIN: ICD-10-CM

## 2023-10-05 DIAGNOSIS — E78.2 MIXED HYPERLIPIDEMIA: ICD-10-CM

## 2023-10-05 DIAGNOSIS — N40.1 BENIGN PROSTATIC HYPERPLASIA WITH URINARY FREQUENCY: ICD-10-CM

## 2023-10-05 DIAGNOSIS — E11.649 TYPE 2 DIABETES MELLITUS WITH HYPOGLYCEMIA WITHOUT COMA, WITH LONG-TERM CURRENT USE OF INSULIN: ICD-10-CM

## 2023-10-06 NOTE — TELEPHONE ENCOUNTER
Rx Refill Note  Requested Prescriptions     Pending Prescriptions Disp Refills    simvastatin (ZOCOR) 20 MG tablet [Pharmacy Med Name: SIMVASTATIN 20 MG Tablet] 90 tablet 2     Sig: TAKE 1 TABLET BY MOUTH EVERY NIGHT.    terazosin (HYTRIN) 10 MG capsule [Pharmacy Med Name: TERAZOSIN HCL 10 MG Capsule] 90 capsule 2     Sig: TAKE 1 CAPSULE BY MOUTH EVERY NIGHT.      Last office visit with prescribing clinician: 7/6/2023   Last telemedicine visit with prescribing clinician: Visit date not found   Next office visit with prescribing clinician: 10/20/2023                         Would you like a call back once the refill request has been completed: [] Yes [] No    If the office needs to give you a call back, can they leave a voicemail: [] Yes [] No    Avelino Madrdi MA  10/06/23, 07:47 EDT

## 2023-10-08 RX ORDER — TERAZOSIN 10 MG/1
10 CAPSULE ORAL NIGHTLY
Qty: 90 CAPSULE | Refills: 2 | Status: SHIPPED | OUTPATIENT
Start: 2023-10-08

## 2023-10-08 RX ORDER — SIMVASTATIN 20 MG
20 TABLET ORAL NIGHTLY
Qty: 90 TABLET | Refills: 2 | Status: SHIPPED | OUTPATIENT
Start: 2023-10-08

## 2023-10-20 ENCOUNTER — OFFICE VISIT (OUTPATIENT)
Dept: INTERNAL MEDICINE | Facility: CLINIC | Age: 73
End: 2023-10-20
Payer: MEDICARE

## 2023-10-20 VITALS
DIASTOLIC BLOOD PRESSURE: 78 MMHG | RESPIRATION RATE: 18 BRPM | SYSTOLIC BLOOD PRESSURE: 122 MMHG | WEIGHT: 182.1 LBS | BODY MASS INDEX: 24.13 KG/M2 | HEIGHT: 73 IN | OXYGEN SATURATION: 97 % | HEART RATE: 54 BPM

## 2023-10-20 DIAGNOSIS — Z79.4 TYPE 2 DIABETES MELLITUS WITH HYPOGLYCEMIA WITHOUT COMA, WITH LONG-TERM CURRENT USE OF INSULIN: ICD-10-CM

## 2023-10-20 DIAGNOSIS — Z00.00 WELL ADULT EXAM: Primary | ICD-10-CM

## 2023-10-20 DIAGNOSIS — I10 ESSENTIAL HYPERTENSION: ICD-10-CM

## 2023-10-20 DIAGNOSIS — E11.649 TYPE 2 DIABETES MELLITUS WITH HYPOGLYCEMIA WITHOUT COMA, WITH LONG-TERM CURRENT USE OF INSULIN: ICD-10-CM

## 2023-10-20 PROCEDURE — 3078F DIAST BP <80 MM HG: CPT | Performed by: INTERNAL MEDICINE

## 2023-10-20 PROCEDURE — G0008 ADMIN INFLUENZA VIRUS VAC: HCPCS | Performed by: INTERNAL MEDICINE

## 2023-10-20 PROCEDURE — G0439 PPPS, SUBSEQ VISIT: HCPCS | Performed by: INTERNAL MEDICINE

## 2023-10-20 PROCEDURE — 90662 IIV NO PRSV INCREASED AG IM: CPT | Performed by: INTERNAL MEDICINE

## 2023-10-20 PROCEDURE — 3044F HG A1C LEVEL LT 7.0%: CPT | Performed by: INTERNAL MEDICINE

## 2023-10-20 PROCEDURE — 3074F SYST BP LT 130 MM HG: CPT | Performed by: INTERNAL MEDICINE

## 2023-10-20 RX ORDER — MULTIPLE VITAMINS W/ MINERALS TAB 9MG-400MCG
1 TAB ORAL DAILY
COMMUNITY

## 2023-10-20 NOTE — PROGRESS NOTES
"Chief Complaint   Patient presents with   • Medicare Wellness-subsequent       Subjective   Rupesh Fox is a 73 y.o. male.     History of Present Illness  HLD, HTN, doing well, no chest pain, sob, headaches, vision changes, no lows; keeping home log, normal         The following portions of the patient's history were reviewed and updated as appropriate: allergies, current medications, past family history, past medical history, past social history, past surgical history, and problem list.    Review of Systems      Objective   Body mass index is 24.03 kg/m².   Vitals:    10/20/23 0814   BP: 122/78   Pulse: 54   Resp: 18   SpO2: 97%   Weight: 82.6 kg (182 lb 1.6 oz)   Height: 185.4 cm (73\")        Physical Exam  Vitals and nursing note reviewed.   Constitutional:       General: He is not in acute distress.     Appearance: Normal appearance.   HENT:      Head: Normocephalic and atraumatic.      Right Ear: External ear normal.      Left Ear: External ear normal.      Nose: Nose normal.      Mouth/Throat:      Mouth: Mucous membranes are moist.      Pharynx: Oropharynx is clear.   Eyes:      Extraocular Movements: Extraocular movements intact.      Conjunctiva/sclera: Conjunctivae normal.      Pupils: Pupils are equal, round, and reactive to light.   Cardiovascular:      Rate and Rhythm: Normal rate and regular rhythm.      Pulses: Normal pulses.      Heart sounds: Normal heart sounds. No murmur heard.     No gallop.   Pulmonary:      Effort: Pulmonary effort is normal.      Breath sounds: Normal breath sounds.   Abdominal:      General: Abdomen is flat. Bowel sounds are normal. There is no distension.      Palpations: Abdomen is soft. There is no mass.      Tenderness: There is no abdominal tenderness.   Musculoskeletal:         General: No swelling. Normal range of motion.      Cervical back: Normal range of motion and neck supple.   Skin:     General: Skin is warm and dry.      Findings: No rash.   Neurological:     "  General: No focal deficit present.      Mental Status: He is alert and oriented to person, place, and time. Mental status is at baseline.   Psychiatric:         Mood and Affect: Mood normal.         Behavior: Behavior normal.         Current Outpatient Medications:   •  finasteride (PROSCAR) 5 MG tablet, TAKE 1 TABLET EVERY DAY, Disp: 90 tablet, Rfl: 2  •  Glucos-Chondroit-Hyaluron-MSM (Glucosamine Chondroitin Joint) tablet, Take  by mouth 2 (Two) Times a Day., Disp: , Rfl:   •  glucose blood test strip, Use as instructed, Disp: 360 each, Rfl: 12  •  lisinopril (PRINIVIL,ZESTRIL) 5 MG tablet, TAKE 1 TABLET EVERY DAY, Disp: 90 tablet, Rfl: 2  •  metFORMIN ER (GLUCOPHAGE-XR) 750 MG 24 hr tablet, TAKE 1 TABLET EVERY DAY WITH BREAKFAST, Disp: 90 tablet, Rfl: 2  •  multivitamin with minerals (MULTIVITAMIN ADULTS PO), Take 1 tablet by mouth Daily., Disp: , Rfl:   •  pantoprazole (PROTONIX) 40 MG EC tablet, Take 1 tablet by mouth 2 (Two) Times a Day., Disp: 180 tablet, Rfl: 2  •  simvastatin (ZOCOR) 20 MG tablet, TAKE 1 TABLET BY MOUTH EVERY NIGHT., Disp: 90 tablet, Rfl: 2  •  terazosin (HYTRIN) 10 MG capsule, TAKE 1 CAPSULE BY MOUTH EVERY NIGHT., Disp: 90 capsule, Rfl: 2     Lab Results   Component Value Date    HGBA1C 6.3 (H) 10/20/2023    TSH 1.260 05/19/2023        Health Maintenance   Topic Date Due   • DIABETIC EYE EXAM  04/23/2020   • ZOSTER VACCINE (3 of 3) 02/24/2022   • COLORECTAL CANCER SCREENING  12/06/2022   • COVID-19 Vaccine (4 - 2023-24 season) 10/03/2023   • HEMOGLOBIN A1C  04/20/2024   • DIABETIC FOOT EXAM  07/06/2024   • ANNUAL WELLNESS VISIT  10/20/2024   • LIPID PANEL  10/20/2024   • URINE MICROALBUMIN  10/20/2024   • TDAP/TD VACCINES (2 - Td or Tdap) 01/14/2030   • HEPATITIS C SCREENING  Completed   • INFLUENZA VACCINE  Completed   • Pneumococcal Vaccine 65+  Completed   • AAA SCREEN (ONE-TIME)  Completed        Immunization History   Administered Date(s) Administered   • COVID-19 (MODERNA) BIVALENT  12+YRS 08/08/2023   • COVID-19 (MODERNA) Monovalent Original Booster 11/09/2021, 07/18/2022   • Flu Vaccine Intradermal Quad 18-64YR 10/07/2016   • Flu Vaccine Quad PF >36MO 10/13/2017   • Fluzone High Dose =>65 Years ([employer] ONLY) 10/09/2015, 10/12/2018, 10/15/2019   • Fluzone High-Dose 65+yrs 10/06/2021, 10/18/2022, 10/20/2023   • Hepatitis B 07/02/2021, 09/02/2021, 01/06/2022   • Influenza, Unspecified 10/16/2020   • Pneumococcal Conjugate 13-Valent (PCV13) 10/13/2017   • Pneumococcal Conjugate 20-Valent (PCV20) 10/18/2022   • Pneumococcal Polysaccharide (PPSV23) 04/05/2013   • Shingrix 12/30/2021   • Tdap 01/14/2020   • Zostavax 10/09/2014       Assessment & Plan   Diagnoses and all orders for this visit:    1. Well adult exam (Primary)  -     Comprehensive Metabolic Panel    2. Type 2 diabetes mellitus with hypoglycemia without coma, with long-term current use of insulin  -     Microalbumin / Creatinine Urine Ratio - Urine, Clean Catch  -     CBC & Differential  -     Comprehensive Metabolic Panel  -     Lipid Panel With / Chol / HDL Ratio  -     Hemoglobin A1c    3. Essential hypertension  -     ECG 12 Lead    Other orders  -     Fluzone High-Dose 65+yrs (1510-6239)      ECG 12 Lead    Date/Time: 11/15/2023 1:47 PM  Performed by: Moreno Martinez MD    Authorized by: Moreno Martinez MD  Comparison: compared with previous ECG from 5/17/2023  Rhythm: sinus rhythm  Rate: normal  Conduction: conduction normal  ST Segments: ST segments normal  T Waves: T waves normal  QRS axis: normal    Clinical impression: normal ECG             Well adult exam  - labs checked and evaluated    Colonoscopy - counseled, getting records  Prostate - counseled  Glaucoma - yearly  AAA - na  Lung cancer - na  HIV - neg  HCV - neg  DM - checking  HLD - checking  Smoking - no  Depression - no, [id number]  Vaccines - counseled  Falls - no  Alcohol Screening - no    Discussed mental health, sexual health, substance use, abuse,  anticipatory guidance given.      No follow-ups on file.     Moreno Martinez MD  [facility]  Internal Medicine and Pediatrics  Phone: [phone]  Fax: [phone]

## 2023-10-21 LAB
ALBUMIN SERPL-MCNC: 4.5 G/DL (ref 3.8–4.8)
ALBUMIN/CREAT UR: <2 MG/G CREAT (ref 0–29)
ALBUMIN/GLOB SERPL: 1.9 {RATIO} (ref 1.2–2.2)
ALP SERPL-CCNC: 63 IU/L (ref 44–121)
ALT SERPL-CCNC: 18 IU/L (ref 0–44)
AST SERPL-CCNC: 23 IU/L (ref 0–40)
BASOPHILS # BLD AUTO: 0 X10E3/UL (ref 0–0.2)
BASOPHILS NFR BLD AUTO: 1 %
BILIRUB SERPL-MCNC: 0.7 MG/DL (ref 0–1.2)
BUN SERPL-MCNC: 20 MG/DL (ref 8–27)
BUN/CREAT SERPL: 17 (ref 10–24)
CALCIUM SERPL-MCNC: 9.4 MG/DL (ref 8.6–10.2)
CHLORIDE SERPL-SCNC: 105 MMOL/L (ref 96–106)
CHOLEST SERPL-MCNC: 139 MG/DL (ref 100–199)
CHOLEST/HDLC SERPL: 2.2 RATIO (ref 0–5)
CO2 SERPL-SCNC: 25 MMOL/L (ref 20–29)
CREAT SERPL-MCNC: 1.18 MG/DL (ref 0.76–1.27)
CREAT UR-MCNC: 136.8 MG/DL
EGFRCR SERPLBLD CKD-EPI 2021: 65 ML/MIN/1.73
EOSINOPHIL # BLD AUTO: 0.1 X10E3/UL (ref 0–0.4)
EOSINOPHIL NFR BLD AUTO: 1 %
ERYTHROCYTE [DISTWIDTH] IN BLOOD BY AUTOMATED COUNT: 12 % (ref 11.6–15.4)
GLOBULIN SER CALC-MCNC: 2.4 G/DL (ref 1.5–4.5)
GLUCOSE SERPL-MCNC: 133 MG/DL (ref 70–99)
HBA1C MFR BLD: 6.3 % (ref 4.8–5.6)
HCT VFR BLD AUTO: 39.9 % (ref 37.5–51)
HDLC SERPL-MCNC: 63 MG/DL
HGB BLD-MCNC: 13.4 G/DL (ref 13–17.7)
IMM GRANULOCYTES # BLD AUTO: 0 X10E3/UL (ref 0–0.1)
IMM GRANULOCYTES NFR BLD AUTO: 0 %
LDLC SERPL CALC-MCNC: 65 MG/DL (ref 0–99)
LYMPHOCYTES # BLD AUTO: 2.8 X10E3/UL (ref 0.7–3.1)
LYMPHOCYTES NFR BLD AUTO: 38 %
MCH RBC QN AUTO: 31.4 PG (ref 26.6–33)
MCHC RBC AUTO-ENTMCNC: 33.6 G/DL (ref 31.5–35.7)
MCV RBC AUTO: 93 FL (ref 79–97)
MICROALBUMIN UR-MCNC: <3 UG/ML
MONOCYTES # BLD AUTO: 0.5 X10E3/UL (ref 0.1–0.9)
MONOCYTES NFR BLD AUTO: 7 %
NEUTROPHILS # BLD AUTO: 3.9 X10E3/UL (ref 1.4–7)
NEUTROPHILS NFR BLD AUTO: 53 %
PLATELET # BLD AUTO: 165 X10E3/UL (ref 150–450)
POTASSIUM SERPL-SCNC: 4.6 MMOL/L (ref 3.5–5.2)
PROT SERPL-MCNC: 6.9 G/DL (ref 6–8.5)
RBC # BLD AUTO: 4.27 X10E6/UL (ref 4.14–5.8)
SODIUM SERPL-SCNC: 144 MMOL/L (ref 134–144)
TRIGL SERPL-MCNC: 50 MG/DL (ref 0–149)
VLDLC SERPL CALC-MCNC: 11 MG/DL (ref 5–40)
WBC # BLD AUTO: 7.3 X10E3/UL (ref 3.4–10.8)

## 2023-11-15 PROCEDURE — 93000 ELECTROCARDIOGRAM COMPLETE: CPT | Performed by: INTERNAL MEDICINE

## 2023-11-15 NOTE — PROGRESS NOTES
The ABCs of the Annual Wellness Visit  Subsequent Medicare Wellness Visit    Subjective      Rupesh Fox is a 73 y.o. male who presents for a Subsequent Medicare Wellness Visit.    The following portions of the patient's history were reviewed and   updated as appropriate: allergies, current medications, past family history, past medical history, past social history, past surgical history, and problem list.    Compared to one year ago, the patient feels his physical   health is the same.    Compared to one year ago, the patient feels his mental   health is the same.    Recent Hospitalizations:  He was not admitted to the hospital during the last year.       Current Medical Providers:  Patient Care Team:  Moreno Martinez MD as PCP - General (Internal Medicine)    Outpatient Medications Prior to Visit   Medication Sig Dispense Refill    finasteride (PROSCAR) 5 MG tablet TAKE 1 TABLET EVERY DAY 90 tablet 2    Glucos-Chondroit-Hyaluron-MSM (Glucosamine Chondroitin Joint) tablet Take  by mouth 2 (Two) Times a Day.      glucose blood test strip Use as instructed 360 each 12    lisinopril (PRINIVIL,ZESTRIL) 5 MG tablet TAKE 1 TABLET EVERY DAY 90 tablet 2    metFORMIN ER (GLUCOPHAGE-XR) 750 MG 24 hr tablet TAKE 1 TABLET EVERY DAY WITH BREAKFAST 90 tablet 2    multivitamin with minerals (MULTIVITAMIN ADULTS PO) Take 1 tablet by mouth Daily.      pantoprazole (PROTONIX) 40 MG EC tablet Take 1 tablet by mouth 2 (Two) Times a Day. 180 tablet 2    simvastatin (ZOCOR) 20 MG tablet TAKE 1 TABLET BY MOUTH EVERY NIGHT. 90 tablet 2    terazosin (HYTRIN) 10 MG capsule TAKE 1 CAPSULE BY MOUTH EVERY NIGHT. 90 capsule 2     No facility-administered medications prior to visit.       No opioid medication identified on active medication list. I have reviewed chart for other potential  high risk medication/s and harmful drug interactions in the elderly.        Aspirin is not on active medication list.  Aspirin use is not indicated  "based on review of current medical condition/s. Risk of harm outweighs potential benefits.  .    Patient Active Problem List   Diagnosis    Hx of colonic polyps    Acid reflux    Can't get food down    Gastrointestinal ulcer due to Helicobacter pylori    Gastroesophageal reflux disease with esophagitis without hemorrhage    Ann's esophagus without dysplasia    Type 2 diabetes mellitus without complication, without long-term current use of insulin    Mixed hyperlipidemia    Primary hypertension     Advance Care Planning   Advance Care Planning     Advance Directive is not on file.  ACP discussion was held with the patient during this visit. Patient does not have an advance directive, information provided.     Objective    Vitals:    10/20/23 0814   BP: 122/78   Pulse: 54   Resp: 18   SpO2: 97%   Weight: 82.6 kg (182 lb 1.6 oz)   Height: 185.4 cm (73\")     Estimated body mass index is 24.03 kg/m² as calculated from the following:    Height as of this encounter: 185.4 cm (73\").    Weight as of this encounter: 82.6 kg (182 lb 1.6 oz).    BMI is within normal parameters. No other follow-up for BMI required.      Does the patient have evidence of cognitive impairment?   No    Lab Results   Component Value Date    CHLPL 139 10/20/2023    TRIG 50 10/20/2023    HDL 63 10/20/2023    LDL 65 10/20/2023    VLDL 11 10/20/2023    HGBA1C 6.3 (H) 10/20/2023          HEALTH RISK ASSESSMENT    Smoking Status:  Social History     Tobacco Use   Smoking Status Former    Packs/day: 1.00    Years: 10.00    Additional pack years: 0.00    Total pack years: 10.00    Types: Cigarettes, Pipe, Cigars    Start date: 1965    Quit date: 1975    Years since quittin.9    Passive exposure: Past   Smokeless Tobacco Never   Tobacco Comments    Caffeine: 2 cups coffee daily     Alcohol Consumption:  Social History     Substance and Sexual Activity   Alcohol Use Yes    Alcohol/week: 1.0 standard drink of alcohol    Types: 1 Cans of beer " per week    Comment: social minimal     Fall Risk Screen:    LIANAADI Fall Risk Assessment has not been completed.    Depression Screening:      10/18/2022     8:12 AM   PHQ-2/PHQ-9 Depression Screening   Little Interest or Pleasure in Doing Things 0-->not at all   Feeling Down, Depressed or Hopeless 0-->not at all   Trouble Falling or Staying Asleep, or Sleeping Too Much 0-->not at all   Feeling Tired or Having Little Energy 0-->not at all   Poor Appetite or Overeating 0-->not at all   Feeling Bad about Yourself - or that You are a Failure or Have Let Yourself or Your Family Down 0-->not at all   Trouble Concentrating on Things, Such as Reading the Newspaper or Watching Television 0-->not at all   Moving or Speaking So Slowly that Other People Could Have Noticed? Or the Opposite - Being So Fidgety 0-->not at all   Thoughts that You Would be Better Off Dead or of Hurting Yourself in Some Way 0-->not at all   PHQ-9: Brief Depression Severity Measure Score 0   If You Checked Off Any Problems, How Difficult Have These Problems Made It For You to Do Your Work, Take Care of Things at Home, or Get Along with Other People? not difficult at all       Health Habits and Functional and Cognitive Screening:      10/20/2023     8:19 AM   Functional & Cognitive Status   Do you have difficulty preparing food and eating? No   Do you have difficulty bathing yourself, getting dressed or grooming yourself? No   Do you have difficulty using the toilet? No   Do you have difficulty moving around from place to place? No   Do you have trouble with steps or getting out of a bed or a chair? No   Current Diet Well Balanced Diet   Dental Exam Up to date   Eye Exam Up to date   Exercise (times per week) 3 times per week   Current Exercises Include Walking;Stationary Bicycling/Spin Class   Do you need help using the phone?  No   Are you deaf or do you have serious difficulty hearing?  Yes   Do you need help to go to places out of walking distance?  No   Do you need help shopping? No   Do you need help preparing meals?  No   Do you need help with housework?  No   Do you need help with laundry? No   Do you need help taking your medications? No   Do you need help managing money? No   Do you ever drive or ride in a car without wearing a seat belt? No   Have you felt unusual stress, anger or loneliness in the last month? No   Who do you live with? Spouse   If you need help, do you have trouble finding someone available to you? No   Have you been bothered in the last four weeks by sexual problems? No   Do you have difficulty concentrating, remembering or making decisions? No       Age-appropriate Screening Schedule:  Refer to the list below for future screening recommendations based on patient's age, sex and/or medical conditions. Orders for these recommended tests are listed in the plan section. The patient has been provided with a written plan.    Health Maintenance   Topic Date Due    DIABETIC EYE EXAM  04/23/2020    ZOSTER VACCINE (3 of 3) 02/24/2022    COLORECTAL CANCER SCREENING  12/06/2022    COVID-19 Vaccine (4 - 2023-24 season) 10/03/2023    HEMOGLOBIN A1C  04/20/2024    DIABETIC FOOT EXAM  07/06/2024    ANNUAL WELLNESS VISIT  10/20/2024    LIPID PANEL  10/20/2024    URINE MICROALBUMIN  10/20/2024    TDAP/TD VACCINES (2 - Td or Tdap) 01/14/2030    HEPATITIS C SCREENING  Completed    INFLUENZA VACCINE  Completed    Pneumococcal Vaccine 65+  Completed    AAA SCREEN (ONE-TIME)  Completed                  CMS Preventative Services Quick Reference  Risk Factors Identified During Encounter:    None Identified    The above risks/problems have been discussed with the patient.  Pertinent information has been shared with the patient in the After Visit Summary.    Diagnoses and all orders for this visit:    1. Well adult exam (Primary)  -     Comprehensive Metabolic Panel    2. Type 2 diabetes mellitus with hypoglycemia without coma, with long-term current use of  insulin  -     Microalbumin / Creatinine Urine Ratio - Urine, Clean Catch  -     CBC & Differential  -     Comprehensive Metabolic Panel  -     Lipid Panel With / Chol / HDL Ratio  -     Hemoglobin A1c    3. Essential hypertension  -     ECG 12 Lead    Other orders  -     Fluzone High-Dose 65+yrs ([id number])        Follow Up:   Next Medicare Wellness visit to be scheduled in 1 year.      An After Visit Summary and PPPS were made available to the patient.

## 2023-11-27 ENCOUNTER — OFFICE VISIT (OUTPATIENT)
Dept: INTERNAL MEDICINE | Facility: CLINIC | Age: 73
End: 2023-11-27
Payer: MEDICARE

## 2023-11-27 VITALS
RESPIRATION RATE: 16 BRPM | TEMPERATURE: 97.6 F | SYSTOLIC BLOOD PRESSURE: 120 MMHG | HEART RATE: 50 BPM | DIASTOLIC BLOOD PRESSURE: 72 MMHG | WEIGHT: 185 LBS | OXYGEN SATURATION: 98 % | HEIGHT: 73 IN | BODY MASS INDEX: 24.52 KG/M2

## 2023-11-27 DIAGNOSIS — E11.649 TYPE 2 DIABETES MELLITUS WITH HYPOGLYCEMIA WITHOUT COMA, WITH LONG-TERM CURRENT USE OF INSULIN: Primary | ICD-10-CM

## 2023-11-27 DIAGNOSIS — R35.0 BENIGN PROSTATIC HYPERPLASIA WITH URINARY FREQUENCY: ICD-10-CM

## 2023-11-27 DIAGNOSIS — I10 ESSENTIAL HYPERTENSION: ICD-10-CM

## 2023-11-27 DIAGNOSIS — N40.1 BENIGN PROSTATIC HYPERPLASIA WITH URINARY FREQUENCY: ICD-10-CM

## 2023-11-27 DIAGNOSIS — E78.2 MIXED HYPERLIPIDEMIA: ICD-10-CM

## 2023-11-27 DIAGNOSIS — Z79.4 TYPE 2 DIABETES MELLITUS WITH HYPOGLYCEMIA WITHOUT COMA, WITH LONG-TERM CURRENT USE OF INSULIN: Primary | ICD-10-CM

## 2023-11-27 PROCEDURE — 3074F SYST BP LT 130 MM HG: CPT | Performed by: INTERNAL MEDICINE

## 2023-11-27 PROCEDURE — 3044F HG A1C LEVEL LT 7.0%: CPT | Performed by: INTERNAL MEDICINE

## 2023-11-27 PROCEDURE — 99214 OFFICE O/P EST MOD 30 MIN: CPT | Performed by: INTERNAL MEDICINE

## 2023-11-27 PROCEDURE — 3078F DIAST BP <80 MM HG: CPT | Performed by: INTERNAL MEDICINE

## 2023-11-27 NOTE — PROGRESS NOTES
Chief Complaint  Establish Care (Previous  patient)    Subjective        Rupesh Fox presents to [facility]  History of Present Illness  Here as transition of care from Dr. Martinez, no acute concerns today  T2DM under good control on metformin and diet controlled, on lisinopril for renal protection, on simvastatin   BPH on finasteride and terazosin     Current Outpatient Medications:     finasteride (PROSCAR) 5 MG tablet, TAKE 1 TABLET EVERY DAY, Disp: 90 tablet, Rfl: 2    Glucos-Chondroit-Hyaluron-MSM (Glucosamine Chondroitin Joint) tablet, Take  by mouth 2 (Two) Times a Day., Disp: , Rfl:     glucose blood test strip, Use as instructed, Disp: 360 each, Rfl: 12    lisinopril (PRINIVIL,ZESTRIL) 5 MG tablet, TAKE 1 TABLET EVERY DAY, Disp: 90 tablet, Rfl: 2    metFORMIN ER (GLUCOPHAGE-XR) 750 MG 24 hr tablet, TAKE 1 TABLET EVERY DAY WITH BREAKFAST, Disp: 90 tablet, Rfl: 2    multivitamin with minerals (MULTIVITAMIN ADULTS PO), Take 1 tablet by mouth Daily., Disp: , Rfl:     pantoprazole (PROTONIX) 40 MG EC tablet, Take 1 tablet by mouth 2 (Two) Times a Day., Disp: 180 tablet, Rfl: 2    simvastatin (ZOCOR) 20 MG tablet, TAKE 1 TABLET BY MOUTH EVERY NIGHT., Disp: 90 tablet, Rfl: 2    terazosin (HYTRIN) 10 MG capsule, TAKE 1 CAPSULE BY MOUTH EVERY NIGHT., Disp: 90 capsule, Rfl: 2  Past Medical History:   Diagnosis Date    Allergic rhinitis     Arthritis of neck     Barretts esophagus     Benign prostatic hyperplasia without lower urinary tract symptoms     Bradycardia, unspecified     Cancer melanoma    Cellulitis and abscess of toe     Cervicalgia     Colon polyp     Deviated septum     Diabetes mellitus     Diabetic eye exam     5/14, 6/4/15 - negative, 6/16 - small change in vessel, 5/17 - negative, f/u 12 mo, 3/18 - negative, 4/19 - negative    Dysphagia     Enlarged prostate without lower urinary tract symptoms (luts)     Essential (primary) hypertension   Patient Instructions by Zulema Odonnell MD at 2019 11:00 AM     Author: Zulema Odonnell MD Service: -- Author Type: Physician    Filed: 2019 11:06 AM Encounter Date: 2019 Status: Addendum    : Zulema Odonnell MD (Physician)    Related Notes: Original Note by Zulema Odonnell MD (Physician) filed at 2019 11:06 AM         Give Paul 400 IU of vitamin D every day to help with healthy bone growth.  Patient Education   2019  Wt Readings from Last 1 Encounters:   07/22/19 13 lb 7 oz (6.095 kg) (40 %, Z= -0.26)*     * Growth percentiles are based on WHO (Girls, 0-2 years) data.       Acetaminophen Dosing Instructions  (May take every 4-6 hours)      WEIGHT   AGE Infant/Children's  160mg/5ml Children's   Chewable Tabs  80 mg each David Strength  Chewable Tabs  160 mg     Milliliter (ml) Soft Chew Tabs Chewable Tabs   6-11 lbs 0-3 months 1.25 ml     12-17 lbs 4-11 months 2.5 ml     18-23 lbs 12-23 months 3.75 ml     24-35 lbs 2-3 years 5 ml 2 tabs    36-47 lbs 4-5 years 7.5 ml 3 tabs    48-59 lbs 6-8 years 10 ml 4 tabs 2 tabs   60-71 lbs 9-10 years 12.5 ml 5 tabs 2.5 tabs   72-95 lbs 11 years 15 ml 6 tabs 3 tabs   96 lbs and over 12 years   4 tabs        Patient Education             [employer] Parent Handout   4 Month Visit  Here are some suggestions from [employer] experts that may be of value to your family.     How Your Family Is Doing    Take time for yourself.    Take time together with your partner.    Spend time alone with your other children.    Encourage your partner to help care for your baby.    Choose a mature, trained, and responsible  or caregiver.    You can talk with us about your  choices.    Hold, cuddle, talk to, and sing to your baby each day.    Massaging your infant may help your baby go to sleep more easily.    Get help if you and your partner are in conflict. Let us know. We can help.  Feeding Your Baby    Feed only breast     Fracture, femur     Fracture, foot     Gluteal tendinitis, unspecified hip     Goiter     Hearing loss     Hip arthrosis     Hyperlipidemia, unspecified     Knee swelling     Low back strain     Pain in left knee     Pain in right shoulder     Problem with sexual function     Pure hypercholesterolemia     Sprain     Sprain of hip: Sprain of other specified sites of hip and thigh    Tinnitus, unspecified ear     Trochanteric bursitis, left hip     Type 2 diabetes mellitus without complication      Past Surgical History:   Procedure Laterality Date    COLONOSCOPY      COLONOSCOPY N/A 2017    Procedure: COLONOSCOPY;  Surgeon: Rosalia James MD;  Location:  [facility] OR;  Service:     ENDOSCOPY  2015    GERD/dysphagia - erosive gastritis, normal esophagus    ENDOSCOPY N/A 2023    Procedure: ESOPHAGOGASTRODUODENOSCOPY;  Surgeon: Lico Mcmillan MD;  [place] [facility] [place] [facility];  Service: Gastroenterology;  Laterality: N/A;  Esophagitis and Gastritis    FRACTURE SURGERY Left     FX: Femur    SEPTOPLASTY      SINUS SURGERY      TONSILLECTOMY       Family History   Problem Relation Age of Onset    Hypertension Mother     Stroke Mother     Stroke Father     Heart failure Father     Benign prostatic hyperplasia Father     Colon cancer Neg Hx     Colon polyps Neg Hx      Social History     Socioeconomic History    Marital status:    Tobacco Use    Smoking status: Former     Packs/day: 1.00     Years: 10.00     Additional pack years: 0.00     Total pack years: 10.00     Types: Cigarettes, Pipe, Cigars     Start date: 1965     Quit date: 1975     Years since quittin.9     Passive exposure: Past    Smokeless tobacco: Never    Tobacco comments:     Caffeine: 2 cups coffee daily   Vaping Use    Vaping Use: Never used   Substance and Sexual Activity    Alcohol use: Yes     Alcohol/week: 1.0 standard drink of alcohol     Types: 1 Cans of beer per week     Comment: social minimal     milk or iron-fortified formula in the first 4-6 months.  If Breastfeeding    If you are still breastfeeding, thats great!    Plan for pumping and storage of breast milk.   If Formula Feeding    Make sure to prepare, heat, and store the formula safely.    Hold your baby so you can look at each other while feeding.    Do not prop the bottle.    Do not give your baby a bottle in the crib.   Solid Food    You may begin to feed your baby solid food when your baby is ready.    Some of the signs your baby is ready for solids    Opens mouth for the spoon.    Sits with support.    Good head and neck control.    Interest in foods you eat.    Avoid foods that cause allergy--peanuts, tree nuts, fish, and shellfish.    Avoid feeding your baby too much by following the babys signs of fullness   Leaning back    Turning away    Ask us about programs like WIC that can help get food for you if you are breastfeeding and formula for your baby if you are formula feeding.  Safety    Use a rear-facing car safety seat in the back seat in all vehicles.    Always wear a seat belt and never drive after using alcohol or drugs.    Keep small objects and plastic bags away from your baby.    Keep a hand on your baby on any high surface from which she can fall and be hurt.    Prevent burns by setting your water heater so the temperature at the faucet is 120 F or lower.    Do not drink hot drinks when holding your baby.    Never leave your baby alone in bathwater, even in a bath seat or ring.    The kitchen is the most dangerous room. Dont let your baby crawl around there; use a playpen or high chair instead.    Do not use a baby walker.  Your Changing Baby    Keep routines for feeding, nap time, and bedtime.  Crib/Playpen    Put your baby to sleep on her back.    In a crib that meets current safety standards, with no drop-side rail and slats no more than 2 3/8 inches apart. Find more information on the Consumer Product Safety Commission Web site  "Drug use: Never    Sexual activity: Yes        reviewed and updated    Objective   Vital Signs:  /72   Pulse 50   Temp 97.6 °F (36.4 °C)   Resp 16   Ht 185.4 cm (73\")   Wt 83.9 kg (185 lb)   SpO2 98%   BMI 24.41 kg/m²   Estimated body mass index is 24.41 kg/m² as calculated from the following:    Height as of this encounter: 185.4 cm (73\").    Weight as of this encounter: 83.9 kg (185 lb).    BMI is within normal parameters. No other follow-up for BMI required.      Physical Exam  Vitals and nursing note reviewed.   Constitutional:       General: He is not in acute distress.     Appearance: Normal appearance. He is not toxic-appearing.   HENT:      Head: Normocephalic and atraumatic.      Right Ear: External ear normal.      Left Ear: External ear normal.      Nose: Nose normal.   Eyes:      General: No scleral icterus.        Right eye: No discharge.         Left eye: No discharge.      Extraocular Movements: Extraocular movements intact.      Conjunctiva/sclera: Conjunctivae normal.      Pupils: Pupils are equal, round, and reactive to light.   Cardiovascular:      Rate and Rhythm: Normal rate and regular rhythm.      Pulses: Normal pulses.      Heart sounds: Normal heart sounds. No murmur heard.  Pulmonary:      Effort: Pulmonary effort is normal.   Musculoskeletal:         General: Normal range of motion.   Skin:     General: Skin is warm.      Capillary Refill: Capillary refill takes less than 2 seconds.   Neurological:      General: No focal deficit present.      Mental Status: He is alert and oriented to person, place, and time. Mental status is at baseline.      Cranial Nerves: No cranial nerve deficit.      Gait: Gait normal.   Psychiatric:         Mood and Affect: Mood normal.         Behavior: Behavior normal.         Thought Content: Thought content normal.         Judgment: Judgment normal.        Result Review :  The following data was reviewed by: Leland Carson MD on 11/27/2023:  Common " at www.[facility].gov.  If your crib has a drop-side rail, keep it up and locked at all times. Contact the crib company to see if there is a device to keep the drop-side rail from falling down   Keep soft objects and loose bedding such as comforters, pillows, bumper pads, and toys out of the crib.    Lower your babys mattress.    If using a mesh playpen, make sure the openings are less than 1/4 inch apart. Playtime    Learn what things your baby likes and does not like.    Encourage active play.    Offer mirrors, floor gyms, and colorful toys to hold.    Tummy time--put your baby on his tummy when awake and you can watch.    Promote quiet play.    Hold and talk with your baby.    Read to your baby often. Crying    Give your baby a pacifier or his fingers or thumb to suck when crying.  Healthy Teeth    Go to your own dentist twice yearly. It is important to keep your teeth healthy so that you dont pass bacteria that causes tooth decay on to your baby.    Do not share spoons or cups with your baby or use your mouth to clean the babys pacifier.    Use a cold teething ring if your baby has sore gums with teething.  What to Expect at Your Babys 6 Month Visit  We will talk about    Introducing solid food    Getting help with your baby    Home and car safety    Brushing your babys teeth    Reading to and teaching your baby  _______________________________________  Poison Help: 1-807.167.3981  Child safety seat inspection: 4-025-RMSENUZHX; seatcheck.org              labs          7/6/2023    08:34 8/10/2023    10:24 10/20/2023    09:43   Common Labs   Glucose 138  139  133    BUN 30  22  20    Creatinine 1.30  1.23  1.18    Sodium 144  143  144    Potassium 4.7  4.9  4.6    Chloride 107  109  105    Calcium 10.0  9.5  9.4    Total Protein 6.9   6.9    Albumin 4.8   4.5    Total Bilirubin 0.7   0.7    Alkaline Phosphatase 57   63    AST (SGOT) 20   23    ALT (SGPT) 18   18    WBC   7.3    Hemoglobin   13.4    Hematocrit   39.9    Platelets   165    Total Cholesterol 151   139    Triglycerides 44   50    HDL Cholesterol 61   63    LDL Cholesterol  80   65    Hemoglobin A1C 6.30   6.3    Microalbumin, Urine 7.9   <3.0      Data reviewed : prior office notes reviewed           Assessment and Plan   Diagnoses and all orders for this visit:    1. Type 2 diabetes mellitus with hypoglycemia without coma, with long-term current use of insulin (Primary)  - controlled with diet and metformin, A1C <7 last check in Oct 2023    2. Essential hypertension  - controlled on lisinopril    3. Mixed hyperlipidemia  - controlled on simvastatin    4. Benign prostatic hyperplasia with urinary frequency  - controlled on terazosin and finasteride         I spent 20 minutes caring for Rupesh on this date of service. This time includes time spent by me in the following activities:preparing for the visit, reviewing tests, obtaining and/or reviewing a separately obtained history, performing a medically appropriate examination and/or evaluation , counseling and educating the patient/family/caregiver, ordering medications, tests, or procedures, and documenting information in the medical record  Follow Up   Return in about 3 months (around 2/27/2024) for Recheck.  Patient was given instructions and counseling regarding his condition or for health maintenance advice. Please see specific information pulled into the AVS if appropriate.     Leland Carson MD  [facility]

## 2023-11-30 ENCOUNTER — OFFICE VISIT (OUTPATIENT)
Dept: ORTHOPEDIC SURGERY | Facility: CLINIC | Age: 73
End: 2023-11-30
Payer: MEDICARE

## 2023-11-30 VITALS — BODY MASS INDEX: 24.52 KG/M2 | HEIGHT: 73 IN | WEIGHT: 185 LBS

## 2023-11-30 DIAGNOSIS — M17.12 PRIMARY OSTEOARTHRITIS OF LEFT KNEE: Primary | ICD-10-CM

## 2023-11-30 RX ADMIN — TRIAMCINOLONE ACETONIDE 80 MG: 40 INJECTION, SUSPENSION INTRA-ARTICULAR; INTRAMUSCULAR at 09:07

## 2023-11-30 RX ADMIN — LIDOCAINE HYDROCHLORIDE 8 ML: 10 INJECTION, SOLUTION EPIDURAL; INFILTRATION; INTRACAUDAL; PERINEURAL at 09:07

## 2023-11-30 NOTE — PROGRESS NOTES
Subjective:     Patient ID: Rupesh Fox is a 73 y.o. male.    Chief Complaint:  Follow-up left knee pain, DJD  Last injection-viscosupplement-2023    History of Present Illness  Rupesh Fox returns to clinic today for evaluation of left knee pain.    The patient got minimal to no improvement with his viscosupplement injection he had back in 2023. He did have an issue with exacerbation of pain to his left hip. He feels like that may have caused some limitation for him and regards to his ability to really be able to rehab and activate that. He rates his current level of pain as moderate in intensity, aching in nature, localized primarily to the medial aspect of his left knee, worse with prolonged walking, weightbearing, deep flexion activities, especially with walking with weighted objects up to 50 pounds. He denies any numbness or paresthesia. He denies any fever, chills, or sweats. He denies radiation of his pain. His hip pain is minimal now at this time.     Social History     Occupational History    Occupation: retired   Tobacco Use    Smoking status: Former     Packs/day: 1.00     Years: 10.00     Additional pack years: 0.00     Total pack years: 10.00     Types: Cigarettes, Pipe, Cigars     Start date: 1965     Quit date: 1975     Years since quittin.9     Passive exposure: Past    Smokeless tobacco: Never    Tobacco comments:     Caffeine: 2 cups coffee daily   Vaping Use    Vaping Use: Never used   Substance and Sexual Activity    Alcohol use: Yes     Alcohol/week: 1.0 standard drink of alcohol     Types: 1 Cans of beer per week     Comment: social minimal    Drug use: Never    Sexual activity: Yes      Past Medical History:   Diagnosis Date    Allergic rhinitis     Arthritis of neck     Barretts esophagus     Benign prostatic hyperplasia without lower urinary tract symptoms     Bradycardia, unspecified     Cancer melanoma    Cellulitis and abscess of toe     Cervicalgia     Colon  "polyp     Deviated septum     Diabetes mellitus     Diabetic eye exam     5/14, 6/4/15 - negative, 6/16 - small change in vessel, 5/17 - negative, f/u 12 mo, 3/18 - negative, 4/19 - negative    Dysphagia     Enlarged prostate without lower urinary tract symptoms (luts)     Essential (primary) hypertension     Fracture, femur     Fracture, foot 1972    Gluteal tendinitis, unspecified hip     Goiter     Hearing loss     Hip arthrosis     Hyperlipidemia, unspecified     Knee swelling     Low back strain     Pain in left knee     Pain in right shoulder     Problem with sexual function     Pure hypercholesterolemia     Sprain     Sprain of hip: Sprain of other specified sites of hip and thigh    Tinnitus, unspecified ear     Trochanteric bursitis, left hip     Type 2 diabetes mellitus without complication      Past Surgical History:   Procedure Laterality Date    COLONOSCOPY      COLONOSCOPY N/A 12/06/2017    Procedure: COLONOSCOPY;  Surgeon: Rosalia James MD;  Location: [facility] OR;  Service:     ENDOSCOPY  05/2015    GERD/dysphagia - erosive gastritis, normal esophagus    ENDOSCOPY N/A 02/07/2023    Procedure: ESOPHAGOGASTRODUODENOSCOPY;  Surgeon: Lico Mcmillan MD;  [place] [facility] [place];  Service: Gastroenterology;  Laterality: N/A;  Esophagitis and Gastritis    FRACTURE SURGERY Left 1972    FX: Femur    SEPTOPLASTY      SINUS SURGERY      TONSILLECTOMY         Family History   Problem Relation Age of Onset    Hypertension Mother     Stroke Mother     Stroke Father     Heart failure Father     Benign prostatic hyperplasia Father     Colon cancer Neg Hx     Colon polyps Neg Hx          Review of Systems        Objective:  Vitals:    11/30/23 0838   Weight: 83.9 kg (185 lb)   Height: 185.4 cm (73\")         11/30/23  0838   Weight: 83.9 kg (185 lb)     Body mass index is 24.41 kg/m².  General: No acute distress.  Resp: normal respiratory effort  Skin: no rashes or wounds; normal turgor  Psych: mood and " affect appropriate; recent and remote memory intact        Ortho Exam     Left Knee-    ROM 2 to 130 degrees  4+/5 on flexion  4+/5 on extension  Maximal tenderness to palpation medial joint line  Moderate tenderness medial and lateral patellar facet  Maurice's exam- Positive with pain along the medial joint line, no click  Effusion- None  Posterior drawer- Good endpoint at 90 degrees  Stable opening on varus and valgus stress at 2 and 30 degrees  Active patellar compression test- Positive      Imaging:  None today  Assessment:        1. Primary osteoarthritis of left knee           Plan:  - Large Joint Arthrocentesis: L knee on 11/30/2023 9:07 AM  Indications: pain  Details: 22 G needle, anterolateral approach  Medications: 80 mg triamcinolone acetonide 40 MG/ML; 8 mL lidocaine PF 1% 1 %  Outcome: tolerated well, no immediate complications  Procedure, treatment alternatives, risks and benefits explained, specific risks discussed. Consent was given by the patient. Immediately prior to procedure a time out was called to verify the correct patient, procedure, equipment, support staff and site/side marked as required. Patient was prepped and draped in the usual sterile fashion.             1. I discussed treatment options at length with patient at today's visit.    2. At this point in time, he would like to proceed with repeat injection today. We did discuss option for total knee arthroplasty, but he would like to hold off on that at this time.    3. Patient would like to proceed with cortisone injection today to the left knee. Recommended limited use of affected extremity for the next 24 hours to only essential activities other than work on general active and passive motion. Recommended supplementing with ice and soft tissue massage. Discussed with patient that they should see results in 5-7 days, if no improvement in 5-6 weeks I have asked them to call the office to review other options. Patient should call office  immediately if they notice redness, warmth, fevers, chills, or residual numbness or tingling for greater than 6 hours after injection.    4. He is to continue with hip, core, and quad strengthening as tolerated.    5. He will follow up as needed.    Rupesh Fox was in agreement with plan and had all questions answered.     Orders:  Orders Placed This Encounter   Procedures    - Large Joint Arthrocentesis: L knee       Medications:  No orders of the defined types were placed in this encounter.      Followup:  Return if symptoms worsen or fail to improve.    Diagnoses and all orders for this visit:    1. Primary osteoarthritis of left knee (Primary)    Other orders  -     - Large Joint Arthrocentesis: L knee          Dictated utilizing Dragon dictation     Transcribed from ambient dictation for Sergo Agustin MD by Coleen Townsend.  11/30/23   09:44 EST    Patient or patient representative verbalized consent to the visit recording.  I have personally performed the services described in this document as transcribed by the above individual, and it is both accurate and complete.

## 2023-12-17 RX ORDER — TRIAMCINOLONE ACETONIDE 40 MG/ML
80 INJECTION, SUSPENSION INTRA-ARTICULAR; INTRAMUSCULAR
Status: COMPLETED | OUTPATIENT
Start: 2023-11-30 | End: 2023-11-30

## 2023-12-17 RX ORDER — LIDOCAINE HYDROCHLORIDE 10 MG/ML
8 INJECTION, SOLUTION EPIDURAL; INFILTRATION; INTRACAUDAL; PERINEURAL
Status: COMPLETED | OUTPATIENT
Start: 2023-11-30 | End: 2023-11-30

## 2024-01-07 DIAGNOSIS — K21.9 LARYNGOPHARYNGEAL REFLUX (LPR): ICD-10-CM

## 2024-01-08 RX ORDER — PANTOPRAZOLE SODIUM 40 MG/1
40 TABLET, DELAYED RELEASE ORAL 2 TIMES DAILY
Qty: 180 TABLET | Refills: 0 | Status: SHIPPED | OUTPATIENT
Start: 2024-01-08

## 2024-01-18 ENCOUNTER — TELEPHONE (OUTPATIENT)
Dept: INTERNAL MEDICINE | Facility: CLINIC | Age: 74
End: 2024-01-18
Payer: MEDICARE

## 2024-01-18 NOTE — TELEPHONE ENCOUNTER
Okay for hub to read*  Called and left message to reschedule appointment on 02/15/2024 @ 9:45 am. Due to Dr. Carson being out of the office that day.

## 2024-02-28 ENCOUNTER — OFFICE VISIT (OUTPATIENT)
Dept: INTERNAL MEDICINE | Facility: CLINIC | Age: 74
End: 2024-02-28
Payer: MEDICARE

## 2024-02-28 VITALS
BODY MASS INDEX: 24.06 KG/M2 | DIASTOLIC BLOOD PRESSURE: 72 MMHG | SYSTOLIC BLOOD PRESSURE: 120 MMHG | HEIGHT: 73 IN | OXYGEN SATURATION: 97 % | HEART RATE: 51 BPM | RESPIRATION RATE: 16 BRPM | WEIGHT: 181.5 LBS

## 2024-02-28 DIAGNOSIS — E11.9 TYPE 2 DIABETES MELLITUS WITHOUT COMPLICATION, WITHOUT LONG-TERM CURRENT USE OF INSULIN: Primary | ICD-10-CM

## 2024-02-28 DIAGNOSIS — E78.2 MIXED HYPERLIPIDEMIA: ICD-10-CM

## 2024-02-28 LAB
ALBUMIN SERPL-MCNC: 4.5 G/DL (ref 3.5–5.2)
ALBUMIN/GLOB SERPL: 2.3 G/DL
ALP SERPL-CCNC: 66 U/L (ref 39–117)
ALT SERPL-CCNC: 16 U/L (ref 1–41)
AST SERPL-CCNC: 21 U/L (ref 1–40)
BILIRUB SERPL-MCNC: 0.8 MG/DL (ref 0–1.2)
BUN SERPL-MCNC: 20 MG/DL (ref 8–23)
BUN/CREAT SERPL: 17.1 (ref 7–25)
CALCIUM SERPL-MCNC: 9.6 MG/DL (ref 8.6–10.5)
CHLORIDE SERPL-SCNC: 106 MMOL/L (ref 98–107)
CHOLEST SERPL-MCNC: 135 MG/DL (ref 0–200)
CO2 SERPL-SCNC: 24.5 MMOL/L (ref 22–29)
CREAT SERPL-MCNC: 1.17 MG/DL (ref 0.76–1.27)
EGFRCR SERPLBLD CKD-EPI 2021: 65.4 ML/MIN/1.73
GLOBULIN SER CALC-MCNC: 2 GM/DL
GLUCOSE SERPL-MCNC: 128 MG/DL (ref 65–99)
HBA1C MFR BLD: 6.3 % (ref 4.8–5.6)
HDLC SERPL-MCNC: 63 MG/DL (ref 40–60)
LDLC SERPL CALC-MCNC: 59 MG/DL (ref 0–100)
POTASSIUM SERPL-SCNC: 5.3 MMOL/L (ref 3.5–5.2)
PROT SERPL-MCNC: 6.5 G/DL (ref 6–8.5)
SODIUM SERPL-SCNC: 142 MMOL/L (ref 136–145)
TRIGL SERPL-MCNC: 60 MG/DL (ref 0–150)
VLDLC SERPL CALC-MCNC: 13 MG/DL (ref 5–40)

## 2024-02-28 NOTE — PROGRESS NOTES
"Chief Complaint  Diabetes (3 mo f/u)    Subjective        Rupesh Fox presents to [facility]  History of Present Illness  Here for follow up t2dm, hld  Taking all meds as prescribed. No acute issues today      Objective   Vital Signs:  /72   Pulse 51   Resp 16   Ht 185.4 cm (73\")   Wt 82.3 kg (181 lb 8 oz)   SpO2 97%   BMI 23.95 kg/m²   Estimated body mass index is 23.95 kg/m² as calculated from the following:    Height as of this encounter: 185.4 cm (73\").    Weight as of this encounter: 82.3 kg (181 lb 8 oz).       BMI is within normal parameters. No other follow-up for BMI required.      Physical Exam  Vitals and nursing note reviewed.   Constitutional:       General: He is not in acute distress.     Appearance: Normal appearance. He is not toxic-appearing.   HENT:      Head: Normocephalic and atraumatic.      Right Ear: External ear normal.      Left Ear: External ear normal.      Nose: Nose normal.   Eyes:      General: No scleral icterus.        Right eye: No discharge.         Left eye: No discharge.      Extraocular Movements: Extraocular movements intact.      Conjunctiva/sclera: Conjunctivae normal.      Pupils: Pupils are equal, round, and reactive to light.   Cardiovascular:      Rate and Rhythm: Normal rate and regular rhythm.      Pulses: Normal pulses.      Heart sounds: Normal heart sounds. No murmur heard.  Pulmonary:      Effort: Pulmonary effort is normal.   Musculoskeletal:         General: Normal range of motion.   Skin:     General: Skin is warm.      Capillary Refill: Capillary refill takes less than 2 seconds.   Neurological:      General: No focal deficit present.      Mental Status: He is alert and oriented to person, place, and time. Mental status is at baseline.      Cranial Nerves: No cranial nerve deficit.      Gait: Gait normal.   Psychiatric:         Mood and Affect: Mood normal.         Behavior: Behavior normal.         " Thought Content: Thought content normal.         Judgment: Judgment normal.        Result Review :    The following data was reviewed by: Leland Carson MD on 02/28/2024:  Common labs          7/6/2023    08:34 8/10/2023    10:24 10/20/2023    09:43   Common Labs   Glucose 138  139  133    BUN 30  22  20    Creatinine 1.30  1.23  1.18    Sodium 144  143  144    Potassium 4.7  4.9  4.6    Chloride 107  109  105    Calcium 10.0  9.5  9.4    Total Protein 6.9   6.9    Albumin 4.8   4.5    Total Bilirubin 0.7   0.7    Alkaline Phosphatase 57   63    AST (SGOT) 20   23    ALT (SGPT) 18   18    WBC   7.3    Hemoglobin   13.4    Hematocrit   39.9    Platelets   165    Total Cholesterol 151   139    Triglycerides 44   50    HDL Cholesterol 61   63    LDL Cholesterol  80   65    Hemoglobin A1C 6.30   6.3    Microalbumin, Urine 7.9   <3.0      Data reviewed : prior office notes reviewed             Assessment and Plan     Diagnoses and all orders for this visit:    1. Type 2 diabetes mellitus without complication, without long-term current use of insulin (Primary)  -     Hemoglobin A1c  -     Comprehensive metabolic panel  - controlled, continue metformin ER 750mg daily   - on ACEi and statin     2. Mixed hyperlipidemia  -     Comprehensive metabolic panel  -     Lipid panel  - controlled continue simvastatin 20mg daily         I spent 20 minutes caring for Rupesh on this date of service. This time includes time spent by me in the following activities:preparing for the visit, reviewing tests, obtaining and/or reviewing a separately obtained history, performing a medically appropriate examination and/or evaluation , counseling and educating the patient/family/caregiver, ordering medications, tests, or procedures, and documenting information in the medical record  Follow Up     Return in about 3 months (around 5/28/2024) for Recheck.  Patient was given instructions and counseling regarding his condition or for health maintenance  advice. Please see specific information pulled into the AVS if appropriate.     Leland Carson MD  [facility] [place] [facility]  [address]  Phone: [phone]

## 2024-03-07 ENCOUNTER — OFFICE VISIT (OUTPATIENT)
Dept: GASTROENTEROLOGY | Facility: CLINIC | Age: 74
End: 2024-03-07
Payer: MEDICARE

## 2024-03-07 VITALS
DIASTOLIC BLOOD PRESSURE: 62 MMHG | HEIGHT: 73 IN | WEIGHT: 183.9 LBS | SYSTOLIC BLOOD PRESSURE: 106 MMHG | BODY MASS INDEX: 24.37 KG/M2

## 2024-03-07 DIAGNOSIS — K21.00 GASTROESOPHAGEAL REFLUX DISEASE WITH ESOPHAGITIS WITHOUT HEMORRHAGE: ICD-10-CM

## 2024-03-07 DIAGNOSIS — K21.9 LARYNGOPHARYNGEAL REFLUX (LPR): ICD-10-CM

## 2024-03-07 DIAGNOSIS — K22.70 BARRETT'S ESOPHAGUS WITHOUT DYSPLASIA: Primary | ICD-10-CM

## 2024-03-07 RX ORDER — VONOPRAZAN FUMARATE 26.72 MG/1
20 TABLET ORAL DAILY
Qty: 30 TABLET | Refills: 1 | Status: SHIPPED | OUTPATIENT
Start: 2024-03-07

## 2024-03-07 RX ORDER — VONOPRAZAN FUMARATE 13.36 MG/1
10 TABLET ORAL DAILY
Qty: 30 TABLET | Refills: 2 | Status: SHIPPED | OUTPATIENT
Start: 2024-03-07

## 2024-03-07 RX ORDER — PANTOPRAZOLE SODIUM 40 MG/1
40 TABLET, DELAYED RELEASE ORAL 2 TIMES DAILY
Qty: 180 TABLET | Refills: 0 | Status: CANCELLED | OUTPATIENT
Start: 2024-03-07

## 2024-03-07 NOTE — PROGRESS NOTES
PATIENT INFORMATION  Rupesh Fox       - 1950    CHIEF COMPLAINT  Chief Complaint   Patient presents with    Ann's Esophagus     Heartburn       HISTORY OF PRESENT ILLNESS    Here today for annual barretts follow-up    Managed on BID pantoprazole. No odynophagia, dysphagia, nausea, vomiting, abdominal pain, bloating, belching. Rare breakthrough symptoms. No NSAIDs or OTC antacids. Very seldom has dysphagia, no stuck foods, still having nasal drainage and tightness from constantly clearing throat. Hoarseness is still there and belching a lot in the morning (improved with tums) when doing sit ups and passing a lot of gas. No taste. Drainage for 15-20 min after every meal. Reviewed elevating HOB and avoiding eating late.    Saw ENT in past was told had deviated septum and large turbinates, surgery last year and no improvement, worse if anything. Then saw allergist, testing negative.     Bowels are moving daily, easy and well controlled, gas comes and goes, more frequent in the mornings. Improves after BM in morning. Reviewed fiber, water, probiotic recommendations. Is eating a high fiber diet.    2023 for reflux esophagitis, positive for gastritis, reflux with barretts, negative for HP, dysplasia.    Last colon 2017 normal with hemorrhoids. In  had 7 adenomas removed. One in between not sure whether there polyps. Will  plan to repeat colonoscopy this year/      Heartburn  He reports no abdominal pain or no nausea.       REVIEWED PERTINENT RESULTS/ LABS  Lab Results   Component Value Date    CASEREPORT  2023     Surgical Pathology Report                         Case: [id number]                                  Authorizing Provider:  Lico Mcmillan        Collected:           2023 02:03 PM                                 MD Saqib                                                                   Ordering Location:     [facility]     Received:             02/07/2023 02:36 PM                                 [facility] [place] [facility] OR                                                     Pathologist:           Gallo Zhang MD                                                         Specimens:   1) - Gastric, Antrum, Gastric biopsy                                                                2) - Esophagus, Distal, distal Esophagus biopsy                                            FINALDX  02/07/2023     1. Stomach, Biopsy: Benign gastric mucosa with    A. Mild chronic inflammation.   B. Intestinal metaplasia with no dysplasia.   C. No Helicobacter pylori.    D. Reactive changes of the epithelium.    2. Esophagus, Distal, Biopsy: Benign squamous and gastric mucosa with   A. Intestinal metaplasia consistent with Ann's esophagus with reactive changes and no dysplasia.   B. Extensive chronic inflammation of the gastric mucosa.   C. No Helicobacter pylori.    jose a/pkm        Lab Results   Component Value Date    HGB 13.4 10/20/2023    MCV 93 10/20/2023     10/20/2023    ALT 16 02/28/2024    AST 21 02/28/2024    HGBA1C 6.30 (H) 02/28/2024    TRIG 60 02/28/2024      No results found.    REVIEW OF SYSTEMS  Review of Systems   Constitutional: Negative.    HENT:  Positive for congestion (Chronic) and trouble swallowing (Occassionally).    Eyes: Negative.    Respiratory: Negative.     Cardiovascular: Negative.    Gastrointestinal:  Negative for abdominal pain, constipation, diarrhea, nausea and vomiting.        GERD, Ann's    Endocrine: Negative.    Genitourinary: Negative.    Musculoskeletal: Negative.    Skin: Negative.    Allergic/Immunologic: Negative.    Neurological: Negative.    Hematological: Negative.    Psychiatric/Behavioral: Negative.           ACTIVE PROBLEMS  Patient Active Problem List    Diagnosis     Type 2 diabetes mellitus without complication, without long-term current use of insulin [E11.9]     Mixed hyperlipidemia [E78.2]     Primary  hypertension [I10]     Gastroesophageal reflux disease with esophagitis without hemorrhage [K21.00]     Ann's esophagus without dysplasia [K22.70]     Acid reflux [K21.9]     Can't get food down [ALE0054]     Gastrointestinal ulcer due to Helicobacter pylori [K28.9, B96.81]     Hx of colonic polyps [Z86.010]          PAST MEDICAL HISTORY  Past Medical History:   Diagnosis Date    Allergic rhinitis     Arthritis of neck     Ann esophagus Feb 2023    Barretts esophagus     Benign prostatic hyperplasia without lower urinary tract symptoms     Bradycardia, unspecified     Cancer melanoma    Cellulitis and abscess of toe     Cervicalgia     Colon polyp     Deviated septum     Diabetes mellitus     Diabetic eye exam     5/14, 6/4/15 - negative, 6/16 - small change in vessel, 5/17 - negative, f/u 12 mo, 3/18 - negative, 4/19 - negative    Dysphagia     Enlarged prostate without lower urinary tract symptoms (luts)     Essential (primary) hypertension     Fracture, femur     Fracture, foot 1972    GERD (gastroesophageal reflux disease)     Gluteal tendinitis, unspecified hip     Goiter     Hearing loss     Hip arthrosis     Hyperlipidemia, unspecified     Knee swelling     Low back strain     Pain in left knee     Pain in right shoulder     Problem with sexual function     Pure hypercholesterolemia     Sprain     Sprain of hip: Sprain of other specified sites of hip and thigh    Tinnitus, unspecified ear     Trochanteric bursitis, left hip     Type 2 diabetes mellitus without complication          SURGICAL HISTORY  Past Surgical History:   Procedure Laterality Date    COLONOSCOPY      COLONOSCOPY N/A 12/06/2017    Procedure: COLONOSCOPY;  Surgeon: Rosalia James MD;  Location: [facility] OR;  Service:     ENDOSCOPY  05/2015    GERD/dysphagia - erosive gastritis, normal esophagus    ENDOSCOPY N/A 02/07/2023    Procedure: ESOPHAGOGASTRODUODENOSCOPY;  Surgeon: Lico Mcmillan MD;  Location: [facility] [place] [facility];   Service: Gastroenterology;  Laterality: N/A;  Esophagitis and Gastritis    FLEXIBLE SIGMOIDOSCOPY      FRACTURE SURGERY Left     FX: Femur    LIVER BIOPSY      SEPTOPLASTY      SINUS SURGERY      TONSILLECTOMY      UPPER GASTROINTESTINAL ENDOSCOPY           FAMILY HISTORY  Family History   Problem Relation Age of Onset    Hypertension Mother     Stroke Mother     Stroke Father     Heart failure Father     Benign prostatic hyperplasia Father     Colon cancer Neg Hx     Colon polyps Neg Hx          SOCIAL HISTORY  Social History     Occupational History    Occupation: retired   Tobacco Use    Smoking status: Former     Current packs/day: 0.00     Average packs/day: 1 pack/day for 10.0 years (10.0 ttl pk-yrs)     Types: Cigarettes, Pipe, Cigars     Start date: 1965     Quit date: 1975     Years since quittin.2     Passive exposure: Past    Smokeless tobacco: Never    Tobacco comments:     Caffeine: 2 cups coffee daily   Vaping Use    Vaping status: Never Used   Substance and Sexual Activity    Alcohol use: Yes     Alcohol/week: 1.0 standard drink of alcohol     Types: 1 Cans of beer per week     Comment: social minimal    Drug use: Never    Sexual activity: Yes         CURRENT MEDICATIONS    Current Outpatient Medications:     finasteride (PROSCAR) 5 MG tablet, TAKE 1 TABLET EVERY DAY, Disp: 90 tablet, Rfl: 2    Glucos-Chondroit-Hyaluron-MSM (Glucosamine Chondroitin Joint) tablet, Take  by mouth 2 (Two) Times a Day., Disp: , Rfl:     glucose blood test strip, Use as instructed, Disp: 360 each, Rfl: 12    lisinopril (PRINIVIL,ZESTRIL) 5 MG tablet, TAKE 1 TABLET EVERY DAY, Disp: 90 tablet, Rfl: 2    metFORMIN ER (GLUCOPHAGE-XR) 750 MG 24 hr tablet, TAKE 1 TABLET EVERY DAY WITH BREAKFAST, Disp: 90 tablet, Rfl: 2    multivitamin with minerals (MULTIVITAMIN ADULTS PO), Take 1 tablet by mouth Daily., Disp: , Rfl:     simvastatin (ZOCOR) 20 MG tablet, TAKE 1 TABLET BY MOUTH EVERY NIGHT., Disp: 90 tablet,  "Rfl: 2    terazosin (HYTRIN) 10 MG capsule, TAKE 1 CAPSULE BY MOUTH EVERY NIGHT., Disp: 90 capsule, Rfl: 2    Vonoprazan Fumarate (Voquezna) 10 MG tablet, Take 1 tablet by mouth Daily., Disp: 30 tablet, Rfl: 2    Vonoprazan Fumarate (Voquezna) 20 MG tablet, Take 1 tablet by mouth Daily., Disp: 30 tablet, Rfl: 1    ALLERGIES  Patient has no known allergies.    VITALS  Vitals:    03/07/24 0850   BP: 106/62   BP Location: Left arm   Patient Position: Sitting   Cuff Size: Large Adult   Weight: 83.4 kg (183 lb 14.4 oz)   Height: 185.4 cm (72.99\")       PHYSICAL EXAM  Debilities/Disabilities Identified: None  Emotional Behavior: Appropriate  Wt Readings from Last 3 Encounters:   03/07/24 83.4 kg (183 lb 14.4 oz)   02/28/24 82.3 kg (181 lb 8 oz)   11/30/23 83.9 kg (185 lb)     Ht Readings from Last 1 Encounters:   03/07/24 185.4 cm (72.99\")     Body mass index is 24.27 kg/m².  Physical Exam  Constitutional:       General: He is not in acute distress.     Appearance: Normal appearance. He is not ill-appearing.   HENT:      Head: Normocephalic and atraumatic.      Mouth/Throat:      Mouth: Mucous membranes are moist.      Pharynx: No posterior oropharyngeal erythema.   Eyes:      General: No scleral icterus.  Cardiovascular:      Rate and Rhythm: Normal rate and regular rhythm.      Heart sounds: Normal heart sounds.   Pulmonary:      Effort: Pulmonary effort is normal.      Breath sounds: Normal breath sounds.   Abdominal:      General: Abdomen is flat. Bowel sounds are normal. There is no distension.      Palpations: Abdomen is soft. There is no mass.      Tenderness: There is no abdominal tenderness. There is no guarding or rebound. Negative signs include Pozo's sign.      Hernia: No hernia is present.   Musculoskeletal:      Cervical back: Neck supple.   Skin:     General: Skin is warm.      Capillary Refill: Capillary refill takes less than 2 seconds.   Neurological:      General: No focal deficit present.      Mental " Status: He is alert and oriented to person, place, and time.   Psychiatric:         Mood and Affect: Mood normal.         Behavior: Behavior normal.         Thought Content: Thought content normal.         Judgment: Judgment normal.         CLINICAL DATA REVIEWED   reviewed previous lab results and integrated with today's visit, reviewed notes from other physicians and/or last GI encounter, reviewed previous endoscopy results and available photos, reviewed surgical pathology results from previous biopsies    ASSESSMENT  Diagnoses and all orders for this visit:    Ann's esophagus without dysplasia    Laryngopharyngeal reflux (LPR)    Gastroesophageal reflux disease with esophagitis without hemorrhage  -     Vonoprazan Fumarate (Voquezna) 20 MG tablet; Take 1 tablet by mouth Daily.  -     Vonoprazan Fumarate (Voquezna) 10 MG tablet; Take 1 tablet by mouth Daily.          PLAN    Will try voquezna, elevate HOB  Colon in 12/2024    Return in about 1 year (around 3/7/2025).    I have discussed the above plan with the patient.  They verbalize understanding and are in agreement with the plan.  They have been advised to contact the office for any questions, concerns, or changes related to their health.

## 2024-03-08 ENCOUNTER — TELEPHONE (OUTPATIENT)
Dept: GASTROENTEROLOGY | Facility: CLINIC | Age: 74
End: 2024-03-08
Payer: MEDICARE

## 2024-03-20 ENCOUNTER — TELEPHONE (OUTPATIENT)
Dept: GASTROENTEROLOGY | Facility: CLINIC | Age: 74
End: 2024-03-20
Payer: MEDICARE

## 2024-03-20 NOTE — TELEPHONE ENCOUNTER
Per [facility] case management cannot offered cash price due to denial for pt will need to change therapy.

## 2024-03-25 RX ORDER — PANTOPRAZOLE SODIUM 40 MG/1
40 TABLET, DELAYED RELEASE ORAL 2 TIMES DAILY
Qty: 180 TABLET | Refills: 3 | Status: SHIPPED | OUTPATIENT
Start: 2024-03-25

## 2024-03-25 NOTE — TELEPHONE ENCOUNTER
Appeal denied   Send to provider for recommendations    Approved alternatives:  Omeprazole and esomeprazole

## 2024-04-09 RX ORDER — PANTOPRAZOLE SODIUM 40 MG/1
40 TABLET, DELAYED RELEASE ORAL 2 TIMES DAILY
Qty: 180 TABLET | Refills: 3 | Status: SHIPPED | OUTPATIENT
Start: 2024-04-09

## 2024-04-18 DIAGNOSIS — E11.649 TYPE 2 DIABETES MELLITUS WITH HYPOGLYCEMIA WITHOUT COMA, WITH LONG-TERM CURRENT USE OF INSULIN: ICD-10-CM

## 2024-04-18 DIAGNOSIS — Z79.4 TYPE 2 DIABETES MELLITUS WITH HYPOGLYCEMIA WITHOUT COMA, WITH LONG-TERM CURRENT USE OF INSULIN: ICD-10-CM

## 2024-04-18 DIAGNOSIS — I10 ESSENTIAL HYPERTENSION: ICD-10-CM

## 2024-04-18 DIAGNOSIS — N40.1 BENIGN PROSTATIC HYPERPLASIA WITH URINARY FREQUENCY: ICD-10-CM

## 2024-04-18 DIAGNOSIS — R35.0 BENIGN PROSTATIC HYPERPLASIA WITH URINARY FREQUENCY: ICD-10-CM

## 2024-04-18 RX ORDER — METFORMIN HYDROCHLORIDE 750 MG/1
750 TABLET, EXTENDED RELEASE ORAL
Qty: 90 TABLET | Refills: 0 | Status: SHIPPED | OUTPATIENT
Start: 2024-04-18 | End: 2024-04-19 | Stop reason: SDUPTHER

## 2024-04-18 RX ORDER — FINASTERIDE 5 MG/1
5 TABLET, FILM COATED ORAL DAILY
Qty: 90 TABLET | Refills: 0 | Status: SHIPPED | OUTPATIENT
Start: 2024-04-18 | End: 2024-04-19 | Stop reason: SDUPTHER

## 2024-04-18 RX ORDER — LISINOPRIL 5 MG/1
5 TABLET ORAL DAILY
Qty: 90 TABLET | Refills: 0 | Status: SHIPPED | OUTPATIENT
Start: 2024-04-18 | End: 2024-04-19 | Stop reason: SDUPTHER

## 2024-04-19 DIAGNOSIS — Z79.4 TYPE 2 DIABETES MELLITUS WITH HYPOGLYCEMIA WITHOUT COMA, WITH LONG-TERM CURRENT USE OF INSULIN: ICD-10-CM

## 2024-04-19 DIAGNOSIS — E11.649 TYPE 2 DIABETES MELLITUS WITH HYPOGLYCEMIA WITHOUT COMA, WITH LONG-TERM CURRENT USE OF INSULIN: ICD-10-CM

## 2024-04-19 DIAGNOSIS — R35.0 BENIGN PROSTATIC HYPERPLASIA WITH URINARY FREQUENCY: ICD-10-CM

## 2024-04-19 DIAGNOSIS — N40.1 BENIGN PROSTATIC HYPERPLASIA WITH URINARY FREQUENCY: ICD-10-CM

## 2024-04-19 DIAGNOSIS — I10 ESSENTIAL HYPERTENSION: ICD-10-CM

## 2024-04-19 DIAGNOSIS — E78.2 MIXED HYPERLIPIDEMIA: ICD-10-CM

## 2024-04-19 RX ORDER — TERAZOSIN 10 MG/1
10 CAPSULE ORAL NIGHTLY
Qty: 90 CAPSULE | Refills: 1 | Status: SHIPPED | OUTPATIENT
Start: 2024-04-19

## 2024-04-19 RX ORDER — FINASTERIDE 5 MG/1
5 TABLET, FILM COATED ORAL DAILY
Qty: 90 TABLET | Refills: 1 | Status: SHIPPED | OUTPATIENT
Start: 2024-04-19

## 2024-04-19 RX ORDER — LISINOPRIL 5 MG/1
5 TABLET ORAL DAILY
Qty: 90 TABLET | Refills: 1 | Status: SHIPPED | OUTPATIENT
Start: 2024-04-19

## 2024-04-19 RX ORDER — SIMVASTATIN 20 MG
20 TABLET ORAL NIGHTLY
Qty: 90 TABLET | Refills: 1 | Status: SHIPPED | OUTPATIENT
Start: 2024-04-19

## 2024-04-19 RX ORDER — METFORMIN HYDROCHLORIDE 750 MG/1
750 TABLET, EXTENDED RELEASE ORAL
Qty: 90 TABLET | Refills: 1 | Status: SHIPPED | OUTPATIENT
Start: 2024-04-19

## 2024-05-28 ENCOUNTER — OFFICE VISIT (OUTPATIENT)
Dept: CARDIOLOGY | Facility: CLINIC | Age: 74
End: 2024-05-28
Payer: MEDICARE

## 2024-05-28 VITALS
HEART RATE: 50 BPM | RESPIRATION RATE: 20 BRPM | BODY MASS INDEX: 23.89 KG/M2 | WEIGHT: 180.3 LBS | OXYGEN SATURATION: 96 % | SYSTOLIC BLOOD PRESSURE: 100 MMHG | HEIGHT: 73 IN | DIASTOLIC BLOOD PRESSURE: 50 MMHG

## 2024-05-28 DIAGNOSIS — I10 PRIMARY HYPERTENSION: Primary | ICD-10-CM

## 2024-05-28 DIAGNOSIS — E11.9 TYPE 2 DIABETES MELLITUS WITHOUT COMPLICATION, WITHOUT LONG-TERM CURRENT USE OF INSULIN: ICD-10-CM

## 2024-05-28 DIAGNOSIS — K22.70 BARRETT'S ESOPHAGUS WITHOUT DYSPLASIA: ICD-10-CM

## 2024-05-28 DIAGNOSIS — E78.2 MIXED HYPERLIPIDEMIA: ICD-10-CM

## 2024-05-28 NOTE — PROGRESS NOTES
"    CARDIOLOGY        Patient Name: Rupesh Fox  :1950  Age: 73 y.o.  Primary Cardiologist: Lissy Oneal MD  Encounter Provider:  Alfredo Jamil PA-C    Date of Service: 24        CHIEF COMPLAINT / REASON FOR OFFICE VISIT     1 year follow-up      HISTORY OF PRESENT ILLNESS       HPI  Rupesh Fox is a 73 y.o. male who presents today for 1 year follow-up.     Pt has a  history significant for bradycardia, hypertension, hyperlipidemia, diabetes, shortness of breath presents for 1 year follow-up.  Patient was last seen in office on 2023 where he is feeling well.  He was asymptomatic from his bradycardia and felt well.  He was to have a echocardiogram along with labs for magnesium and TSH drawn.  His magnesium and TSH were normal.  His echocardiogram was stable.    Patient been doing well since his last office visit.  Patient does occasionally have issues with shortness of breath due to his laryngeal pharyngeal reflux.  Patient has no other complaints.  Patient has not had any chest pain, chest pressure, palpitations, exertional dyspnea, swelling to his legs, orthopnea, or any other issues.  Patient does continue to try to be active but has been limited due to his left knee.  Patient has been seeing Dr. Agustin for this and eventual will need for replacement.    The following portions of the patient's history were reviewed and updated as appropriate: allergies, current medications, past family history, past medical history, past social history, past surgical history and problem list.      VITAL SIGNS     Visit Vitals  /50 (BP Location: Left arm, Patient Position: Sitting, Cuff Size: Adult)   Pulse 50   Resp 20   Ht 185.4 cm (73\")   Wt 81.8 kg (180 lb 4.8 oz)   SpO2 96%   BMI 23.79 kg/m²       @[email or web address]  Wt Readings from Last 3 Encounters:   24 81.8 kg (180 lb 4.8 oz)   24 83.4 kg (183 lb 14.4 oz)   24 82.3 kg (181 lb 8 oz)     Body mass index is " 23.79 kg/m².        PHYSICAL EXAMINATION     Constitutional:       General: Awake. Not in acute distress.     Appearance: Healthy appearance. Not in distress.   Pulmonary:      Effort: Pulmonary effort is normal.      Breath sounds: Normal breath sounds.   Cardiovascular:      Bradycardia present. Regular rhythm.      Murmurs: There is no murmur.   Edema:     Peripheral edema absent.   Skin:     General: Skin is warm.   Neurological:      Mental Status: Alert.   Psychiatric:         Behavior: Behavior is cooperative.           REVIEWED DATA       ECG 12 Lead    Date/Time: 5/28/2024 9:39 AM  Performed by: Alfredo Jamil PA-C    Authorized by: Alfredo Jamil PA-C  Comparison: compared with previous ECG   Similar to previous ECG  Rhythm: sinus bradycardia  Rate: normal  BPM: 47  QRS axis: normal  Other findings: poor R wave progression    Clinical impression: non-specific ECG  Comments: EKG looks similar to EKG on 5/17/2023          Cardiac Procedures:    Transthoracic echo on 6/21/2023  Interpretation Summary      Left ventricular systolic function is normal. Calculated left ventricular EF = 61.2%    Left ventricular diastolic function was normal.    The left atrial cavity is moderate to severely dilated.     Vascular screening on 8/22/2023  Interpretation Summary      Abdominal Aortic Aneurysm (AAA) Screening: Maximum proximal diameter of 2.14 cm. The artery was normal.    Lipid Panel          7/6/2023    08:34 10/20/2023    09:43 2/28/2024    09:29   Lipid Panel   Total Cholesterol 151  139  135    Triglycerides 44  50  60    HDL Cholesterol 61  63  63    VLDL Cholesterol 10  11  13    LDL Cholesterol  80  65  59        Lab Results   Component Value Date     02/28/2024     10/20/2023    K 5.3 (H) 02/28/2024    K 4.6 10/20/2023     02/28/2024     10/20/2023    CO2 24.5 02/28/2024    CO2 25 10/20/2023    BUN 20 02/28/2024    BUN 20 10/20/2023    CREATININE 1.17 02/28/2024     CREATININE 1.18 10/20/2023    EGFRIFNONA 59 (L) 01/06/2022    EGFRIFNONA 69 10/06/2021    EGFRIFAFRI 68 01/06/2022    EGFRIFAFRI 84 10/06/2021    GLUCOSE 128 (H) 02/28/2024    GLUCOSE 133 (H) 10/20/2023    CALCIUM 9.6 02/28/2024    CALCIUM 9.4 10/20/2023    PROTENTOTREF 6.5 02/28/2024    PROTENTOTREF 6.9 10/20/2023    ALBUMIN 4.5 02/28/2024    ALBUMIN 4.5 10/20/2023    BILITOT 0.8 02/28/2024    BILITOT 0.7 10/20/2023    AST 21 02/28/2024    AST 23 10/20/2023    ALT 16 02/28/2024    ALT 18 10/20/2023     Lab Results   Component Value Date    WBC 7.3 10/20/2023    WBC 6.08 10/18/2022    HGB 13.4 10/20/2023    HGB 13.5 10/18/2022    HCT 39.9 10/20/2023    HCT 40.8 10/18/2022    MCV 93 10/20/2023    MCV 94.0 10/18/2022     10/20/2023     10/18/2022         ASSESSMENT & PLAN     Diagnoses and all orders for this visit:      Bradycardia, asymptomatic.     Dyspnea, nonexertional.  No changes in his laryngeal reflux disease and his congestion he has from this.    Hypertension, goal <120/80.     Hyperlipidemia, on simvastatin    Diabetes mellitus type 2    Laryngopharyngeal reflux disease and GERD-follows with ENT.  Causes hoarseness of his voice, throat soreness and short windedness.    History of melanoma    Patient doing well over the past year.  Patient has not had any issues.  Will continue his current regimen and have him follow-up with Dr. Oneal in 1 year.    Return in about 1 year (around 5/28/2025) for Dr. Oneal.    Future Appointments         Provider Department Center    5/30/2024 9:45 AM Leland Crason MD [facility] [place]    6/5/2024 9:10 AM Sergo Agustin MD [facility] ORTHOPEDICS [place]    6/11/2024 9:00 AM Elyssa Ruff APRN [facility] GASTROENTEROLOGY [place]    6/2/2025 9:40 AM Lissy Oneal MD [facility] CARDIOLOGY [place]                MEDICATIONS         Discharge  Medications            Accurate as of May 28, 2024  9:59 AM. If you have any questions, ask your nurse or doctor.                Continue These Medications        Instructions Start Date   finasteride 5 MG tablet  Commonly known as: PROSCAR   5 mg, Oral, Daily      Glucosamine Chondroitin Joint tablet   Oral, 2 Times Daily      glucose blood test strip   Use as instructed      lisinopril 5 MG tablet  Commonly known as: PRINIVIL,ZESTRIL   5 mg, Oral, Daily      metFORMIN  MG 24 hr tablet  Commonly known as: GLUCOPHAGE-XR   750 mg, Oral, Daily With Breakfast      multivitamin with minerals tablet tablet   1 tablet, Oral, Daily      pantoprazole 40 MG EC tablet  Commonly known as: PROTONIX   40 mg, Oral, 2 Times Daily      simvastatin 20 MG tablet  Commonly known as: ZOCOR   20 mg, Oral, Nightly      terazosin 10 MG capsule  Commonly known as: HYTRIN   10 mg, Oral, Nightly                   **Jeremyon Disclaimer:   Much of this encounter note is an electronic transcription/translation of spoken language to printed text. The electronic translation of spoken language may permit erroneous, or at times, nonsensical words or phrases to be inadvertently transcribed. Although I have reviewed the note for such errors, some may still exist.

## 2024-05-30 ENCOUNTER — OFFICE VISIT (OUTPATIENT)
Dept: INTERNAL MEDICINE | Facility: CLINIC | Age: 74
End: 2024-05-30
Payer: MEDICARE

## 2024-05-30 VITALS
BODY MASS INDEX: 24.09 KG/M2 | SYSTOLIC BLOOD PRESSURE: 118 MMHG | HEART RATE: 50 BPM | TEMPERATURE: 98.6 F | DIASTOLIC BLOOD PRESSURE: 64 MMHG | OXYGEN SATURATION: 98 % | HEIGHT: 73 IN | WEIGHT: 181.8 LBS

## 2024-05-30 DIAGNOSIS — Z79.4 TYPE 2 DIABETES MELLITUS WITH HYPOGLYCEMIA WITHOUT COMA, WITH LONG-TERM CURRENT USE OF INSULIN: Primary | ICD-10-CM

## 2024-05-30 DIAGNOSIS — E11.649 TYPE 2 DIABETES MELLITUS WITH HYPOGLYCEMIA WITHOUT COMA, WITH LONG-TERM CURRENT USE OF INSULIN: Primary | ICD-10-CM

## 2024-05-30 DIAGNOSIS — I10 ESSENTIAL HYPERTENSION: ICD-10-CM

## 2024-05-30 DIAGNOSIS — E78.2 MIXED HYPERLIPIDEMIA: ICD-10-CM

## 2024-05-30 LAB
ALBUMIN SERPL-MCNC: 4.4 G/DL (ref 3.5–5.2)
ALBUMIN/GLOB SERPL: 2.2 G/DL
ALP SERPL-CCNC: 79 U/L (ref 39–117)
ALT SERPL-CCNC: 20 U/L (ref 1–41)
AST SERPL-CCNC: 28 U/L (ref 1–40)
BILIRUB SERPL-MCNC: 0.6 MG/DL (ref 0–1.2)
BUN SERPL-MCNC: 24 MG/DL (ref 8–23)
BUN/CREAT SERPL: 21.2 (ref 7–25)
CALCIUM SERPL-MCNC: 9.4 MG/DL (ref 8.6–10.5)
CHLORIDE SERPL-SCNC: 106 MMOL/L (ref 98–107)
CHOLEST SERPL-MCNC: 124 MG/DL (ref 0–200)
CO2 SERPL-SCNC: 27.3 MMOL/L (ref 22–29)
CREAT SERPL-MCNC: 1.13 MG/DL (ref 0.76–1.27)
EGFRCR SERPLBLD CKD-EPI 2021: 68.2 ML/MIN/1.73
GLOBULIN SER CALC-MCNC: 2 GM/DL
GLUCOSE SERPL-MCNC: 126 MG/DL (ref 65–99)
HBA1C MFR BLD: 6.4 % (ref 4.8–5.6)
HDLC SERPL-MCNC: 60 MG/DL (ref 40–60)
LDLC SERPL CALC-MCNC: 54 MG/DL (ref 0–100)
POTASSIUM SERPL-SCNC: 5.8 MMOL/L (ref 3.5–5.2)
PROT SERPL-MCNC: 6.4 G/DL (ref 6–8.5)
SODIUM SERPL-SCNC: 142 MMOL/L (ref 136–145)
TRIGL SERPL-MCNC: 39 MG/DL (ref 0–150)
VLDLC SERPL CALC-MCNC: 10 MG/DL (ref 5–40)

## 2024-05-30 PROCEDURE — 1159F MED LIST DOCD IN RCRD: CPT | Performed by: INTERNAL MEDICINE

## 2024-05-30 PROCEDURE — 99214 OFFICE O/P EST MOD 30 MIN: CPT | Performed by: INTERNAL MEDICINE

## 2024-05-30 PROCEDURE — 1160F RVW MEDS BY RX/DR IN RCRD: CPT | Performed by: INTERNAL MEDICINE

## 2024-05-30 PROCEDURE — 3078F DIAST BP <80 MM HG: CPT | Performed by: INTERNAL MEDICINE

## 2024-05-30 PROCEDURE — 3044F HG A1C LEVEL LT 7.0%: CPT | Performed by: INTERNAL MEDICINE

## 2024-05-30 PROCEDURE — 3074F SYST BP LT 130 MM HG: CPT | Performed by: INTERNAL MEDICINE

## 2024-05-30 PROCEDURE — 1126F AMNT PAIN NOTED NONE PRSNT: CPT | Performed by: INTERNAL MEDICINE

## 2024-05-30 NOTE — PROGRESS NOTES
"Chief Complaint  Diabetes and Hypertension    Subjective        Rupesh Fox presents to [facility]  History of Present Illness  Here for f/u HTN, T2DM, HLD  Taking all meds as prescribed, no side effects  Just saw cardiology, no new issues there, stable EKG     Objective   Vital Signs:  /64   Pulse 50   Temp 98.6 °F (37 °C)   Ht 185.4 cm (73\")   Wt 82.5 kg (181 lb 12.8 oz)   SpO2 98%   BMI 23.99 kg/m²   Estimated body mass index is 23.99 kg/m² as calculated from the following:    Height as of this encounter: 185.4 cm (73\").    Weight as of this encounter: 82.5 kg (181 lb 12.8 oz).       BMI is within normal parameters. No other follow-up for BMI required.      Physical Exam  Vitals and nursing note reviewed.   Constitutional:       General: He is not in acute distress.     Appearance: Normal appearance. He is not toxic-appearing.   HENT:      Head: Normocephalic and atraumatic.      Right Ear: External ear normal.      Left Ear: External ear normal.      Nose: Nose normal.   Eyes:      General: No scleral icterus.        Right eye: No discharge.         Left eye: No discharge.      Extraocular Movements: Extraocular movements intact.      Conjunctiva/sclera: Conjunctivae normal.      Pupils: Pupils are equal, round, and reactive to light.   Cardiovascular:      Rate and Rhythm: Normal rate and regular rhythm.      Pulses: Normal pulses.      Heart sounds: Normal heart sounds. No murmur heard.  Pulmonary:      Effort: Pulmonary effort is normal.   Musculoskeletal:         General: Normal range of motion.   Skin:     General: Skin is warm.      Capillary Refill: Capillary refill takes less than 2 seconds.   Neurological:      General: No focal deficit present.      Mental Status: He is alert and oriented to person, place, and time. Mental status is at baseline.      Cranial Nerves: No cranial nerve deficit.      Gait: Gait normal.   Psychiatric:         " Mood and Affect: Mood normal.         Behavior: Behavior normal.         Thought Content: Thought content normal.         Judgment: Judgment normal.        Result Review :    The following data was reviewed by: Leland Carson MD on 05/30/2024:  Common labs          8/10/2023    10:24 10/20/2023    09:43 2/28/2024    09:29   Common Labs   Glucose 139  133  128    BUN 22  20  20    Creatinine 1.23  1.18  1.17    Sodium 143  144  142    Potassium 4.9  4.6  5.3    Chloride 109  105  106    Calcium 9.5  9.4  9.6    Total Protein  6.9  6.5    Albumin  4.5  4.5    Total Bilirubin  0.7  0.8    Alkaline Phosphatase  63  66    AST (SGOT)  23  21    ALT (SGPT)  18  16    WBC  7.3     Hemoglobin  13.4     Hematocrit  39.9     Platelets  165     Total Cholesterol  139  135    Triglycerides  50  60    HDL Cholesterol  63  63    LDL Cholesterol   65  59    Hemoglobin A1C  6.3  6.30    Microalbumin, Urine  <3.0       Data reviewed : prior office notes reviewed             Assessment and Plan     Diagnoses and all orders for this visit:    1. Type 2 diabetes mellitus with hypoglycemia without coma, with long-term current use of insulin (Primary)  -     Hemoglobin A1c  Check A1c for ongoing monitoring  Continue metformin 750mg XL daily    2. Mixed hyperlipidemia  -     Lipid panel  Continue simvastatin 20mg daily, check FLP for ongoing monitoring    3. Essential hypertension  -     Comprehensive metabolic panel  At goal continue lisinopril 5mg daily         I spent 20 minutes caring for Rupesh on this date of service. This time includes time spent by me in the following activities:preparing for the visit, reviewing tests, obtaining and/or reviewing a separately obtained history, performing a medically appropriate examination and/or evaluation , counseling and educating the patient/family/caregiver, ordering medications, tests, or procedures, and documenting information in the medical record  Follow Up     F/u 6 months for medicare  wellness exam   Patient was given instructions and counseling regarding his condition or for health maintenance advice. Please see specific information pulled into the AVS if appropriate.     Leland Carson MD  [facility]  [address]  Phone: [phone]

## 2024-06-03 DIAGNOSIS — E87.5 HYPERKALEMIA: Primary | ICD-10-CM

## 2024-06-05 ENCOUNTER — OFFICE VISIT (OUTPATIENT)
Dept: ORTHOPEDIC SURGERY | Facility: CLINIC | Age: 74
End: 2024-06-05
Payer: MEDICARE

## 2024-06-05 ENCOUNTER — LAB (OUTPATIENT)
Dept: LAB | Facility: HOSPITAL | Age: 74
End: 2024-06-05
Payer: MEDICARE

## 2024-06-05 VITALS — HEIGHT: 73 IN | BODY MASS INDEX: 23.99 KG/M2 | WEIGHT: 181 LBS

## 2024-06-05 DIAGNOSIS — M17.12 PRIMARY OSTEOARTHRITIS OF LEFT KNEE: Primary | ICD-10-CM

## 2024-06-05 PROCEDURE — 80048 BASIC METABOLIC PNL TOTAL CA: CPT | Performed by: INTERNAL MEDICINE

## 2024-06-05 RX ORDER — LIDOCAINE HYDROCHLORIDE 10 MG/ML
8 INJECTION, SOLUTION EPIDURAL; INFILTRATION; INTRACAUDAL; PERINEURAL
Status: COMPLETED | OUTPATIENT
Start: 2024-06-05 | End: 2024-06-05

## 2024-06-05 RX ORDER — TRIAMCINOLONE ACETONIDE 40 MG/ML
80 INJECTION, SUSPENSION INTRA-ARTICULAR; INTRAMUSCULAR
Status: COMPLETED | OUTPATIENT
Start: 2024-06-05 | End: 2024-06-05

## 2024-06-05 RX ADMIN — LIDOCAINE HYDROCHLORIDE 8 ML: 10 INJECTION, SOLUTION EPIDURAL; INFILTRATION; INTRACAUDAL; PERINEURAL at 09:10

## 2024-06-05 RX ADMIN — TRIAMCINOLONE ACETONIDE 80 MG: 40 INJECTION, SUSPENSION INTRA-ARTICULAR; INTRAMUSCULAR at 09:10

## 2024-06-05 NOTE — PROGRESS NOTES
Subjective:     Patient ID: Rupesh Fox is a 73 y.o. male.    Chief Complaint:  Follow-up left knee pain, DJD  Last injection 2023  History of Present Illness  Rupesh Fox returns to clinic today for evaluation of left knee, states that his last injection worked about 8 weeks, since then he has had moderate recurrence of pain, localizes primarily to the medial and anterior aspect of his left knee.  Worse with prolonged walking weightbearing and deep flexion activities.  States by noon his knee starts to ache fairly significantly on most days.  Denies any locking or catching.  Denies radiation of pain, denies associated numbness or tingling.     Social History     Occupational History    Occupation: retired   Tobacco Use    Smoking status: Former     Current packs/day: 0.00     Average packs/day: 1 pack/day for 10.0 years (10.0 ttl pk-yrs)     Types: Cigarettes, Pipe, Cigars     Start date: 1965     Quit date: 1975     Years since quittin.4     Passive exposure: Past    Smokeless tobacco: Never    Tobacco comments:     Caffeine: 2 cups coffee daily   Vaping Use    Vaping status: Never Used   Substance and Sexual Activity    Alcohol use: Yes     Alcohol/week: 1.0 standard drink of alcohol     Types: 1 Cans of beer per week     Comment: social minimal    Drug use: Never    Sexual activity: Yes      Past Medical History:   Diagnosis Date    Allergic rhinitis     Arthritis of neck     Ann esophagus 2023    Barretts esophagus     Benign prostatic hyperplasia without lower urinary tract symptoms     Bradycardia, unspecified     Cancer melanoma    Cellulitis and abscess of toe     Cervicalgia     Colon polyp     Deviated septum     Diabetes mellitus     Diabetic eye exam     , 6/4/15 - negative,  - small change in vessel,  - negative, f/u 12 mo, 3/18 - negative,  - negative    Dysphagia     Enlarged prostate without lower urinary tract symptoms (luts)     Essential (primary)  "hypertension     Fracture, femur     Fracture, foot 1972    GERD (gastroesophageal reflux disease)     Gluteal tendinitis, unspecified hip     Goiter     Hearing loss     Hip arthrosis     Hyperlipidemia, unspecified     Knee swelling     Low back strain     Pain in left knee     Pain in right shoulder     Problem with sexual function     Pure hypercholesterolemia     Sprain     Sprain of hip: Sprain of other specified sites of hip and thigh    Tinnitus, unspecified ear     Trochanteric bursitis, left hip     Type 2 diabetes mellitus without complication      Past Surgical History:   Procedure Laterality Date    COLONOSCOPY      COLONOSCOPY N/A 12/06/2017    Procedure: COLONOSCOPY;  Surgeon: Rosalia James MD;  Location: [facility] OR;  Service:     ENDOSCOPY  05/2015    GERD/dysphagia - erosive gastritis, normal esophagus    ENDOSCOPY N/A 02/07/2023    Procedure: ESOPHAGOGASTRODUODENOSCOPY;  Surgeon: Lico Mcmillan MD;  [place] [facility] [place]  Service: Gastroenterology;  Laterality: N/A;  Esophagitis and Gastritis    FLEXIBLE SIGMOIDOSCOPY      FRACTURE SURGERY Left 1972    FX: Femur    LIVER BIOPSY      SEPTOPLASTY      SINUS SURGERY      TONSILLECTOMY      UPPER GASTROINTESTINAL ENDOSCOPY  2015       Family History   Problem Relation Age of Onset    Hypertension Mother     Stroke Mother     Stroke Father     Heart failure Father     Benign prostatic hyperplasia Father     Colon cancer Neg Hx     Colon polyps Neg Hx          Review of Systems        Objective:  Vitals:    06/05/24 0901   Weight: 82.1 kg (181 lb)   Height: 185.4 cm (73\")         06/05/24  0901   Weight: 82.1 kg (181 lb)     Body mass index is 23.88 kg/m².  Physical Exam    Vital signs reviewed.   General: No acute distress, alert and oriented  Eyes: conjunctiva clear; pupils equally round and reactive  ENT: external ears and nose atraumatic; oropharynx clear  CV: no peripheral edema  Resp: normal respiratory effort  Skin: no rashes or " wounds; normal turgor  Psych: mood and affect appropriate; recent and remote memory intact        Ortho Exam     Left knee--range of motion 2 to 125 degrees, 4+ out of 5 strength on flexion and extension.  Maximal tenderness to palpation medial joint line as well as medial lateral patellar facet, positive active patella compression test.  Stable to varus and valgus stress at 2 and 30 degrees    Imaging:  Left Knee X-Ray  Indication: Pain    AP, Lateral, and [place] views    Findings:  No fracture  No bony lesion  Normal soft tissues  Moderate medial and mild patellofemoral compartment joint space narrowing with reactive osteophyte formation, mineralization of soft tissue medially potentially of the medial meniscus with subchondral sclerosis of the medial tibial plateau.  Overall varus alignment  Compared to prior office x-rays    Assessment:        1. Primary osteoarthritis of left knee           Plan:    - Large Joint Arthrocentesis: L knee on 6/5/2024 9:10 AM  Indications: pain  Details: 22 G needle, anterolateral approach  Medications: 80 mg triamcinolone acetonide 40 MG/ML; 8 mL lidocaine PF 1% 1 %  Outcome: tolerated well, no immediate complications  Procedure, treatment alternatives, risks and benefits explained, specific risks discussed. Consent was given by the patient. Immediately prior to procedure a time out was called to verify the correct patient, procedure, equipment, support staff and site/side marked as required. Patient was prepped and draped in the usual sterile fashion.                 Discussed treatment options at length with patient at today's visit.  Reviewed options with patient including but not limited to repeat injection, total knee arthroplasty, bracing, physical therapy.  He is going continue with home exercises, wants to hold off on surgery at this time but may consider later in the year.  Would like to proceed with repeat injection today  Patient would like to proceed with cortisone  injection today to the Left knee. Recommended limited use of affected extremity for the next 24 hours to only essential activites other than work on general active and passive motion. Recommended supplementing with ice and soft tissue massage. Discussed with patient that they should see results in 5-7 days, if no improvement in 5-6 weeks I have asked them to call the office to review other options. Patient should call office immediately if they notice redness, warmth, fevers, chills, or residual numbness or tingling for greater than 6 hours after injection.   Continue home exercises for work on motion and strength  Follow up: As needed      Rupesh Fox was in agreement with plan and had all questions answered.     Orders:  Orders Placed This Encounter   Procedures    - Large Joint Arthrocentesis: L knee    XR Knee 3+ View With [place] Left       Medications:  No orders of the defined types were placed in this encounter.      Followup:  No follow-ups on file.    Diagnoses and all orders for this visit:    1. Primary osteoarthritis of left knee (Primary)  -     XR Knee 3+ View With [place] Left    Other orders  -     - Large Joint Arthrocentesis: L knee          Dictated utilizing Dragon dictation

## 2024-06-11 ENCOUNTER — OFFICE VISIT (OUTPATIENT)
Dept: GASTROENTEROLOGY | Facility: CLINIC | Age: 74
End: 2024-06-11
Payer: MEDICARE

## 2024-06-11 VITALS
DIASTOLIC BLOOD PRESSURE: 54 MMHG | BODY MASS INDEX: 23.7 KG/M2 | WEIGHT: 178.8 LBS | SYSTOLIC BLOOD PRESSURE: 100 MMHG | HEIGHT: 73 IN

## 2024-06-11 DIAGNOSIS — R14.3 FLATULENCE: ICD-10-CM

## 2024-06-11 DIAGNOSIS — K21.9 LARYNGOPHARYNGEAL REFLUX (LPR): Primary | ICD-10-CM

## 2024-06-11 RX ORDER — FAMOTIDINE 40 MG/1
40 TABLET, FILM COATED ORAL NIGHTLY
Qty: 90 TABLET | Refills: 3 | Status: SHIPPED | OUTPATIENT
Start: 2024-06-11

## 2024-06-11 RX ORDER — ESOMEPRAZOLE MAGNESIUM 40 MG/1
40 CAPSULE, DELAYED RELEASE ORAL 2 TIMES DAILY
Qty: 180 CAPSULE | Refills: 3 | Status: SHIPPED | OUTPATIENT
Start: 2024-06-11

## 2024-06-11 NOTE — PROGRESS NOTES
PATIENT INFORMATION  Rupesh Fox       - 1950    CHIEF COMPLAINT  Chief Complaint   Patient presents with    Ann's Esophagus     Laryngopharyngeal reflux (LPR)    Heartburn       HISTORY OF PRESENT ILLNESS  Here today for Barretts follow-up    Bowels are moving daily, easy and well controlled, gas comes and goes, more frequent in the mornings. Improves after BM in morning. Reviewed fiber, water, probiotic recommendations. Is eating a high fiber diet. TODAY: Still having gas, taking probiotic with no improvement. Not taking fiber supplement, reviewed and will start benefiber. Gas for 1-2 yrs.    Per LOV: Managed on BID pantoprazole. No odynophagia, dysphagia, nausea, vomiting, abdominal pain, bloating, belching. Rare breakthrough symptoms. No NSAIDs or OTC antacids. Very seldom has dysphagia, no stuck foods, still having nasal drainage and tightness from constantly clearing throat. Hoarseness is still there and belching a lot in the morning (improved with tums) when doing sit ups and passing a lot of gas. No taste. Drainage for 15-20 min after every meal. Reviewed elevating HOB and avoiding eating late. Tried Voquezna and felt drainage and swallowing was worse. Resumed PPI BID. TODAY: Congestion is spotty, swallowing is ok, drainage is still present and hoarseness.    2023 for reflux esophagitis, positive for gastritis, reflux with barretts, negative for HP, dysplasia.    Last colon 2017 normal with hemorrhoids. In  had 7 adenomas removed. One in between not sure whether there polyps. Will  plan to repeat colonoscopy this year/      Heartburn  He reports no abdominal pain or no nausea.       REVIEWED PERTINENT RESULTS/ LABS  Lab Results   Component Value Date    CASEREPORT  2023     Surgical Pathology Report                         Case: [id number]                                  Authorizing Provider:  Lico Mcmillan        Collected:           2023 02:03 PM                                  MD Saqib                                                                   Ordering Location:     [facility]     Received:            02/07/2023 02:36 PM                                 CENTER [place] MAIN OR                                                     Pathologist:           Gallo Zhang MD                                                         Specimens:   1) - Gastric, Antrum, Gastric biopsy                                                                2) - Esophagus, Distal, distal Esophagus biopsy                                            FINALDX  02/07/2023     1. Stomach, Biopsy: Benign gastric mucosa with    A. Mild chronic inflammation.   B. Intestinal metaplasia with no dysplasia.   C. No Helicobacter pylori.    D. Reactive changes of the epithelium.    2. Esophagus, Distal, Biopsy: Benign squamous and gastric mucosa with   A. Intestinal metaplasia consistent with Ann's esophagus with reactive changes and no dysplasia.   B. Extensive chronic inflammation of the gastric mucosa.   C. No Helicobacter pylori.    jose a/pkm        Lab Results   Component Value Date    HGB 13.4 10/20/2023    MCV 93 10/20/2023     10/20/2023    ALT 20 05/30/2024    AST 28 05/30/2024    HGBA1C 6.40 (H) 05/30/2024    TRIG 39 05/30/2024      XR Knee 3+ View With [place] Left    Result Date: 6/5/2024  Impression: Ordering physician's impression is located in the Encounter Note dated 06/05/24.       REVIEW OF SYSTEMS  Review of Systems   Constitutional: Negative.    HENT:  Positive for congestion.    Eyes: Negative.    Respiratory: Negative.     Cardiovascular: Negative.    Gastrointestinal:  Negative for abdominal pain, constipation, diarrhea, nausea and vomiting.        LPR, GERD, Ann's    Endocrine: Negative.    Genitourinary: Negative.    Musculoskeletal: Negative.    Skin: Negative.    Allergic/Immunologic: Negative.    Neurological: Negative.    Hematological:  Negative.    Psychiatric/Behavioral: Negative.           ACTIVE PROBLEMS  Patient Active Problem List    Diagnosis     Type 2 diabetes mellitus without complication, without long-term current use of insulin [E11.9]     Mixed hyperlipidemia [E78.2]     Primary hypertension [I10]     Gastroesophageal reflux disease with esophagitis without hemorrhage [K21.00]     Ann's esophagus without dysplasia [K22.70]     Acid reflux [K21.9]     Can't get food down [MUY9456]     Gastrointestinal ulcer due to Helicobacter pylori [K28.9, B96.81]     Hx of colonic polyps [Z86.010]          PAST MEDICAL HISTORY  Past Medical History:   Diagnosis Date    Allergic rhinitis     Arthritis of neck     Ann esophagus Feb 2023    Barretts esophagus     Benign prostatic hyperplasia without lower urinary tract symptoms     Bradycardia, unspecified     Cancer melanoma    Cellulitis and abscess of toe     Cervicalgia     Colon polyp     Deviated septum     Diabetes mellitus     Diabetic eye exam     5/14, 6/4/15 - negative, 6/16 - small change in vessel, 5/17 - negative, f/u 12 mo, 3/18 - negative, 4/19 - negative    Dysphagia     Enlarged prostate without lower urinary tract symptoms (luts)     Essential (primary) hypertension     Fracture, femur     Fracture, foot 1972    GERD (gastroesophageal reflux disease)     Gluteal tendinitis, unspecified hip     Goiter     Hearing loss     Hip arthrosis     Hyperlipidemia, unspecified     Knee swelling     Low back strain     Pain in left knee     Pain in right shoulder     Problem with sexual function     Pure hypercholesterolemia     Sprain     Sprain of hip: Sprain of other specified sites of hip and thigh    Tinnitus, unspecified ear     Trochanteric bursitis, left hip     Type 2 diabetes mellitus without complication          SURGICAL HISTORY  Past Surgical History:   Procedure Laterality Date    COLONOSCOPY      COLONOSCOPY N/A 12/06/2017    Procedure: COLONOSCOPY;  Surgeon: Rosalia James,  MD;  Location:  [facility] OR;  Service:     ENDOSCOPY  2015    GERD/dysphagia - erosive gastritis, normal esophagus    ENDOSCOPY N/A 2023    Procedure: ESOPHAGOGASTRODUODENOSCOPY;  Surgeon: Lico Mcmillan MD;  Location: [facility] OR;  Service: Gastroenterology;  Laterality: N/A;  Esophagitis and Gastritis    FLEXIBLE SIGMOIDOSCOPY      FRACTURE SURGERY Left 1972    FX: Femur    LIVER BIOPSY      SEPTOPLASTY      SINUS SURGERY      TONSILLECTOMY      UPPER GASTROINTESTINAL ENDOSCOPY           FAMILY HISTORY  Family History   Problem Relation Age of Onset    Hypertension Mother     Stroke Mother     Stroke Father     Heart failure Father     Benign prostatic hyperplasia Father     Colon cancer Neg Hx     Colon polyps Neg Hx          SOCIAL HISTORY  Social History     Occupational History    Occupation: retired   Tobacco Use    Smoking status: Former     Current packs/day: 0.00     Average packs/day: 1 pack/day for 10.0 years (10.0 ttl pk-yrs)     Types: Cigarettes, Pipe, Cigars     Start date: 1965     Quit date: 1975     Years since quittin.4     Passive exposure: Past    Smokeless tobacco: Never    Tobacco comments:     Caffeine: 2 cups coffee daily   Vaping Use    Vaping status: Never Used   Substance and Sexual Activity    Alcohol use: Yes     Alcohol/week: 1.0 standard drink of alcohol     Types: 1 Cans of beer per week     Comment: social minimal    Drug use: Never    Sexual activity: Yes         CURRENT MEDICATIONS    Current Outpatient Medications:     finasteride (PROSCAR) 5 MG tablet, Take 1 tablet by mouth Daily., Disp: 90 tablet, Rfl: 1    Glucos-Chondroit-Hyaluron-MSM (Glucosamine Chondroitin Joint) tablet, Take  by mouth 2 (Two) Times a Day., Disp: , Rfl:     glucose blood test strip, Use as instructed, Disp: 360 each, Rfl: 12    lisinopril (PRINIVIL,ZESTRIL) 5 MG tablet, Take 1 tablet by mouth Daily., Disp: 90 tablet, Rfl: 1    metFORMIN ER (GLUCOPHAGE-XR) 750 MG 24  "hr tablet, Take 1 tablet by mouth Daily With Breakfast., Disp: 90 tablet, Rfl: 1    multivitamin with minerals (MULTIVITAMIN ADULTS PO), Take 1 tablet by mouth Daily., Disp: , Rfl:     simvastatin (ZOCOR) 20 MG tablet, Take 1 tablet by mouth Every Night., Disp: 90 tablet, Rfl: 1    terazosin (HYTRIN) 10 MG capsule, Take 1 capsule by mouth Every Night., Disp: 90 capsule, Rfl: 1    esomeprazole (nexIUM) 40 MG capsule, Take 1 capsule by mouth 2 (Two) Times a Day., Disp: 180 capsule, Rfl: 3    famotidine (PEPCID) 40 MG tablet, Take 1 tablet by mouth Every Night., Disp: 90 tablet, Rfl: 3    ALLERGIES  Patient has no known allergies.    VITALS  Vitals:    06/11/24 0855   BP: 100/54   BP Location: Left arm   Patient Position: Sitting   Cuff Size: Large Adult   Weight: 81.1 kg (178 lb 12.8 oz)   Height: 185.4 cm (72.99\")       PHYSICAL EXAM  Debilities/Disabilities Identified: None  Emotional Behavior: Appropriate  Wt Readings from Last 3 Encounters:   06/11/24 81.1 kg (178 lb 12.8 oz)   06/05/24 82.1 kg (181 lb)   05/30/24 82.5 kg (181 lb 12.8 oz)     Ht Readings from Last 1 Encounters:   06/11/24 185.4 cm (72.99\")     Body mass index is 23.59 kg/m².  Physical Exam  Constitutional:       General: He is not in acute distress.     Appearance: Normal appearance. He is not ill-appearing.   HENT:      Head: Normocephalic and atraumatic.      Mouth/Throat:      Mouth: Mucous membranes are moist.      Pharynx: No posterior oropharyngeal erythema.   Eyes:      General: No scleral icterus.  Cardiovascular:      Rate and Rhythm: Normal rate and regular rhythm.      Heart sounds: Normal heart sounds.   Pulmonary:      Effort: Pulmonary effort is normal.      Breath sounds: Normal breath sounds.   Abdominal:      General: Abdomen is flat. Bowel sounds are normal. There is no distension.      Palpations: Abdomen is soft. There is no mass.      Tenderness: There is no abdominal tenderness. There is no guarding or rebound. Negative signs " include Pozo's sign.      Hernia: No hernia is present.   Musculoskeletal:      Cervical back: Neck supple.   Skin:     General: Skin is warm.      Capillary Refill: Capillary refill takes less than 2 seconds.   Neurological:      General: No focal deficit present.      Mental Status: He is alert and oriented to person, place, and time.   Psychiatric:         Mood and Affect: Mood normal.         Behavior: Behavior normal.         Thought Content: Thought content normal.         Judgment: Judgment normal.         CLINICAL DATA REVIEWED   reviewed previous lab results and integrated with today's visit, reviewed notes from other physicians and/or last GI encounter, reviewed previous endoscopy results and available photos, reviewed surgical pathology results from previous biopsies    ASSESSMENT  Diagnoses and all orders for this visit:    Laryngopharyngeal reflux (LPR)  -     esomeprazole (nexIUM) 40 MG capsule; Take 1 capsule by mouth 2 (Two) Times a Day.  -     famotidine (PEPCID) 40 MG tablet; Take 1 tablet by mouth Every Night.    Flatulence          PLAN    Switch to esomeprazole BID AC, add famotidine  Plans to raise HOB  Add daily fiber supplement    Return in about 3 months (around 9/11/2024).    I have discussed the above plan with the patient.  They verbalize understanding and are in agreement with the plan.  They have been advised to contact the office for any questions, concerns, or changes related to their health.

## 2024-07-15 ENCOUNTER — OFFICE VISIT (OUTPATIENT)
Dept: ORTHOPEDIC SURGERY | Facility: CLINIC | Age: 74
End: 2024-07-15
Payer: MEDICARE

## 2024-07-15 VITALS — HEIGHT: 73 IN | BODY MASS INDEX: 23.59 KG/M2 | WEIGHT: 178 LBS

## 2024-07-15 DIAGNOSIS — M17.11 PRIMARY OSTEOARTHRITIS OF RIGHT KNEE: Primary | ICD-10-CM

## 2024-07-15 DIAGNOSIS — M25.561 CHRONIC PAIN OF RIGHT KNEE: ICD-10-CM

## 2024-07-15 DIAGNOSIS — G89.29 CHRONIC PAIN OF RIGHT KNEE: ICD-10-CM

## 2024-07-15 PROCEDURE — 1160F RVW MEDS BY RX/DR IN RCRD: CPT | Performed by: ORTHOPAEDIC SURGERY

## 2024-07-15 PROCEDURE — 73562 X-RAY EXAM OF KNEE 3: CPT | Performed by: ORTHOPAEDIC SURGERY

## 2024-07-15 PROCEDURE — 20610 DRAIN/INJ JOINT/BURSA W/O US: CPT | Performed by: ORTHOPAEDIC SURGERY

## 2024-07-15 PROCEDURE — 1159F MED LIST DOCD IN RCRD: CPT | Performed by: ORTHOPAEDIC SURGERY

## 2024-07-15 PROCEDURE — 99214 OFFICE O/P EST MOD 30 MIN: CPT | Performed by: ORTHOPAEDIC SURGERY

## 2024-07-15 RX ORDER — LIDOCAINE HYDROCHLORIDE 10 MG/ML
8 INJECTION, SOLUTION EPIDURAL; INFILTRATION; INTRACAUDAL; PERINEURAL
Status: COMPLETED | OUTPATIENT
Start: 2024-07-15 | End: 2024-07-15

## 2024-07-15 RX ORDER — TRIAMCINOLONE ACETONIDE 40 MG/ML
80 INJECTION, SUSPENSION INTRA-ARTICULAR; INTRAMUSCULAR
Status: COMPLETED | OUTPATIENT
Start: 2024-07-15 | End: 2024-07-15

## 2024-07-15 RX ADMIN — LIDOCAINE HYDROCHLORIDE 8 ML: 10 INJECTION, SOLUTION EPIDURAL; INFILTRATION; INTRACAUDAL; PERINEURAL at 11:25

## 2024-07-15 RX ADMIN — TRIAMCINOLONE ACETONIDE 80 MG: 40 INJECTION, SUSPENSION INTRA-ARTICULAR; INTRAMUSCULAR at 11:25

## 2024-07-15 NOTE — PROGRESS NOTES
Subjective:     Patient ID: Rupesh Fox is a 73 y.o. male.    Chief Complaint:  Right knee pain, new issue to examiner   History of Present Illness  History of Present Illness  The patient presents to clinic today for evaluation of right knee pain.    The patient has been experiencing an acute onset of right knee pain over the past 3 to 4 weeks. He denies any specific injury prior to the onset of the pain. He quantifies his pain level as a 6 to 7 out of 10, however, it had escalated to a 9 to 10 out of 10, leading to difficulty in ambulation and a significant limitation in his activities. Over the past 1 to 2 weeks, the pain has slightly intensified, localized to the medial aspect of the knee. He denies experiencing radiation of pain, numbness, tingling, rachell locking or catching, hip or groin pain. The pain has shown mild improvement with anti-inflammatory medications, moderate improvement with activity modification, and moderate improvement with the use of a brace.     Social History     Occupational History    Occupation: retired   Tobacco Use    Smoking status: Former     Current packs/day: 0.00     Average packs/day: 1 pack/day for 10.0 years (10.0 ttl pk-yrs)     Types: Cigarettes, Pipe, Cigars     Start date: 1965     Quit date: 1975     Years since quittin.5     Passive exposure: Past    Smokeless tobacco: Never    Tobacco comments:     Caffeine: 2 cups coffee daily   Vaping Use    Vaping status: Never Used   Substance and Sexual Activity    Alcohol use: Yes     Alcohol/week: 1.0 standard drink of alcohol     Types: 1 Cans of beer per week     Comment: social minimal    Drug use: Never    Sexual activity: Yes      Past Medical History:   Diagnosis Date    Allergic rhinitis     Arthritis of neck     Ann esophagus 2023    Barretts esophagus     Benign prostatic hyperplasia without lower urinary tract symptoms     Bradycardia, unspecified     Cancer melanoma    Cellulitis and abscess of  "toe     Cervicalgia     Colon polyp     Deviated septum     Diabetes mellitus     Diabetic eye exam     5/14, 6/4/15 - negative, 6/16 - small change in vessel, 5/17 - negative, f/u 12 mo, 3/18 - negative, 4/19 - negative    Dysphagia     Enlarged prostate without lower urinary tract symptoms (luts)     Essential (primary) hypertension     Fracture, femur     Fracture, foot 1972    GERD (gastroesophageal reflux disease)     Gluteal tendinitis, unspecified hip     Goiter     Hearing loss     Hip arthrosis     Hyperlipidemia, unspecified     Knee swelling     Low back strain     Pain in left knee     Pain in right shoulder     Problem with sexual function     Pure hypercholesterolemia     Sprain     Sprain of hip: Sprain of other specified sites of hip and thigh    Tinnitus, unspecified ear     Trochanteric bursitis, left hip     Type 2 diabetes mellitus without complication      Past Surgical History:   Procedure Laterality Date    COLONOSCOPY      COLONOSCOPY N/A 12/06/2017    Procedure: COLONOSCOPY;  Surgeon: Rosalia James MD;  [facility];  Service:     ENDOSCOPY  05/2015    GERD/dysphagia - erosive gastritis, normal esophagus    ENDOSCOPY N/A 02/07/2023    Procedure: ESOPHAGOGASTRODUODENOSCOPY;  Surgeon: Lico Mcmillan MD;  [place] [facility] [place];  Service: Gastroenterology;  Laterality: N/A;  Esophagitis and Gastritis    FLEXIBLE SIGMOIDOSCOPY      FRACTURE SURGERY Left 1972    FX: Femur    LIVER BIOPSY      SEPTOPLASTY      SINUS SURGERY      TONSILLECTOMY      UPPER GASTROINTESTINAL ENDOSCOPY  2015       Family History   Problem Relation Age of Onset    Hypertension Mother     Stroke Mother     Stroke Father     Heart failure Father     Benign prostatic hyperplasia Father     Colon cancer Neg Hx     Colon polyps Neg Hx          Review of Systems        Objective:  Vitals:    07/15/24 1053   Weight: 80.7 kg (178 lb)   Height: 185.4 cm (73\")         07/15/24  1053   Weight: 80.7 kg (178 lb) "     Body mass index is 23.48 kg/m².  Physical Exam    Vital signs reviewed.   General: No acute distress, alert and oriented  Eyes: conjunctiva clear; pupils equally round and reactive  ENT: external ears and nose atraumatic; oropharynx clear  CV: no peripheral edema  Resp: normal respiratory effort  Skin: no rashes or wounds; normal turgor  Psych: mood and affect appropriate; recent and remote memory intact          Physical Exam  Right knee demonstrates active range of motion is 0 to 125 degrees, 4+ out of 5 strength on flexion and extension. Maximal tenderness to palpation along the medial joint line. Positive Maurice's exam with pain along medial joint line, no click. Moderate effusion, stable to varus and valgus stress at 0 and 30 degrees. Grade 1A Lachman, negative anterior and posterior drawer, mildly positive active patellar compression test. No hip pain on logroll or Stinchfield exam.         Imaging:  Right Knee X-Ray  Indication: Pain    AP, Lateral, and [place] 1 views    Findings:  No fracture  No bony lesion  Normal soft tissues  Moderate medial compartment joint space narrowing with mild reactive osteophyte formation particular along medial femoral condyle with slight varus alignment noted, moderate effusion  No prior studies were available for comparison.    Assessment:        1. Primary osteoarthritis of right knee    2. Chronic pain of right knee           Plan:  Large Joint Arthrocentesis: R knee  Date/Time: 7/15/2024 11:25 AM  Consent given by: patient  Site marked: site marked  Timeout: Immediately prior to procedure a time out was called to verify the correct patient, procedure, equipment, support staff and site/side marked as required   Supporting Documentation  Indications: pain   Procedure Details  Location: knee - R knee  Preparation: Patient was prepped and draped in the usual sterile fashion  Needle size: 22 G  Approach: anterolateral  Medications administered: 80 mg triamcinolone  acetonide 40 MG/ML; 8 mL lidocaine PF 1% 1 %  Patient tolerance: patient tolerated the procedure well with no immediate complications              Assessment & Plan  1. Right knee pain.  A comprehensive discussion was held with the patient regarding treatment options. Currently, he has chosen to proceed with an intra-articular injection today. It was recommended that he persist with the use of a brace and modify his activities, given the suspicion of an acute inflammatory reaction to underlying arthritic wear and a potential stress reaction in the bone, considering his symptomatology. The decision was made to proceed with an injection to the right knee today. He will persist with hip, core, and quad strengthening exercises, as well as activity modification and bracing.    Patient would like to proceed with cortisone injection today to the right knee. Recommended limited use of affected extremity for the next 24 hours to only essential activites other than work on general active and passive motion. Recommended supplementing with ice and soft tissue massage. Discussed with patient that they should see results in 5-7 days, if no improvement in 5-6 weeks I have asked them to call the office to review other options. Patient should call office immediately if they notice redness, warmth, fevers, chills, or residual numbness or tingling for greater than 6 hours after injection.       Follow-up  Follow-up visits will be scheduled as necessary, contingent on the patient's response and the necessity for further treatment. The patient concurred with the proposed plan and all his queries were addressed.    Rupesh Fox was in agreement with plan and had all questions answered.     Orders:  Orders Placed This Encounter   Procedures    Large Joint Arthrocentesis: R knee    XR Knee 3+ View With [place] Right       Medications:  No orders of the defined types were placed in this encounter.      Followup:  No follow-ups on  file.    Diagnoses and all orders for this visit:    1. Primary osteoarthritis of right knee (Primary)  -     Large Joint Arthrocentesis: R knee    2. Chronic pain of right knee  -     XR Knee 3+ View With [place] Right          Dictated utilizing Dragon dictation     Patient or patient representative verbalized consent for the use of Ambient Listening during the visit with  Sergo Agustin MD for chart documentation. 7/15/2024  13:15 EDT

## 2024-07-17 DIAGNOSIS — E11.649 TYPE 2 DIABETES MELLITUS WITH HYPOGLYCEMIA WITHOUT COMA, WITH LONG-TERM CURRENT USE OF INSULIN: ICD-10-CM

## 2024-07-17 DIAGNOSIS — I10 ESSENTIAL HYPERTENSION: ICD-10-CM

## 2024-07-17 DIAGNOSIS — Z79.4 TYPE 2 DIABETES MELLITUS WITH HYPOGLYCEMIA WITHOUT COMA, WITH LONG-TERM CURRENT USE OF INSULIN: ICD-10-CM

## 2024-07-17 RX ORDER — METFORMIN HYDROCHLORIDE 750 MG/1
750 TABLET, EXTENDED RELEASE ORAL
Qty: 90 TABLET | Refills: 1 | Status: SHIPPED | OUTPATIENT
Start: 2024-07-17

## 2024-07-17 RX ORDER — LISINOPRIL 5 MG/1
5 TABLET ORAL DAILY
Qty: 90 TABLET | Refills: 1 | Status: SHIPPED | OUTPATIENT
Start: 2024-07-17

## 2024-10-17 ENCOUNTER — OFFICE VISIT (OUTPATIENT)
Dept: ORTHOPEDIC SURGERY | Facility: CLINIC | Age: 74
End: 2024-10-17
Payer: MEDICARE

## 2024-10-17 VITALS — HEIGHT: 73 IN | BODY MASS INDEX: 22.93 KG/M2 | WEIGHT: 173 LBS

## 2024-10-17 DIAGNOSIS — M17.11 PRIMARY OSTEOARTHRITIS OF RIGHT KNEE: Primary | ICD-10-CM

## 2024-10-17 DIAGNOSIS — M79.671 FOOT PAIN, RIGHT: ICD-10-CM

## 2024-10-17 DIAGNOSIS — S86.891A MEDIAL TIBIAL STRESS SYNDROME, RIGHT, INITIAL ENCOUNTER: ICD-10-CM

## 2024-10-17 DIAGNOSIS — M17.12 PRIMARY OSTEOARTHRITIS OF LEFT KNEE: ICD-10-CM

## 2024-10-17 PROCEDURE — 99214 OFFICE O/P EST MOD 30 MIN: CPT | Performed by: ORTHOPAEDIC SURGERY

## 2024-10-17 PROCEDURE — 20610 DRAIN/INJ JOINT/BURSA W/O US: CPT | Performed by: ORTHOPAEDIC SURGERY

## 2024-10-17 RX ORDER — PREDNISONE 10 MG/1
TABLET ORAL
Qty: 39 TABLET | Refills: 0 | Status: SHIPPED | OUTPATIENT
Start: 2024-10-17 | End: 2024-10-31

## 2024-10-17 RX ADMIN — LIDOCAINE HYDROCHLORIDE 8 ML: 10 INJECTION, SOLUTION EPIDURAL; INFILTRATION; INTRACAUDAL; PERINEURAL at 11:39

## 2024-10-17 RX ADMIN — TRIAMCINOLONE ACETONIDE 80 MG: 40 INJECTION, SUSPENSION INTRA-ARTICULAR; INTRAMUSCULAR at 11:39

## 2024-10-17 NOTE — PROGRESS NOTES
Subjective:     Patient ID: Rupesh Fox is a 74 y.o. male.    Chief Complaint:  Follow-up bilateral knee pain, DJD  Last injection-right knee-7/15/2024    History of Present Illness  History of Present Illness  Bakari returns to clinic today for follow-up evaluation in regards to bilateral knee pain but most significantly pain on the medial aspect of his right knee extending from the medial joint line down the medial proximal tibia, he denies any numbness or tingling to his right lower extremity.  Rates current level pain as an 8-9 out of 10, describes aching nature, significantly exacerbated with prolonged walking, weightbearing, deep flexion activities.  Does note some occasional catching sensation but denies any rachell locking.  Moderate swelling does wax and wane.  Denies any hip or groin pain at this point in time.  Left knee is doing reasonably well at this point in time with some intermittent moderate pain.  He denies any fevers chills or sweats.  Denies any radiation of pain from the knees themselves     Social History     Occupational History    Occupation: retired   Tobacco Use    Smoking status: Former     Current packs/day: 0.00     Average packs/day: 1 pack/day for 10.0 years (10.0 ttl pk-yrs)     Types: Cigarettes, Pipe, Cigars     Start date: 1965     Quit date: 1975     Years since quittin.9     Passive exposure: Past    Smokeless tobacco: Never    Tobacco comments:     Caffeine: 2 cups coffee daily   Vaping Use    Vaping status: Never Used   Substance and Sexual Activity    Alcohol use: Yes     Alcohol/week: 1.0 standard drink of alcohol     Types: 1 Cans of beer per week     Comment: social minimal    Drug use: Never    Sexual activity: Yes      Past Medical History:   Diagnosis Date    Allergic rhinitis     Arthritis of back     Arthritis of neck     Ann esophagus 2023    Barretts esophagus     Benign prostatic hyperplasia without lower urinary tract symptoms     Bradycardia,  unspecified     Cancer melanoma    Cellulitis and abscess of toe     Cervicalgia     Colon polyp     Deviated septum     Diabetes mellitus     Diabetic eye exam     5/14, 6/4/15 - negative, 6/16 - small change in vessel, 5/17 - negative, f/u 12 mo, 3/18 - negative, 4/19 - negative    Dysphagia     Enlarged prostate without lower urinary tract symptoms (luts)     Essential (primary) hypertension     Fracture, femur     Fracture, foot 1972    GERD (gastroesophageal reflux disease)     Gluteal tendinitis, unspecified hip     Goiter     Hearing loss     Hip arthrosis     Hyperlipidemia, unspecified     Knee swelling     Low back strain     Pain in left knee     Pain in right shoulder     Problem with sexual function     Pure hypercholesterolemia     Sprain     Sprain of hip: Sprain of other specified sites of hip and thigh    Tinnitus, unspecified ear     Trochanteric bursitis, left hip     Type 2 diabetes mellitus without complication      Past Surgical History:   Procedure Laterality Date    COLONOSCOPY      COLONOSCOPY N/A 12/06/2017    Procedure: COLONOSCOPY;  Surgeon: Rosalia James MD;  Location: [facility] OR;  Service:     ENDOSCOPY  05/2015    GERD/dysphagia - erosive gastritis, normal esophagus    ENDOSCOPY N/A 02/07/2023    Procedure: ESOPHAGOGASTRODUODENOSCOPY;  Surgeon: Lico Mcmillan MD;  Location: [facility] OR;  Service: Gastroenterology;  Laterality: N/A;  Esophagitis and Gastritis    FLEXIBLE SIGMOIDOSCOPY      FRACTURE SURGERY Left 1972    FX: Femur    LIVER BIOPSY      SEPTOPLASTY      SINUS SURGERY      TONSILLECTOMY      UPPER GASTROINTESTINAL ENDOSCOPY  2015       Family History   Problem Relation Age of Onset    Hypertension Mother     Stroke Mother     Stroke Father     Heart failure Father     Benign prostatic hyperplasia Father     Colon cancer Neg Hx     Colon polyps Neg Hx          Review of Systems        Objective:  Vitals:    10/17/24 1057   Weight: 78.5 kg (173 lb)   Height:  "185.4 cm (73\")         10/17/24  1057   Weight: 78.5 kg (173 lb)     Body mass index is 22.82 kg/m².  Physical Exam    Vital signs reviewed.   General: No acute distress, alert and oriented  Eyes: conjunctiva clear; pupils equally round and reactive  ENT: external ears and nose atraumatic; oropharynx clear  CV: no peripheral edema  Resp: normal respiratory effort  Skin: no rashes or wounds; normal turgor  Psych: mood and affect appropriate; recent and remote memory intact          Physical Exam         Right knee demonstrates active range of motion is 0 to 125 degrees, 4+ out of 5 strength on flexion and extension. Maximal tenderness to palpation along the medial joint line. Positive Maurice's exam with pain along medial joint line, no click. Moderate effusion, stable to varus and valgus stress at 0 and 30 degrees. Grade 1A Lachman, negative anterior and posterior drawer, mildly positive active patellar compression test. No hip pain on logroll or Stinchfield exam.     Imaging:  None today  Assessment:        1. Primary osteoarthritis of right knee    2. Primary osteoarthritis of left knee    3. Medial tibial stress syndrome, right, initial encounter    4. Foot pain, right           Plan:      Large Joint Arthrocentesis: R knee  Date/Time: 10/17/2024 11:39 AM  Consent given by: patient  Site marked: site marked  Timeout: Immediately prior to procedure a time out was called to verify the correct patient, procedure, equipment, support staff and site/side marked as required   Supporting Documentation  Indications: pain   Procedure Details  Location: knee - R knee  Preparation: Patient was prepped and draped in the usual sterile fashion  Needle size: 22 G  Approach: anterolateral  Medications administered: 8 mL lidocaine PF 1% 1 %; 80 mg triamcinolone acetonide 40 MG/ML  Patient tolerance: patient tolerated the procedure well with no immediate complications          Assessment & Plan  Given significant exacerbation of " pain to the right knee we discussed treatment options.  Given his acute levels of pain we will proceed with a topical anti-inflammatory compounded cream with covering for him today as well as intra-articular injections of the right knee.  His pain is not responding as well to injections of what he previously expected so we will go ahead and proceed with advanced imaging as well to evaluate for any chondral loss that may be more advanced than as suggested by his x-rays.  He was in agreement with this plan, MRI ordered today.  I will follow-up with him over the phone based on MRI discuss further treatment options at that time.    Patient would like to proceed with cortisone injection today to the right knee. Recommended limited use of affected extremity for the next 24 hours to only essential activites other than work on general active and passive motion. Recommended supplementing with ice and soft tissue massage. Discussed with patient that they should see results in 5-7 days, if no improvement in 5-6 weeks I have asked them to call the office to review other options. Patient should call office immediately if they notice redness, warmth, fevers, chills, or residual numbness or tingling for greater than 6 hours after injection.       Patient also requested referral to podiatry given chronic foot pain.    Rupesh Schultzen was in agreement with plan and had all questions answered.     Orders:  Orders Placed This Encounter   Procedures    Large Joint Arthrocentesis: R knee    MRI Knee Right Without Contrast    Ambulatory Referral to Podiatry       Medications:  New Medications Ordered This Visit   Medications    Ibuprofen 3 %, Gabapentin 10 %, Baclofen 2 %, lidocaine 4 %, Ketamine HCl 4 %     Sig: Apply 1-2 g topically to the appropriate area as directed 3 (Three) to 4 (Four) times daily.     Dispense:  90 g     Refill:  2     May substitute alternative formula as coverage requires, Ketamine 4%, Ibuprofen 3%, Baclofen 2%,  Gabapentin 10%, Lidocaine 4%       Followup:  No follow-ups on file.    Diagnoses and all orders for this visit:    1. Primary osteoarthritis of right knee (Primary)  -     MRI Knee Right Without Contrast; Future  -     Ibuprofen 3 %, Gabapentin 10 %, Baclofen 2 %, lidocaine 4 %, Ketamine HCl 4 %; Apply 1-2 g topically to the appropriate area as directed 3 (Three) to 4 (Four) times daily.  Dispense: 90 g; Refill: 2  -     Large Joint Arthrocentesis: R knee    2. Primary osteoarthritis of left knee  -     Ibuprofen 3 %, Gabapentin 10 %, Baclofen 2 %, lidocaine 4 %, Ketamine HCl 4 %; Apply 1-2 g topically to the appropriate area as directed 3 (Three) to 4 (Four) times daily.  Dispense: 90 g; Refill: 2    3. Medial tibial stress syndrome, right, initial encounter  -     MRI Knee Right Without Contrast; Future  -     Ibuprofen 3 %, Gabapentin 10 %, Baclofen 2 %, lidocaine 4 %, Ketamine HCl 4 %; Apply 1-2 g topically to the appropriate area as directed 3 (Three) to 4 (Four) times daily.  Dispense: 90 g; Refill: 2    4. Foot pain, right  -     Ambulatory Referral to Podiatry    Other orders  -     Discontinue: predniSONE (DELTASONE) 10 MG tablet; 60 mg po daily x 3 days, then 40 mg po daily x 3 days, then 20 mg po daily x 3 days, then 10 mg po daily x 3 days  Dispense: 39 tablet; Refill: 0  -     Discontinue: predniSONE (DELTASONE) 10 MG tablet; 60 mg po daily x 3 days, then 40 mg po daily x 3 days, then 20 mg po daily x 3 days, then 10 mg po daily x 3 days  Dispense: 39 tablet; Refill: 0          BMI is within normal parameters. No other follow-up for BMI required.       Dictated utilizing Dragon dictation     Patient or patient representative verbalized consent for the use of Ambient Listening during the visit with  Sergo Agustin MD for chart documentation. 11/18/2024  11:07 EDT

## 2024-11-06 ENCOUNTER — HOSPITAL ENCOUNTER (OUTPATIENT)
Dept: MRI IMAGING | Facility: HOSPITAL | Age: 74
Discharge: HOME | End: 2024-11-06
Admitting: ORTHOPAEDIC SURGERY
Payer: MEDICARE

## 2024-11-06 ENCOUNTER — OFFICE VISIT (OUTPATIENT)
Dept: INTERNAL MEDICINE | Facility: CLINIC | Age: 74
End: 2024-11-06
Payer: MEDICARE

## 2024-11-06 VITALS
DIASTOLIC BLOOD PRESSURE: 70 MMHG | WEIGHT: 175.6 LBS | SYSTOLIC BLOOD PRESSURE: 120 MMHG | HEIGHT: 73 IN | BODY MASS INDEX: 23.27 KG/M2 | HEART RATE: 59 BPM | OXYGEN SATURATION: 96 %

## 2024-11-06 DIAGNOSIS — Z12.5 PROSTATE CANCER SCREENING: ICD-10-CM

## 2024-11-06 DIAGNOSIS — E78.2 MIXED HYPERLIPIDEMIA: ICD-10-CM

## 2024-11-06 DIAGNOSIS — Z79.4 TYPE 2 DIABETES MELLITUS WITH HYPOGLYCEMIA WITHOUT COMA, WITH LONG-TERM CURRENT USE OF INSULIN: ICD-10-CM

## 2024-11-06 DIAGNOSIS — I10 ESSENTIAL HYPERTENSION: Primary | ICD-10-CM

## 2024-11-06 DIAGNOSIS — Z23 IMMUNIZATION DUE: ICD-10-CM

## 2024-11-06 DIAGNOSIS — S86.891A MEDIAL TIBIAL STRESS SYNDROME, RIGHT, INITIAL ENCOUNTER: ICD-10-CM

## 2024-11-06 DIAGNOSIS — M17.11 PRIMARY OSTEOARTHRITIS OF RIGHT KNEE: ICD-10-CM

## 2024-11-06 DIAGNOSIS — E11.649 TYPE 2 DIABETES MELLITUS WITH HYPOGLYCEMIA WITHOUT COMA, WITH LONG-TERM CURRENT USE OF INSULIN: ICD-10-CM

## 2024-11-06 DIAGNOSIS — Z00.00 MEDICARE ANNUAL WELLNESS VISIT, SUBSEQUENT: ICD-10-CM

## 2024-11-06 PROCEDURE — 73721 MRI JNT OF LWR EXTRE W/O DYE: CPT

## 2024-11-06 NOTE — PROGRESS NOTES
Subjective   The ABCs of the Annual Wellness Visit  Medicare Wellness Visit      Rupesh Fox is a 74 y.o. patient who presents for a Medicare Wellness Visit.    The following portions of the patient's history were reviewed and   updated as appropriate: allergies, current medications, past family history, past medical history, past social history, past surgical history, and problem list.    Compared to one year ago, the patient's physical   health is the same.  Compared to one year ago, the patient's mental   health is the same.    Recent Hospitalizations:  He was not admitted to the hospital during the last year.     Current Medical Providers:  Patient Care Team:  Leland Carson MD as PCP - General (Internal Medicine)    Outpatient Medications Prior to Visit   Medication Sig Dispense Refill    esomeprazole (nexIUM) 40 MG capsule Take 1 capsule by mouth 2 (Two) Times a Day. 180 capsule 3    famotidine (PEPCID) 40 MG tablet Take 1 tablet by mouth Every Night. 90 tablet 3    finasteride (PROSCAR) 5 MG tablet Take 1 tablet by mouth Daily. 90 tablet 1    Glucos-Chondroit-Hyaluron-MSM (Glucosamine Chondroitin Joint) tablet Take  by mouth 2 (Two) Times a Day.      glucose blood test strip Use as instructed 360 each 12    Ibuprofen 3 %, Gabapentin 10 %, Baclofen 2 %, lidocaine 4 %, Ketamine HCl 4 % Apply 1-2 g topically to the appropriate area as directed 3 (Three) to 4 (Four) times daily. 90 g 2    lisinopril (PRINIVIL,ZESTRIL) 5 MG tablet TAKE 1 TABLET BY MOUTH EVERY DAY 90 tablet 1    metFORMIN ER (GLUCOPHAGE-XR) 750 MG 24 hr tablet TAKE 1 TABLET BY MOUTH EVERY DAY WITH BREAKFAST 90 tablet 1    multivitamin with minerals (MULTIVITAMIN ADULTS PO) Take 1 tablet by mouth Daily.      simvastatin (ZOCOR) 20 MG tablet Take 1 tablet by mouth Every Night. 90 tablet 1    terazosin (HYTRIN) 10 MG capsule Take 1 capsule by mouth Every Night. 90 capsule 1     No facility-administered medications prior to visit.     No opioid  "medication identified on active medication list. I have reviewed chart for other potential  high risk medication/s and harmful drug interactions in the elderly.      Aspirin is not on active medication list.  Aspirin use is not indicated based on review of current medical condition/s. Risk of harm outweighs potential benefits.  .    Patient Active Problem List   Diagnosis    Hx of colonic polyps    Acid reflux    Can't get food down    Gastrointestinal ulcer due to Helicobacter pylori    Gastroesophageal reflux disease with esophagitis without hemorrhage    Ann's esophagus without dysplasia    Type 2 diabetes mellitus without complication, without long-term current use of insulin    Mixed hyperlipidemia    Primary hypertension     Advance Care Planning Advance Directive is not on file.  ACP discussion was held with the patient during this visit. Patient has an advance directive (not in EMR), copy requested.            Objective   Vitals:    24 1007   BP: 120/70   BP Location: Right arm   Patient Position: Sitting   Cuff Size: Adult   Pulse: 59   SpO2: 96%   Weight: 79.7 kg (175 lb 9.6 oz)   Height: 185.4 cm (73\")       Estimated body mass index is 23.17 kg/m² as calculated from the following:    Height as of this encounter: 185.4 cm (73\").    Weight as of this encounter: 79.7 kg (175 lb 9.6 oz).    BMI is within normal parameters. No other follow-up for BMI required.       Does the patient have evidence of cognitive impairment? No                                                                                                Health  Risk Assessment    Smoking Status:  Social History     Tobacco Use   Smoking Status Former    Current packs/day: 0.00    Average packs/day: 1 pack/day for 10.0 years (10.0 ttl pk-yrs)    Types: Cigarettes, Pipe, Cigars    Start date: 1965    Quit date: 1975    Years since quittin.8    Passive exposure: Past   Smokeless Tobacco Never   Tobacco Comments    Caffeine: 2 " cups coffee daily     Alcohol Consumption:  Social History     Substance and Sexual Activity   Alcohol Use Yes    Alcohol/week: 1.0 standard drink of alcohol    Types: 1 Cans of beer per week    Comment: social minimal       Fall Risk Screen  STEADI Fall Risk Assessment was completed, and patient is at LOW risk for falls.Assessment completed on:2024    Depression Screenin/6/2024    10:09 AM   PHQ-2/PHQ-9 Depression Screening   Little interest or pleasure in doing things Not at all   Feeling down, depressed, or hopeless Not at all   How difficult have these problems made it for you to do your work, take care of things at home, or get along with other people? Not difficult at all     Health Habits and Functional and Cognitive Screening:      10/30/2024     6:28 PM   Functional & Cognitive Status   Do you have difficulty preparing food and eating? No    Do you have difficulty bathing yourself, getting dressed or grooming yourself? No    Do you have difficulty using the toilet? No    Do you have difficulty moving around from place to place? No    Do you have trouble with steps or getting out of a bed or a chair? No    Current Diet Well Balanced Diet    Dental Exam Up to date    Eye Exam Up to date    Exercise (times per week) 3 times per week    Current Exercises Include Gardening;Yard Work    Do you need help using the phone?  No    Are you deaf or do you have serious difficulty hearing?  No    Do you need help to go to places out of walking distance? No    Do you need help shopping? No    Do you need help preparing meals?  No    Do you need help with housework?  No    Do you need help with laundry? No    Do you need help taking your medications? No    Do you need help managing money? No    Do you ever drive or ride in a car without wearing a seat belt? No    Have you felt unusual stress, anger or loneliness in the last month? No    Who do you live with? Spouse    If you need help, do you have trouble  finding someone available to you? No    Have you been bothered in the last four weeks by sexual problems? No    Do you have difficulty concentrating, remembering or making decisions? No        Patient-reported           Age-appropriate Screening Schedule:  Refer to the list below for future screening recommendations based on patient's age, sex and/or medical conditions. Orders for these recommended tests are listed in the plan section. The patient has been provided with a written plan.    Health Maintenance List  Health Maintenance   Topic Date Due    ZOSTER VACCINE (3 of 3) 02/24/2022    DIABETIC FOOT EXAM  07/06/2024    COVID-19 Vaccine (4 - 2024-25 season) 09/01/2024    URINE MICROALBUMIN  10/20/2024    HEMOGLOBIN A1C  11/30/2024    COLORECTAL CANCER SCREENING  11/27/2024 (Originally 1950)    DIABETIC EYE EXAM  04/01/2025    LIPID PANEL  05/30/2025    ANNUAL WELLNESS VISIT  11/06/2025    TDAP/TD VACCINES (2 - Td or Tdap) 01/14/2030    HEPATITIS C SCREENING  Completed    RSV Vaccine - Adults  Completed    INFLUENZA VACCINE  Completed    Pneumococcal Vaccine 65+  Completed    AAA SCREEN (ONE-TIME)  Completed                                                                                                                                                CMS Preventative Services Quick Reference  Risk Factors Identified During Encounter  Immunizations Discussed/Encouraged: Influenza    The above risks/problems have been discussed with the patient.  Pertinent information has been shared with the patient in the After Visit Summary.  An After Visit Summary and PPPS were made available to the patient.    Follow Up:   Next Medicare Wellness visit to be scheduled in 1 year.         Additional E&M Note during same encounter follows:  Patient has additional, significant, and separately identifiable condition(s)/problem(s) that require work above and beyond the Medicare Wellness Visit     Chief Complaint  Follow-up  "(MWV)    Subjective   HPI  Bakari is also being seen today for additional medical problem/s.    Review of Systems   All other systems reviewed and are negative.     T2DM controlled on metformin   HLD on simvastatin  BPH on proscar and terazosin, still with LUTS         Objective   Vital Signs:  /70 (BP Location: Right arm, Patient Position: Sitting, Cuff Size: Adult)   Pulse 59   Ht 185.4 cm (73\")   Wt 79.7 kg (175 lb 9.6 oz)   SpO2 96%   BMI 23.17 kg/m²   Physical Exam  Vitals and nursing note reviewed.   Constitutional:       General: He is not in acute distress.     Appearance: Normal appearance. He is not toxic-appearing.   HENT:      Head: Normocephalic and atraumatic.      Right Ear: Tympanic membrane, ear canal and external ear normal.      Left Ear: Tympanic membrane, ear canal and external ear normal.      Nose: Nose normal. No congestion or rhinorrhea.      Mouth/Throat:      Mouth: Mucous membranes are moist.      Pharynx: No oropharyngeal exudate.   Eyes:      General: No scleral icterus.        Right eye: No discharge.         Left eye: No discharge.      Extraocular Movements: Extraocular movements intact.      Conjunctiva/sclera: Conjunctivae normal.      Pupils: Pupils are equal, round, and reactive to light.   Cardiovascular:      Rate and Rhythm: Normal rate and regular rhythm.      Pulses: Normal pulses.      Heart sounds: Normal heart sounds. No murmur heard.  Pulmonary:      Effort: Pulmonary effort is normal. No respiratory distress.      Breath sounds: Normal breath sounds. No wheezing.   Abdominal:      General: Abdomen is flat. Bowel sounds are normal. There is no distension.      Palpations: Abdomen is soft.      Tenderness: There is no abdominal tenderness. There is no guarding.   Musculoskeletal:         General: No swelling, tenderness or deformity. Normal range of motion.      Cervical back: Normal range of motion and neck supple. No rigidity or tenderness.   Lymphadenopathy: "      Cervical: No cervical adenopathy.   Skin:     General: Skin is warm.      Capillary Refill: Capillary refill takes less than 2 seconds.   Neurological:      General: No focal deficit present.      Mental Status: He is alert and oriented to person, place, and time. Mental status is at baseline.      Cranial Nerves: No cranial nerve deficit.      Gait: Gait normal.   Psychiatric:         Mood and Affect: Mood normal.         Behavior: Behavior normal.         Thought Content: Thought content normal.         Judgment: Judgment normal.         The following data was reviewed by: Leland Carson MD on 11/06/2024:        Assessment and Plan Additional age appropriate preventative wellness advice topics were discussed during today's preventative wellness exam(some topics already addressed during AWV portion of the note above):    Physical Activity: Advised cardiovascular activity 150 minutes per week as tolerated. (example brisk walk for 30 minutes, 5 days a week).     Nutrition: Discussed nutrition plan with patient. Information shared in after visit summary. Goal is for a well balanced diet to enhance overall health.     Healthy Weight: Discussed current and goal BMI with patient. Steps to attain this goal discussed. Information shared in after visit summary.              Essential hypertension  Hypertension is stable and controlled  Continue current treatment regimen.  Blood pressure will be reassessed in 3 months.  Type 2 diabetes mellitus with hypoglycemia without coma, with long-term current use of insulin  Diabetes is stable.   Continue current treatment regimen.  Diabetes will be reassessed in 3 months  Mixed hyperlipidemia   Lipid abnormalities are stable    Plan:  Continue same medication/s without change.      Discussed medication dosage, use, side effects, and goals of treatment in detail.    Counseled patient on lifestyle modifications to help control hyperlipidemia.     Patient Treatment Goals:   LDL goal is  under 100    Followup in 3 months  Medicare annual wellness visit, subsequent    Prostate cancer screening    Immunization due      Orders Placed This Encounter   Procedures    Fluzone High-Dose 65+yrs ([id number])    Comprehensive metabolic panel     Order Specific Question:   Release to patient     Answer:   Routine Release [[id number]]    Lipid panel     Order Specific Question:   Release to patient     Answer:   Routine Release [[id number]]    Hemoglobin A1c     Order Specific Question:   Release to patient     Answer:   Routine Release [[id number]]    TSH     Order Specific Question:   Release to patient     Answer:   Routine Release [[id number]]    Microalbumin / Creatinine Urine Ratio - Urine, Clean Catch     Order Specific Question:   Release to patient     Answer:   Routine Release [[id number]]    PSA SCREENING     Order Specific Question:   Release to patient     Answer:   Routine Release [[id number]]    CBC w AUTO Differential     Order Specific Question:   Manual Differential     Answer:   No     Order Specific Question:   Release to patient     Answer:   Routine Release [[id number]]           I spent 20 minutes caring for Rupesh on this date of service. This time includes time spent by me in the following activities:preparing for the visit, reviewing tests, obtaining and/or reviewing a separately obtained history, performing a medically appropriate examination and/or evaluation , counseling and educating the patient/family/caregiver, ordering medications, tests, or procedures, and documenting information in the medical record  Follow Up   F/u 3 months  Patient was given instructions and counseling regarding his condition or for health maintenance advice. Please see specific information pulled into the AVS if appropriate.    Leland Carson MD  [facility]  [address]  Phone: [phone]

## 2024-11-06 NOTE — ASSESSMENT & PLAN NOTE
Lipid abnormalities are stable    Plan:  Continue same medication/s without change.      Discussed medication dosage, use, side effects, and goals of treatment in detail.    Counseled patient on lifestyle modifications to help control hyperlipidemia.     Patient Treatment Goals:   LDL goal is under 100    Followup in 3 months

## 2024-11-07 LAB
ALBUMIN SERPL-MCNC: 4.3 G/DL (ref 3.5–5.2)
ALBUMIN/CREAT UR: <3 MG/G CREAT (ref 0–29)
ALBUMIN/GLOB SERPL: 1.9 G/DL
ALP SERPL-CCNC: 75 U/L (ref 39–117)
ALT SERPL-CCNC: 18 U/L (ref 1–41)
AST SERPL-CCNC: 24 U/L (ref 1–40)
BASOPHILS # BLD AUTO: 0.03 10*3/MM3 (ref 0–0.2)
BASOPHILS NFR BLD AUTO: 0.5 % (ref 0–1.5)
BILIRUB SERPL-MCNC: 0.6 MG/DL (ref 0–1.2)
BUN SERPL-MCNC: 25 MG/DL (ref 8–23)
BUN/CREAT SERPL: 22.5 (ref 7–25)
CALCIUM SERPL-MCNC: 9.6 MG/DL (ref 8.6–10.5)
CHLORIDE SERPL-SCNC: 105 MMOL/L (ref 98–107)
CHOLEST SERPL-MCNC: 137 MG/DL (ref 0–200)
CO2 SERPL-SCNC: 28.7 MMOL/L (ref 22–29)
CREAT SERPL-MCNC: 1.11 MG/DL (ref 0.76–1.27)
CREAT UR-MCNC: 105.6 MG/DL
EGFRCR SERPLBLD CKD-EPI 2021: 69.2 ML/MIN/1.73
EOSINOPHIL # BLD AUTO: 0.07 10*3/MM3 (ref 0–0.4)
EOSINOPHIL NFR BLD AUTO: 1.1 % (ref 0.3–6.2)
ERYTHROCYTE [DISTWIDTH] IN BLOOD BY AUTOMATED COUNT: 11.9 % (ref 12.3–15.4)
GLOBULIN SER CALC-MCNC: 2.3 GM/DL
GLUCOSE SERPL-MCNC: 95 MG/DL (ref 65–99)
HBA1C MFR BLD: 6.3 % (ref 4.8–5.6)
HCT VFR BLD AUTO: 38.8 % (ref 37.5–51)
HDLC SERPL-MCNC: 60 MG/DL (ref 40–60)
HGB BLD-MCNC: 13 G/DL (ref 13–17.7)
IMM GRANULOCYTES # BLD AUTO: 0.01 10*3/MM3 (ref 0–0.05)
IMM GRANULOCYTES NFR BLD AUTO: 0.2 % (ref 0–0.5)
LDLC SERPL CALC-MCNC: 65 MG/DL (ref 0–100)
LYMPHOCYTES # BLD AUTO: 2.47 10*3/MM3 (ref 0.7–3.1)
LYMPHOCYTES NFR BLD AUTO: 37.1 % (ref 19.6–45.3)
MCH RBC QN AUTO: 31.9 PG (ref 26.6–33)
MCHC RBC AUTO-ENTMCNC: 33.5 G/DL (ref 31.5–35.7)
MCV RBC AUTO: 95.3 FL (ref 79–97)
MICROALBUMIN UR-MCNC: <3 UG/ML
MONOCYTES # BLD AUTO: 0.59 10*3/MM3 (ref 0.1–0.9)
MONOCYTES NFR BLD AUTO: 8.9 % (ref 5–12)
NEUTROPHILS # BLD AUTO: 3.48 10*3/MM3 (ref 1.7–7)
NEUTROPHILS NFR BLD AUTO: 52.2 % (ref 42.7–76)
NRBC BLD AUTO-RTO: 0 /100 WBC (ref 0–0.2)
PLATELET # BLD AUTO: 167 10*3/MM3 (ref 140–450)
POTASSIUM SERPL-SCNC: 5.1 MMOL/L (ref 3.5–5.2)
PROT SERPL-MCNC: 6.6 G/DL (ref 6–8.5)
PSA SERPL-MCNC: 0.57 NG/ML (ref 0–4)
RBC # BLD AUTO: 4.07 10*6/MM3 (ref 4.14–5.8)
SODIUM SERPL-SCNC: 139 MMOL/L (ref 136–145)
TRIGL SERPL-MCNC: 53 MG/DL (ref 0–150)
TSH SERPL DL<=0.005 MIU/L-ACNC: 1.18 UIU/ML (ref 0.27–4.2)
VLDLC SERPL CALC-MCNC: 12 MG/DL (ref 5–40)
WBC # BLD AUTO: 6.65 10*3/MM3 (ref 3.4–10.8)

## 2024-11-11 ENCOUNTER — TELEPHONE (OUTPATIENT)
Dept: ORTHOPEDIC SURGERY | Facility: CLINIC | Age: 74
End: 2024-11-11
Payer: MEDICARE

## 2024-11-11 NOTE — TELEPHONE ENCOUNTER
PATIENT CALLED TO FOLLOW UP ON HIS MRI RESULTS. STATED THAT HE BELIEVED PROVIDER STATED HE WOULD CALL HIM WITH THE RESULTS.

## 2024-11-18 ENCOUNTER — PREP FOR SURGERY (OUTPATIENT)
Dept: OTHER | Facility: HOSPITAL | Age: 74
End: 2024-11-18
Payer: MEDICARE

## 2024-11-18 DIAGNOSIS — M17.11 PRIMARY OSTEOARTHRITIS OF RIGHT KNEE: Primary | ICD-10-CM

## 2024-11-18 RX ORDER — ACETAMINOPHEN 500 MG
1000 TABLET ORAL ONCE
OUTPATIENT
Start: 2024-11-18 | End: 2024-11-18

## 2024-11-18 RX ORDER — TRANEXAMIC ACID 10 MG/ML
1000 INJECTION, SOLUTION INTRAVENOUS ONCE
OUTPATIENT
Start: 2024-11-18 | End: 2024-11-18

## 2024-11-18 RX ORDER — MELOXICAM 7.5 MG/1
15 TABLET ORAL ONCE
OUTPATIENT
Start: 2024-11-18 | End: 2024-11-18

## 2024-11-18 RX ORDER — PREGABALIN 75 MG/1
150 CAPSULE ORAL ONCE
OUTPATIENT
Start: 2024-11-18 | End: 2024-11-18

## 2024-11-18 RX ORDER — LIDOCAINE HYDROCHLORIDE 10 MG/ML
8 INJECTION, SOLUTION EPIDURAL; INFILTRATION; INTRACAUDAL; PERINEURAL
Status: COMPLETED | OUTPATIENT
Start: 2024-10-17 | End: 2024-10-17

## 2024-11-18 RX ORDER — TRIAMCINOLONE ACETONIDE 40 MG/ML
80 INJECTION, SUSPENSION INTRA-ARTICULAR; INTRAMUSCULAR
Status: COMPLETED | OUTPATIENT
Start: 2024-10-17 | End: 2024-10-17

## 2024-11-19 ENCOUNTER — OFFICE VISIT (OUTPATIENT)
Dept: GASTROENTEROLOGY | Facility: CLINIC | Age: 74
End: 2024-11-19
Payer: MEDICARE

## 2024-11-19 VITALS
BODY MASS INDEX: 23.38 KG/M2 | WEIGHT: 176.4 LBS | SYSTOLIC BLOOD PRESSURE: 112 MMHG | DIASTOLIC BLOOD PRESSURE: 76 MMHG | HEIGHT: 73 IN

## 2024-11-19 DIAGNOSIS — K21.00 GASTROESOPHAGEAL REFLUX DISEASE WITH ESOPHAGITIS WITHOUT HEMORRHAGE: Primary | ICD-10-CM

## 2024-11-19 DIAGNOSIS — Z12.11 ENCOUNTER FOR SCREENING FOR MALIGNANT NEOPLASM OF COLON: ICD-10-CM

## 2024-11-19 DIAGNOSIS — K22.70 BARRETT'S ESOPHAGUS WITHOUT DYSPLASIA: ICD-10-CM

## 2024-11-19 NOTE — PROGRESS NOTES
PATIENT INFORMATION  Rupesh Fox       - 1950    CHIEF COMPLAINT  Chief Complaint   Patient presents with    Follow-up     Barretts; LPR       HISTORY OF PRESENT ILLNESS    ere today for Barretts follow-up     Bowels: Still having gas, taking probiotic with no improvement. Not taking fiber supplement, reviewed and will start benefiber. Gas for 1-2 yrs which does not bother him. Taking benefiber BID. Bowels are about the same, still passing some gas. Bowels move once a day. Some constipation when switched to esomeprazole, but that has resolved.     GERD: Continued BID PPI, at LOV tried to add pepcid with no resolve, still drainage after work out and meals. Pepcid did not help. Uncommon for food to hang up, doing well. Belching improved from LOV.      2023 for reflux esophagitis, positive for gastritis, reflux with barretts, negative for HP, dysplasia.    Last colon 2017 normal with hemorrhoids. In  had 7 adenomas removed. One in between not sure whether there polyps. Will  plan to repeat colonoscopy this year.          REVIEWED PERTINENT RESULTS/ LABS  Lab Results   Component Value Date    CASEREPORT  2023     Surgical Pathology Report                         Case: [id number]                                  Authorizing Provider:  Lico Mcmillan        Collected:           2023 02:03 PM                                 MD Saqib                                                                   Ordering Location:     [facility]     Received:            2023 02:36 PM                                 [facility] MAIN OR                                                     Pathologist:           Gallo Zhang MD                                                         Specimens:   1) - Gastric, Antrum, Gastric biopsy                                                                2) - Esophagus, Distal, distal Esophagus biopsy                                             FINALDX  02/07/2023     1. Stomach, Biopsy: Benign gastric mucosa with    A. Mild chronic inflammation.   B. Intestinal metaplasia with no dysplasia.   C. No Helicobacter pylori.    D. Reactive changes of the epithelium.    2. Esophagus, Distal, Biopsy: Benign squamous and gastric mucosa with   A. Intestinal metaplasia consistent with Ann's esophagus with reactive changes and no dysplasia.   B. Extensive chronic inflammation of the gastric mucosa.   C. No Helicobacter pylori.    jose a/pkm        Lab Results   Component Value Date    HGB 13.0 11/06/2024    MCV 95.3 11/06/2024     11/06/2024    ALT 18 11/06/2024    AST 24 11/06/2024    HGBA1C 6.30 (H) 11/06/2024    TRIG 53 11/06/2024      MRI Knee Right Without Contrast    Result Date: 11/6/2024  Narrative: MRI KNEE RIGHT  WO CONTRAST Date of Exam: 11/6/2024 4:02 PM EST Indication: eval for stress fracture medial proximal tibia.  Comparison: None available. Technique:  Routine multiplanar/multisequence images of the right knee were obtained without contrast administration. Findings: The anterior and posterior cruciate ligaments are intact. The medial collateral ligament is intact. The components of the posterior lateral corner of the knee are intact. The distal iliotibial band insertion is intact. The anterior extensor mechanism of the knee is intact. There is a complex tear involving the posterior horn and body segment of the medial meniscus. There may be a flipped or displaced fragment which extends into the medial gutter. Associated overlying soft tissue edema are seen at the medial aspect of the knee. There is also increased joint fluid in the medial gutter. There is a horizontal type meniscal tear involving the body segment of the lateral meniscus extending through the superior articular surface. Moderate  tricompartmental osteoarthritic degenerative changes are noted. There is evidence for articular cartilage loss, subchondral edema/cystic  change, and osteophytosis. More pronounced full-thickness articular cartilage loss is noted within the medial  compartment. There is focal crescentic abnormal signal along the osteochondral margin of the medial femoral condyle. The findings may be related to more focally pronounced osteoarthritis. Changes secondary to developing osteonecrosis or focal osteochondral injury could have this appearance. Marked surrounding edema is seen within the medial femoral condyle. There is also edema in the adjacent medial tibial plateau. A small joint effusion is observed. No abnormal bone marrow signal is otherwise observed. The cortical margins are grossly intact. A Baker's cyst is noted. There is edema in the adjacent soft tissues indicating partial rupture.     Impression: Impression: 1.Complex tear involving the posterior horn and body segment of the medial meniscus. There is evidence for displaced fragment which extends into the medial gutter. 2.Horizontal type meniscal tear of the body segment of the lateral meniscus. The tear extends through the superior articular surface. 3.Moderate  tricompartmental osteoarthritis is noted. Full-thickness articular cartilage loss is identified within the medial  compartment. 4.Crescentic abnormal signal along the osteochondral margin of the medial femoral condyle. The findings may relate to more focally pronounced osteoarthritis. Changes secondary to developing osteonecrosis could be considered. Changes secondary to an osteochondral injury could have this appearance. There is marked associated surrounding marrow edema within the medial femoral condyle and adjacent medial tibial plateau. Electronically Signed: William Haddad MD  11/6/2024 10:47 PM EST  Workstation ID: [id number]     REVIEW OF SYSTEMS  Review of Systems   Constitutional: Negative.    HENT: Negative.     Eyes: Negative.    Respiratory: Negative.     Cardiovascular: Negative.    Gastrointestinal: Negative.    Endocrine:  Negative.    Genitourinary: Negative.    Musculoskeletal: Negative.    Skin: Negative.    Allergic/Immunologic: Negative.    Neurological: Negative.    Hematological: Negative.    Psychiatric/Behavioral: Negative.           ACTIVE PROBLEMS  Patient Active Problem List    Diagnosis     Encounter for screening for malignant neoplasm of colon [Z12.11]     Primary osteoarthritis of right knee [M17.11]     Type 2 diabetes mellitus without complication, without long-term current use of insulin [E11.9]     Mixed hyperlipidemia [E78.2]     Primary hypertension [I10]     Gastroesophageal reflux disease with esophagitis without hemorrhage [K21.00]     Ann's esophagus without dysplasia [K22.70]     Acid reflux [K21.9]     Can't get food down [ZRB6912]     Gastrointestinal ulcer due to Helicobacter pylori [K28.9, B96.81]     Hx of colonic polyps [Z86.0100]          PAST MEDICAL HISTORY  Past Medical History:   Diagnosis Date    Allergic rhinitis     Arthritis of back     Arthritis of neck     Ann esophagus Feb 2023    Barretts esophagus     Benign prostatic hyperplasia without lower urinary tract symptoms     Bradycardia, unspecified     Cancer melanoma    Cellulitis and abscess of toe     Cervicalgia     Colon polyp     Deviated septum     Diabetes mellitus     Diabetic eye exam     5/14, 6/4/15 - negative, 6/16 - small change in vessel, 5/17 - negative, f/u 12 mo, 3/18 - negative, 4/19 - negative    Dysphagia     Enlarged prostate without lower urinary tract symptoms (luts)     Essential (primary) hypertension     Fracture, femur     Fracture, foot 1972    GERD (gastroesophageal reflux disease)     Gluteal tendinitis, unspecified hip     Goiter     Hearing loss     Hip arthrosis     Hyperlipidemia, unspecified     Knee swelling     Low back strain     Pain in left knee     Pain in right shoulder     Problem with sexual function     Pure hypercholesterolemia     Sprain     Sprain of hip: Sprain of other specified sites  of hip and thigh    Tinnitus, unspecified ear     Trochanteric bursitis, left hip     Type 2 diabetes mellitus without complication          SURGICAL HISTORY  Past Surgical History:   Procedure Laterality Date    COLONOSCOPY      COLONOSCOPY N/A 2017    Procedure: COLONOSCOPY;  Surgeon: Rosalia James MD;  Location:  [facility] OR;  Service:     ENDOSCOPY  2015    GERD/dysphagia - erosive gastritis, normal esophagus    ENDOSCOPY N/A 2023    Procedure: ESOPHAGOGASTRODUODENOSCOPY;  Surgeon: Lico Mcmillan MD;  [place] [facility] [place];  Service: Gastroenterology;  Laterality: N/A;  Esophagitis and Gastritis    FLEXIBLE SIGMOIDOSCOPY      FRACTURE SURGERY Left     FX: Femur    LIVER BIOPSY      SEPTOPLASTY      SINUS SURGERY      TONSILLECTOMY      UPPER GASTROINTESTINAL ENDOSCOPY           FAMILY HISTORY  Family History   Problem Relation Age of Onset    Hypertension Mother     Stroke Mother     Stroke Father     Heart failure Father     Benign prostatic hyperplasia Father     Colon cancer Neg Hx     Colon polyps Neg Hx          SOCIAL HISTORY  Social History     Occupational History    Occupation: retired   Tobacco Use    Smoking status: Former     Current packs/day: 0.00     Average packs/day: 1 pack/day for 10.0 years (10.0 ttl pk-yrs)     Types: Cigarettes, Pipe, Cigars     Start date: 1965     Quit date: 1975     Years since quittin.9     Passive exposure: Past    Smokeless tobacco: Never    Tobacco comments:     Caffeine: 2 cups coffee daily   Vaping Use    Vaping status: Never Used   Substance and Sexual Activity    Alcohol use: Yes     Alcohol/week: 1.0 standard drink of alcohol     Types: 1 Cans of beer per week     Comment: social minimal    Drug use: Never    Sexual activity: Yes         CURRENT MEDICATIONS    Current Outpatient Medications:     esomeprazole (nexIUM) 40 MG capsule, Take 1 capsule by mouth 2 (Two) Times a Day., Disp: 180 capsule, Rfl: 3     "finasteride (PROSCAR) 5 MG tablet, Take 1 tablet by mouth Daily., Disp: 90 tablet, Rfl: 1    Glucos-Chondroit-Hyaluron-MSM (Glucosamine Chondroitin Joint) tablet, Take  by mouth 2 (Two) Times a Day., Disp: , Rfl:     glucose blood test strip, Use as instructed, Disp: 360 each, Rfl: 12    Ibuprofen 3 %, Gabapentin 10 %, Baclofen 2 %, lidocaine 4 %, Ketamine HCl 4 %, Apply 1-2 g topically to the appropriate area as directed 3 (Three) to 4 (Four) times daily., Disp: 90 g, Rfl: 2    lisinopril (PRINIVIL,ZESTRIL) 5 MG tablet, TAKE 1 TABLET BY MOUTH EVERY DAY, Disp: 90 tablet, Rfl: 1    metFORMIN ER (GLUCOPHAGE-XR) 750 MG 24 hr tablet, TAKE 1 TABLET BY MOUTH EVERY DAY WITH BREAKFAST, Disp: 90 tablet, Rfl: 1    multivitamin with minerals (MULTIVITAMIN ADULTS PO), Take 1 tablet by mouth Daily., Disp: , Rfl:     simvastatin (ZOCOR) 20 MG tablet, Take 1 tablet by mouth Every Night., Disp: 90 tablet, Rfl: 1    terazosin (HYTRIN) 10 MG capsule, Take 1 capsule by mouth Every Night., Disp: 90 capsule, Rfl: 1    ALLERGIES  Patient has no known allergies.    VITALS  Vitals:    11/19/24 0850   BP: 112/76   BP Location: Left arm   Patient Position: Sitting   Cuff Size: Adult   Weight: 80 kg (176 lb 6.4 oz)   Height: 185.4 cm (72.99\")       PHYSICAL EXAM  Debilities/Disabilities Identified: None  Emotional Behavior: Appropriate  Wt Readings from Last 3 Encounters:   11/19/24 80 kg (176 lb 6.4 oz)   11/06/24 79.7 kg (175 lb 9.6 oz)   10/31/24 77.1 kg (170 lb)     Ht Readings from Last 1 Encounters:   11/19/24 185.4 cm (72.99\")     Body mass index is 23.28 kg/m².  Physical Exam  Constitutional:       General: He is not in acute distress.     Appearance: Normal appearance. He is not ill-appearing.   HENT:      Head: Normocephalic and atraumatic.      Mouth/Throat:      Mouth: Mucous membranes are moist.      Pharynx: No posterior oropharyngeal erythema.   Eyes:      General: No scleral icterus.  Cardiovascular:      Rate and Rhythm: " Normal rate and regular rhythm.      Heart sounds: Normal heart sounds.   Pulmonary:      Effort: Pulmonary effort is normal.      Breath sounds: Normal breath sounds.   Abdominal:      General: Abdomen is flat. Bowel sounds are normal. There is no distension.      Palpations: Abdomen is soft. There is no mass.      Tenderness: There is no abdominal tenderness. There is no guarding or rebound. Negative signs include Pozo's sign.      Hernia: No hernia is present.   Musculoskeletal:      Cervical back: Neck supple.   Skin:     General: Skin is warm.      Capillary Refill: Capillary refill takes less than 2 seconds.   Neurological:      General: No focal deficit present.      Mental Status: He is alert and oriented to person, place, and time.   Psychiatric:         Mood and Affect: Mood normal.         Behavior: Behavior normal.         Thought Content: Thought content normal.         Judgment: Judgment normal.         CLINICAL DATA REVIEWED   reviewed previous lab results and integrated with today's visit, reviewed notes from other physicians and/or last GI encounter, reviewed previous endoscopy results and available photos, reviewed surgical pathology results from previous biopsies    ASSESSMENT  Diagnoses and all orders for this visit:    Gastroesophageal reflux disease with esophagitis without hemorrhage    Ann's esophagus without dysplasia    Encounter for screening for malignant neoplasm of colon  -     Case Request; Standing  -     Case Request    Other orders  -     Follow Anesthesia Guidelines / Protocol; Future  -     Verify bowel prep was successful; Standing  -     Give tap water enema if bowel prep was insufficient; Standing          PLAN    Colonoscopy soon  Feels gas is worse on fiber, reviewed ok to hold and see  Continue BID PPI, stop pepcid, but if worsening belching  or dysphagia would add back in    Return in about 6 months (around 5/19/2025).    I have discussed the above plan with the  patient.  They verbalize understanding and are in agreement with the plan.  They have been advised to contact the office for any questions, concerns, or changes related to their health.

## 2024-11-21 DIAGNOSIS — Z79.4 TYPE 2 DIABETES MELLITUS WITH HYPOGLYCEMIA WITHOUT COMA, WITH LONG-TERM CURRENT USE OF INSULIN: ICD-10-CM

## 2024-11-21 DIAGNOSIS — E78.2 MIXED HYPERLIPIDEMIA: ICD-10-CM

## 2024-11-21 DIAGNOSIS — R35.0 BENIGN PROSTATIC HYPERPLASIA WITH URINARY FREQUENCY: ICD-10-CM

## 2024-11-21 DIAGNOSIS — N40.1 BENIGN PROSTATIC HYPERPLASIA WITH URINARY FREQUENCY: ICD-10-CM

## 2024-11-21 DIAGNOSIS — E11.649 TYPE 2 DIABETES MELLITUS WITH HYPOGLYCEMIA WITHOUT COMA, WITH LONG-TERM CURRENT USE OF INSULIN: ICD-10-CM

## 2024-11-22 RX ORDER — SIMVASTATIN 20 MG
20 TABLET ORAL NIGHTLY
Qty: 90 TABLET | Refills: 3 | Status: SHIPPED | OUTPATIENT
Start: 2024-11-22

## 2024-11-22 RX ORDER — TERAZOSIN 10 MG/1
10 CAPSULE ORAL NIGHTLY
Qty: 90 CAPSULE | Refills: 3 | Status: SHIPPED | OUTPATIENT
Start: 2024-11-22

## 2024-11-25 ENCOUNTER — TELEPHONE (OUTPATIENT)
Dept: INTERNAL MEDICINE | Facility: CLINIC | Age: 74
End: 2024-11-25

## 2024-11-25 NOTE — TELEPHONE ENCOUNTER
Caller: [facility]    Best call back number: [phone] (PATIENT)      Who are you requesting to speak with (clinical staff, provider,  specific staff member): CLINICAL       What was the call regarding:PATIENT NEEDS POST OP APPOINTMENT FOR  RIGHT TOTAL KNEE REPLACEMENT SURGERY SCHEDULED JANUARY 21, 2025    PLEASE CALL PATIENT TO SCHEDULE   [phone]

## 2024-11-27 ENCOUNTER — OFFICE VISIT (OUTPATIENT)
Age: 74
End: 2024-11-27
Payer: MEDICARE

## 2024-11-27 ENCOUNTER — PATIENT ROUNDING (BHMG ONLY) (OUTPATIENT)
Dept: ORTHOPEDIC SURGERY | Facility: CLINIC | Age: 74
End: 2024-11-27
Payer: MEDICARE

## 2024-11-27 VITALS — WEIGHT: 176 LBS | HEIGHT: 72 IN | RESPIRATION RATE: 20 BRPM | BODY MASS INDEX: 23.84 KG/M2

## 2024-11-27 DIAGNOSIS — M77.41 METATARSALGIA, RIGHT FOOT: ICD-10-CM

## 2024-11-27 DIAGNOSIS — M79.671 RIGHT FOOT PAIN: Primary | ICD-10-CM

## 2024-11-27 DIAGNOSIS — M25.561 CHRONIC PAIN OF RIGHT KNEE: ICD-10-CM

## 2024-11-27 DIAGNOSIS — M21.861 ACQUIRED POSTERIOR EQUINUS OF RIGHT LOWER EXTREMITY: ICD-10-CM

## 2024-11-27 DIAGNOSIS — M72.2 PLANTAR FASCIITIS OF RIGHT FOOT: ICD-10-CM

## 2024-11-27 DIAGNOSIS — G89.29 CHRONIC PAIN OF RIGHT KNEE: ICD-10-CM

## 2024-11-27 NOTE — PROGRESS NOTES
11/27/2024  Foot and Ankle Surgery - New Patient   Provider: Dr. Andre Shah DPM  Location: [facility]    Subjective:  Rupesh Fox is a 74 y.o. male.     Chief Complaint   Patient presents with    Right Foot - Pain, Initial Evaluation    Initial Evaluation     RAFI Roque md 11/6/2024       History of Present Illness  The patient is a 74-year-old male who presents for evaluation of right foot pain.    He has been experiencing intermittent discomfort in his right foot for several years, which intensified between July and September of 2024. The discomfort, which he rates as a 1 or 2 on the pain scale, is particularly noticeable when he flexes his toes and is located at the front and underneath the foot. He describes the sensation as more of a tightness than pain. He frequently wakes up at 4:00 AM with a mild cramp in his foot.    Despite being retired, he remains active, working on a farm five days a week, which involves walking on uneven terrain. His footwear includes Pina and QlibriS running shoes, [place] 6-inch boots, and Dr. Chi's inserts.    He also suffers from arthritis in both knees and is scheduled for a right knee replacement in January 2025. His most recent flare-up coincided with a stress fracture in his knee in June 2024, which resulted in swelling of the knee, leg, and foot.       No Known Allergies    Past Medical History:   Diagnosis Date    Allergic rhinitis     Arthritis of back     Arthritis of neck     Ann esophagus Feb 2023    Barretts esophagus     Benign prostatic hyperplasia without lower urinary tract symptoms     Bradycardia, unspecified     Cancer melanoma    Cellulitis and abscess of toe     Cervicalgia     Colon polyp     Deviated septum     Diabetes mellitus     Diabetic eye exam     5/14, 6/4/15 - negative, 6/16 - small change in vessel, 5/17 - negative, f/u 12 mo, 3/18 - negative, 4/19 - negative    Dysphagia     Enlarged prostate without lower urinary tract  symptoms (luts)     Essential (primary) hypertension     Fracture, femur     Fracture, foot     GERD (gastroesophageal reflux disease)     Gluteal tendinitis, unspecified hip     Goiter     Hearing loss     Hip arthrosis     Hyperlipidemia, unspecified     Knee swelling     Low back strain     Pain in left knee     Pain in right shoulder     Problem with sexual function     Pure hypercholesterolemia     Sprain     Sprain of hip: Sprain of other specified sites of hip and thigh    Tinnitus, unspecified ear     Trochanteric bursitis, left hip     Type 2 diabetes mellitus without complication        Past Surgical History:   Procedure Laterality Date    COLONOSCOPY      COLONOSCOPY N/A 2017    Procedure: COLONOSCOPY;  Surgeon: Rosalia James MD;  Location:  [facility] OR;  Service:     ENDOSCOPY  2015    GERD/dysphagia - erosive gastritis, normal esophagus    ENDOSCOPY N/A 2023    Procedure: ESOPHAGOGASTRODUODENOSCOPY;  Surgeon: Lico Mcmillan MD;  [place] [facility] [place] [facility];  Service: Gastroenterology;  Laterality: N/A;  Esophagitis and Gastritis    FLEXIBLE SIGMOIDOSCOPY      FRACTURE SURGERY Left     FX: Femur    LIVER BIOPSY      SEPTOPLASTY      SINUS SURGERY      TONSILLECTOMY      UPPER GASTROINTESTINAL ENDOSCOPY         Family History   Problem Relation Age of Onset    Hypertension Mother     Stroke Mother     Stroke Father     Heart failure Father     Benign prostatic hyperplasia Father     Colon cancer Neg Hx     Colon polyps Neg Hx        Social History     Socioeconomic History    Marital status:    Tobacco Use    Smoking status: Former     Current packs/day: 0.00     Average packs/day: 1 pack/day for 10.0 years (10.0 ttl pk-yrs)     Types: Cigarettes, Pipe, Cigars     Start date: 1965     Quit date: 1975     Years since quittin.9     Passive exposure: Past    Smokeless tobacco: Never    Tobacco comments:     Caffeine: 2 cups coffee daily   Vaping Use     "Vaping status: Never Used   Substance and Sexual Activity    Alcohol use: Yes     Alcohol/week: 1.0 standard drink of alcohol     Types: 1 Cans of beer per week     Comment: social minimal    Drug use: Never    Sexual activity: Yes        Current Outpatient Medications on File Prior to Visit   Medication Sig Dispense Refill    esomeprazole (nexIUM) 40 MG capsule Take 1 capsule by mouth 2 (Two) Times a Day. 180 capsule 3    finasteride (PROSCAR) 5 MG tablet Take 1 tablet by mouth Daily. 90 tablet 1    Glucos-Chondroit-Hyaluron-MSM (Glucosamine Chondroitin Joint) tablet Take  by mouth 2 (Two) Times a Day.      glucose blood test strip Use as instructed 360 each 12    Ibuprofen 3 %, Gabapentin 10 %, Baclofen 2 %, lidocaine 4 %, Ketamine HCl 4 % Apply 1-2 g topically to the appropriate area as directed 3 (Three) to 4 (Four) times daily. 90 g 2    lisinopril (PRINIVIL,ZESTRIL) 5 MG tablet TAKE 1 TABLET BY MOUTH EVERY DAY 90 tablet 1    metFORMIN ER (GLUCOPHAGE-XR) 750 MG 24 hr tablet TAKE 1 TABLET BY MOUTH EVERY DAY WITH BREAKFAST 90 tablet 1    multivitamin with minerals (MULTIVITAMIN ADULTS PO) Take 1 tablet by mouth Daily.      simvastatin (ZOCOR) 20 MG tablet TAKE 1 TABLET EVERY NIGHT 90 tablet 3    terazosin (HYTRIN) 10 MG capsule TAKE 1 CAPSULE EVERY NIGHT 90 capsule 3     No current facility-administered medications on file prior to visit.         Objective   Resp 20   Ht 182.9 cm (72\")   Wt 79.8 kg (176 lb)   BMI 23.87 kg/m²     Foot/Ankle Exam    GENERAL  Appearance:  appears stated age  Orientation:  AAOx3  Affect:  appropriate    VASCULAR     Right Foot Vascularity   Normal vascular exam    Dorsalis pedis:  2+  Posterior tibial:  2+  Skin temperature:  warm  Edema grading:  None  CFT:  < 3 seconds  Pedal hair growth:  Present  Varicosities:  none     NEUROLOGIC     Right Foot Neurologic   Light touch sensation: normal  Hot/Cold sensation: normal  Achilles reflex:  2+    MUSCULOSKELETAL     Right Foot " Musculoskeletal   Ecchymosis:  none  Tenderness:  dorsal foot, metatarsals tenderness, plantar fascia tenderness, toe 2 tenderness, toe 3 tenderness, toe 4 tenderness and toe 5 tenderness    Arch:  Normal  Hammertoe:  Second toe, third toe, fourth toe and fifth toe    MUSCLE STRENGTH     Right Foot Muscle Strength   Normal strength    Foot dorsiflexion:  5  Foot plantar flexion:  5  Foot inversion:  5  Foot eversion:  5    DERMATOLOGIC      Right Foot Dermatologic   Skin  Right foot skin is intact.     TESTS     Right Foot Tests   Calcaneal squeeze: negative  Talar tilt: negative     Right foot additional comments: Prominent soft tissue rigidity involving the forefoot.  Moderate equinus contracture with knee extended and flexed.  Discomfort along the medial plantar fascial band.  Semirigid hammer digit deformities.    Physical Exam         Results  Imaging  X-ray of the right foot shows a little bit of arthritis on the big toe joint and a little bit of a collapse in the middle of the foot when standing.       Assessment & Plan   Diagnoses and all orders for this visit:    1. Right foot pain (Primary)  -     XR Foot 3+ View Right    2. Metatarsalgia, right foot    3. Plantar fasciitis of right foot    4. Acquired posterior equinus of right lower extremity    5. Chronic pain of right knee       Assessment & Plan    The discomfort is likely due to a combination of factors including mild arthritis in the big toe joint, a slight collapse in the midfoot when standing, and minor hammertoe deformities. These conditions exert additional pressure on the soft tissues, leading to muscle imbalance and tightness. The pain is exacerbated by the foot's tendency to flatten out under body weight and activity, causing stress and strain on the soft tissues. The knee issues also contribute to the foot pain, as the foot compensates for the knee's dysfunction. It is anticipated that the foot pain will lessen once the knee replacement is  performed and the knee becomes functional. Over-the-counter arch supports, specifically Powerstep inserts, were recommended for use in both feet. He was advised to wear these inserts 90% of the day and avoid walking barefoot or in unsupportive footwear such as flip flops, sandals, or flats. Stretching exercises targeting the back of the leg were suggested, to be performed 3 to 5 times daily. He was also advised to use a tennis ball or massage ball to massage the bottom of the foot and to engage in low-impact exercises such as biking, using an elliptical machine, or swimming. Walking on uneven ground was discouraged.Reviewed proper basic stretching and manual therapy exercises along with appropriate shoes and activity.  Discussed proper use and/or avoidance of OTC anti-inflammatories.  Patient is to call with any additional issues or concerns.  Greater than 30 minutes was spent before, during, and after evaluation for patient care.    Follow-up  Return in 3 to 4 months for follow up.           Patient or patient representative verbalized consent for the use of Ambient Listening during the visit with  ROSALVA Shah DPM for chart documentation. 11/27/2024  12:50 EST    ROSALVA Shah DPM

## 2024-11-27 NOTE — PROGRESS NOTES
A My-Chart message has been sent to the patient for PATIENT ROUNDING with [facility]

## 2024-12-04 ENCOUNTER — OFFICE VISIT (OUTPATIENT)
Dept: INTERNAL MEDICINE | Facility: CLINIC | Age: 74
End: 2024-12-04
Payer: MEDICARE

## 2024-12-04 ENCOUNTER — HOSPITAL ENCOUNTER (OUTPATIENT)
Dept: GENERAL RADIOLOGY | Facility: HOSPITAL | Age: 74
Discharge: HOME | End: 2024-12-04
Admitting: INTERNAL MEDICINE
Payer: MEDICARE

## 2024-12-04 VITALS
TEMPERATURE: 97.6 F | DIASTOLIC BLOOD PRESSURE: 72 MMHG | BODY MASS INDEX: 24.11 KG/M2 | SYSTOLIC BLOOD PRESSURE: 122 MMHG | HEART RATE: 52 BPM | HEIGHT: 72 IN | RESPIRATION RATE: 16 BRPM | WEIGHT: 178 LBS | OXYGEN SATURATION: 98 %

## 2024-12-04 DIAGNOSIS — M54.50 RIGHT-SIDED LOW BACK PAIN WITHOUT SCIATICA, UNSPECIFIED CHRONICITY: ICD-10-CM

## 2024-12-04 DIAGNOSIS — M54.50 RIGHT-SIDED LOW BACK PAIN WITHOUT SCIATICA, UNSPECIFIED CHRONICITY: Primary | ICD-10-CM

## 2024-12-04 PROCEDURE — 72100 X-RAY EXAM L-S SPINE 2/3 VWS: CPT

## 2024-12-04 RX ORDER — CYCLOBENZAPRINE HCL 5 MG
5 TABLET ORAL 3 TIMES DAILY PRN
Qty: 30 TABLET | Refills: 2 | Status: SHIPPED | OUTPATIENT
Start: 2024-12-04

## 2024-12-04 RX ORDER — FAMOTIDINE 40 MG/1
TABLET, FILM COATED ORAL
COMMUNITY
Start: 2024-12-02

## 2024-12-04 NOTE — PROGRESS NOTES
"Chief Complaint  Back Pain (X 4 weeks )    Subjective        Rupesh Fox presents to [facility]  History of Present Illness  Had right sided lumbar back pain for about 4 weeks.  He remembers twisting and it started to hurt but he was a little concerned it has been lasting so long.  No radiation down into the right leg.  He is not really taking any medication except Tylenol.  Cannot take NSAIDs because of Ann's esophagus  Objective   Vital Signs:  /72 (BP Location: Left arm, Patient Position: Sitting, Cuff Size: Large Adult)   Pulse 52   Temp 97.6 °F (36.4 °C) (Temporal)   Resp 16   Ht 182.9 cm (72\")   Wt 80.7 kg (178 lb)   SpO2 98%   BMI 24.14 kg/m²   Estimated body mass index is 24.14 kg/m² as calculated from the following:    Height as of this encounter: 182.9 cm (72\").    Weight as of this encounter: 80.7 kg (178 lb).    BMI is within normal parameters. No other follow-up for BMI required.      Physical Exam  Vitals and nursing note reviewed.   Constitutional:       Appearance: Normal appearance.   HENT:      Head: Normocephalic and atraumatic.   Eyes:      Pupils: Pupils are equal, round, and reactive to light.   Cardiovascular:      Rate and Rhythm: Normal rate and regular rhythm.      Pulses: Normal pulses.   Pulmonary:      Effort: Pulmonary effort is normal.      Breath sounds: Normal breath sounds.   Abdominal:      Palpations: Abdomen is soft.   Musculoskeletal:      Cervical back: Normal range of motion.      Right lower leg: No edema.      Left lower leg: No edema.      Comments: Stiffness in the low back with flexion and extension.  Negative straight leg raises   Skin:     Capillary Refill: Capillary refill takes less than 2 seconds.   Neurological:      General: No focal deficit present.      Mental Status: He is alert.   Psychiatric:         Mood and Affect: Mood normal.         Behavior: Behavior normal.        Result Review " :      Data reviewed : Consultant notes orthopedic notes reviewed           Assessment and Plan   Diagnoses and all orders for this visit:    1. Right-sided low back pain without sciatica, unspecified chronicity (Primary)  -     XR Spine Lumbar 2 or 3 View; Future    Other orders  -     cyclobenzaprine (FLEXERIL) 5 MG tablet; Take 1 tablet by mouth 3 (Three) Times a Day As Needed for Muscle Spasms.  Dispense: 30 tablet; Refill: 2    Lumbar back pain on the right with preceding injury.  Worse in the mornings.  No radicular symptoms.  Worse in the mornings.  No radicular symptoms.  He has injured it in the past but it is taking a lot longer for it to resolve this time.  Cyclobenzaprine, start at night to see if that helps some of the morning stiffness.  He is a little reluctant to take prednisone because of previous difficulty with sleep when he took it.  He does have some Medrol from the orthopedist, suggested maybe taking 1 or 2 tablets a day for the next several days to see if that gives him some relief also.  X-ray lumbar spine and follow-up pending results.  He is doing some physical therapy on his own at home    l        No follow-ups on file.  Patient was given instructions and counseling regarding his condition or for health maintenance advice. Please see specific information pulled into the AVS if appropriate.

## 2025-01-07 ENCOUNTER — PRE-ADMISSION TESTING (OUTPATIENT)
Dept: PREADMISSION TESTING | Facility: HOSPITAL | Age: 75
End: 2025-01-07
Payer: MEDICARE

## 2025-01-07 VITALS
OXYGEN SATURATION: 97 % | SYSTOLIC BLOOD PRESSURE: 115 MMHG | DIASTOLIC BLOOD PRESSURE: 66 MMHG | HEART RATE: 48 BPM | RESPIRATION RATE: 16 BRPM | BODY MASS INDEX: 23.59 KG/M2 | WEIGHT: 178 LBS | HEIGHT: 73 IN

## 2025-01-07 DIAGNOSIS — M17.11 PRIMARY OSTEOARTHRITIS OF RIGHT KNEE: ICD-10-CM

## 2025-01-07 LAB
ABO GROUP BLD: NORMAL
ABO GROUP BLD: NORMAL
ANION GAP SERPL CALCULATED.3IONS-SCNC: 7 MMOL/L (ref 5–15)
APTT PPP: 28.3 SECONDS (ref 24.3–38.1)
BASOPHILS # BLD AUTO: 0.03 10*3/MM3 (ref 0–0.2)
BASOPHILS NFR BLD AUTO: 0.5 % (ref 0–1.5)
BILIRUB UR QL STRIP: NEGATIVE
BLD GP AB SCN SERPL QL: NEGATIVE
BUN SERPL-MCNC: 22 MG/DL (ref 8–23)
BUN/CREAT SERPL: 22.2 (ref 7–25)
CALCIUM SPEC-SCNC: 9.2 MG/DL (ref 8.6–10.5)
CHLORIDE SERPL-SCNC: 103 MMOL/L (ref 98–107)
CLARITY UR: CLEAR
CO2 SERPL-SCNC: 26 MMOL/L (ref 22–29)
COLOR UR: YELLOW
CREAT SERPL-MCNC: 0.99 MG/DL (ref 0.76–1.27)
DEPRECATED RDW RBC AUTO: 47.1 FL (ref 37–54)
EGFRCR SERPLBLD CKD-EPI 2021: 79.4 ML/MIN/1.73
EOSINOPHIL # BLD AUTO: 0.06 10*3/MM3 (ref 0–0.4)
EOSINOPHIL NFR BLD AUTO: 1 % (ref 0.3–6.2)
ERYTHROCYTE [DISTWIDTH] IN BLOOD BY AUTOMATED COUNT: 13 % (ref 12.3–15.4)
GLUCOSE SERPL-MCNC: 120 MG/DL (ref 65–99)
GLUCOSE UR STRIP-MCNC: NEGATIVE MG/DL
HCT VFR BLD AUTO: 41.3 % (ref 37.5–51)
HGB BLD-MCNC: 13.1 G/DL (ref 13–17.7)
HGB UR QL STRIP.AUTO: NEGATIVE
IMM GRANULOCYTES # BLD AUTO: 0 10*3/MM3 (ref 0–0.05)
IMM GRANULOCYTES NFR BLD AUTO: 0 % (ref 0–0.5)
INR PPP: 1.02 (ref 0.9–1.1)
KETONES UR QL STRIP: ABNORMAL
LEUKOCYTE ESTERASE UR QL STRIP.AUTO: NEGATIVE
LYMPHOCYTES # BLD AUTO: 2.32 10*3/MM3 (ref 0.7–3.1)
LYMPHOCYTES NFR BLD AUTO: 38.7 % (ref 19.6–45.3)
MCH RBC QN AUTO: 31.1 PG (ref 26.6–33)
MCHC RBC AUTO-ENTMCNC: 31.7 G/DL (ref 31.5–35.7)
MCV RBC AUTO: 98.1 FL (ref 79–97)
MONOCYTES # BLD AUTO: 0.51 10*3/MM3 (ref 0.1–0.9)
MONOCYTES NFR BLD AUTO: 8.5 % (ref 5–12)
NEUTROPHILS NFR BLD AUTO: 3.08 10*3/MM3 (ref 1.7–7)
NEUTROPHILS NFR BLD AUTO: 51.3 % (ref 42.7–76)
NITRITE UR QL STRIP: NEGATIVE
PH UR STRIP.AUTO: 5.5 [PH] (ref 4.5–8)
PLATELET # BLD AUTO: 169 10*3/MM3 (ref 140–450)
PMV BLD AUTO: 11.6 FL (ref 6–12)
POTASSIUM SERPL-SCNC: 4.3 MMOL/L (ref 3.5–5.2)
PROT UR QL STRIP: NEGATIVE
PROTHROMBIN TIME: 13.9 SECONDS (ref 12.1–15)
QT INTERVAL: 473 MS
QTC INTERVAL: 397 MS
RBC # BLD AUTO: 4.21 10*6/MM3 (ref 4.14–5.8)
RH BLD: POSITIVE
RH BLD: POSITIVE
SODIUM SERPL-SCNC: 136 MMOL/L (ref 136–145)
SP GR UR STRIP: 1.02 (ref 1–1.03)
T&S EXPIRATION DATE: NORMAL
UROBILINOGEN UR QL STRIP: ABNORMAL
WBC NRBC COR # BLD AUTO: 6 10*3/MM3 (ref 3.4–10.8)

## 2025-01-07 PROCEDURE — 85610 PROTHROMBIN TIME: CPT | Performed by: ORTHOPAEDIC SURGERY

## 2025-01-07 PROCEDURE — 86900 BLOOD TYPING SEROLOGIC ABO: CPT

## 2025-01-07 PROCEDURE — 93005 ELECTROCARDIOGRAM TRACING: CPT

## 2025-01-07 PROCEDURE — 86900 BLOOD TYPING SEROLOGIC ABO: CPT | Performed by: ORTHOPAEDIC SURGERY

## 2025-01-07 PROCEDURE — 86901 BLOOD TYPING SEROLOGIC RH(D): CPT

## 2025-01-07 PROCEDURE — 36415 COLL VENOUS BLD VENIPUNCTURE: CPT

## 2025-01-07 PROCEDURE — 80048 BASIC METABOLIC PNL TOTAL CA: CPT | Performed by: ORTHOPAEDIC SURGERY

## 2025-01-07 PROCEDURE — 87081 CULTURE SCREEN ONLY: CPT | Performed by: ORTHOPAEDIC SURGERY

## 2025-01-07 PROCEDURE — 85730 THROMBOPLASTIN TIME PARTIAL: CPT | Performed by: ORTHOPAEDIC SURGERY

## 2025-01-07 PROCEDURE — 85025 COMPLETE CBC W/AUTO DIFF WBC: CPT | Performed by: ORTHOPAEDIC SURGERY

## 2025-01-07 PROCEDURE — 86901 BLOOD TYPING SEROLOGIC RH(D): CPT | Performed by: ORTHOPAEDIC SURGERY

## 2025-01-07 PROCEDURE — 81003 URINALYSIS AUTO W/O SCOPE: CPT | Performed by: ORTHOPAEDIC SURGERY

## 2025-01-07 PROCEDURE — 86850 RBC ANTIBODY SCREEN: CPT | Performed by: ORTHOPAEDIC SURGERY

## 2025-01-07 NOTE — DISCHARGE INSTRUCTIONS
PRE-ADMISSION TESTING INSTRUCTIONS FOR TOTAL JOINT PATIENTS    Take these medications the morning of surgery with a small sip of water: nothing       No aspirin, advil, aleve, ibuprofen, naproxen, diet pills, decongestants, or vitamin/herbal supplements for a week prior to your surgery.     Tylenol/Acetaminophen ok to take if needed.    Do not take any insulin or diabetes medications the morning of surgery.          General Instructions:    DO NOT EAT SOLID FOOD AFTER MIDNIGHT THE NIGHT BEFORE SURGERY. No gum, mints, or hard candy after midnight the night before surgery.  You may drink clear liquids the day of surgery up until 2 hours before your arrival time.  Clear liquids are liquids you can see through. Nothing RED in color.    Plain water    Sports drinks      Gelatin (Jell-O)  Fruit juices without pulp such as white grape juice and apple juice  Popsicles that contain no fruit or yogurt  Tea or coffee (no cream or milk added)    It is beneficial for you to have a clear drink that contains carbohydrates 2 hours prior to your arrival time.  DRINK A BOTTLE OF SPORTS DRINK 2 HOURS BEFORE YOUR ARRIVAL TIME. IF YOU ARE DIABETIC, DRINK A LOW OR NO SUGAR SPORTS DRINK. ANY COLOR EXCEPT RED.    Patients who avoid smoking, chewing tobacco and alcohol for 4 weeks prior to surgery have a reduced risk of post-operative complications.  If at all possible, quit smoking as many days before surgery as you can.    Do not smoke, use chewing tobacco or drink alcohol after midnight the day of surgery.    Bring your C-PAP/ BI-PAP machine if you use one.  Wear clean comfortable clothes.  Do not wear contact lenses, lotion, deodorant, or make-up.  Bring a case for your glasses if applicable.   You may brush your teeth the morning of surgery.  You may wear dentures/partials, do not put adhesive/glue on them.  Leave all other jewelry and valuables at home.  NOTIFY YOUR SURGEON IF YOU BECOME ILL, HAVE A FEVER, PRODUCTIVE COUGH, OR CANNOT  BE HERE THE DAY OF SURGERY.      Preventing a Surgical Site Infection:    Shower the night before and on the morning of surgery using the chlorhexidine soap you were given.  Use a clean washcloth with the soap.  Place clean sheets on your bed after showering the night before surgery. Do not use the CHG soap on your hair, face, or private areas. Wash your body gently for five (5) minutes. Do not scrub your skin.  Dry with a clean towel and dress in clean clothing.    Do not shave the surgical area for 10 days-2 weeks prior to surgery  because the razor can irritate skin and make it easier to develop an infection.  Make sure you, your family, and all healthcare providers clean their hands with soap and water or an alcohol based hand  before caring for you or your wound.      Day of surgery:    Your surgeon’s office will advise you of your arrival time for the day of surgery.    Upon arrival, a Pre-op nurse and Anesthesia provider will review your health history, obtain vital signs, and answer questions you may have. The anesthesia provider will also discuss the type of anesthesia that will be needed for your procedure, which may include general anesthesia. The only belongings needed at this time will be your home medications and if applicable your C-PAP/BI-PAP machine.  If you are staying overnight your family can leave the rest of your belongings in the car and bring them to your room later.  A Pre-op nurse will start an IV and you may receive medication in preparation for surgery, including something to help you relax.  Your family will be able to see you in the Pre-op area.  While you are in surgery your family should notify the waiting room  if they leave the waiting room area and provide a contact phone number.    If you have any questions, you can call the Pre-Admission Department at [phone] or your surgeon's office.    Please be aware that surgery does come with discomfort.  We want  to make every effort to control your discomfort so please discuss any uncontrolled symptoms with your nurse.   Your doctor will most likely have prescribed pain medications.     You may have bruising or discomfort from the tourniquet used in surgery.     Please leave all luggage in the car the morning of surgery.  You will be transported to your hospital room following the recovery period.  Your family can get your luggage at that time.      You may receive a survey regarding the care you received. Your feedback is very important and will be used to collect the necessary data to help us to continue to provide excellent care.

## 2025-01-08 LAB — MRSA SPEC QL CULT: NORMAL

## 2025-01-14 DIAGNOSIS — R35.0 BENIGN PROSTATIC HYPERPLASIA WITH URINARY FREQUENCY: ICD-10-CM

## 2025-01-14 DIAGNOSIS — N40.1 BENIGN PROSTATIC HYPERPLASIA WITH URINARY FREQUENCY: ICD-10-CM

## 2025-01-15 DIAGNOSIS — I10 ESSENTIAL HYPERTENSION: ICD-10-CM

## 2025-01-15 DIAGNOSIS — E11.649 TYPE 2 DIABETES MELLITUS WITH HYPOGLYCEMIA WITHOUT COMA, WITH LONG-TERM CURRENT USE OF INSULIN: ICD-10-CM

## 2025-01-15 DIAGNOSIS — Z79.4 TYPE 2 DIABETES MELLITUS WITH HYPOGLYCEMIA WITHOUT COMA, WITH LONG-TERM CURRENT USE OF INSULIN: ICD-10-CM

## 2025-01-15 RX ORDER — LISINOPRIL 5 MG/1
5 TABLET ORAL DAILY
Qty: 90 TABLET | Refills: 3 | Status: SHIPPED | OUTPATIENT
Start: 2025-01-15

## 2025-01-15 RX ORDER — FINASTERIDE 5 MG/1
5 TABLET, FILM COATED ORAL DAILY
Qty: 90 TABLET | Refills: 3 | Status: SHIPPED | OUTPATIENT
Start: 2025-01-15

## 2025-01-15 RX ORDER — METFORMIN HYDROCHLORIDE 750 MG/1
750 TABLET, EXTENDED RELEASE ORAL
Qty: 90 TABLET | Refills: 3 | Status: SHIPPED | OUTPATIENT
Start: 2025-01-15

## 2025-01-16 NOTE — CASE MANAGEMENT/SOCIAL WORK
Continued Stay Note  JHOAN Nova     Patient Name: Rupesh Fox  MRN: [id number]  Today's Date: 1/16/2025    Admit Date: (Not on file)        Discharge Plan       Row Name 01/16/25 1456       Plan    Plan Comments Patient states he thinks he has a rolling walker that was  his mom that he will use and states he has elevated commode and declines the need for BSC. Patient states he will look for the walker and call me back if it does not have the wheels. He states he would like to do OP PT at [facility] and is agreeable for CM to arrange this service. He had no other questions. CM spoke with Griselda and she  has scheduled an appointment for 1/24/25 @ 9:30am. She states she will put him on the cancellation list for 1/23 if she gets an opening. CM will follow.                   Discharge Codes    No documentation.                       Celeste Stevenson RN

## 2025-01-17 ENCOUNTER — OFFICE VISIT (OUTPATIENT)
Dept: INTERNAL MEDICINE | Facility: CLINIC | Age: 75
End: 2025-01-17
Payer: MEDICARE

## 2025-01-17 VITALS
OXYGEN SATURATION: 98 % | HEIGHT: 73 IN | HEART RATE: 49 BPM | DIASTOLIC BLOOD PRESSURE: 76 MMHG | SYSTOLIC BLOOD PRESSURE: 120 MMHG | BODY MASS INDEX: 22.24 KG/M2 | RESPIRATION RATE: 16 BRPM | WEIGHT: 167.8 LBS

## 2025-01-17 DIAGNOSIS — Z01.818 PRE-OP EVALUATION: Primary | ICD-10-CM

## 2025-01-17 DIAGNOSIS — I10 ESSENTIAL HYPERTENSION: ICD-10-CM

## 2025-01-17 DIAGNOSIS — Z79.4 TYPE 2 DIABETES MELLITUS WITH HYPOGLYCEMIA WITHOUT COMA, WITH LONG-TERM CURRENT USE OF INSULIN: ICD-10-CM

## 2025-01-17 DIAGNOSIS — E11.649 TYPE 2 DIABETES MELLITUS WITH HYPOGLYCEMIA WITHOUT COMA, WITH LONG-TERM CURRENT USE OF INSULIN: ICD-10-CM

## 2025-01-17 DIAGNOSIS — E78.2 MIXED HYPERLIPIDEMIA: ICD-10-CM

## 2025-01-17 PROCEDURE — 3078F DIAST BP <80 MM HG: CPT | Performed by: INTERNAL MEDICINE

## 2025-01-17 PROCEDURE — 1126F AMNT PAIN NOTED NONE PRSNT: CPT | Performed by: INTERNAL MEDICINE

## 2025-01-17 PROCEDURE — 3074F SYST BP LT 130 MM HG: CPT | Performed by: INTERNAL MEDICINE

## 2025-01-17 PROCEDURE — 99214 OFFICE O/P EST MOD 30 MIN: CPT | Performed by: INTERNAL MEDICINE

## 2025-01-20 ENCOUNTER — OFFICE VISIT (OUTPATIENT)
Dept: ORTHOPEDIC SURGERY | Facility: CLINIC | Age: 75
End: 2025-01-20
Payer: MEDICARE

## 2025-01-20 ENCOUNTER — ANESTHESIA EVENT (OUTPATIENT)
Dept: PERIOP | Facility: HOSPITAL | Age: 75
End: 2025-01-20
Payer: MEDICARE

## 2025-01-20 VITALS — WEIGHT: 167 LBS | BODY MASS INDEX: 22.13 KG/M2 | HEIGHT: 73 IN

## 2025-01-20 DIAGNOSIS — M17.11 PRIMARY OSTEOARTHRITIS OF RIGHT KNEE: Primary | ICD-10-CM

## 2025-01-20 PROCEDURE — 99024 POSTOP FOLLOW-UP VISIT: CPT | Performed by: ORTHOPAEDIC SURGERY

## 2025-01-20 NOTE — PROGRESS NOTES
"Chief Complaint  Pre-op Exam (Pre-op clearance for RT Total Knee procedure on 1/21/25)    Subjective        Rupesh Fox presents to [facility]  History of Present Illness  Here for preop exam for R total knee arthroplasty  Labs and EKG have been completed  He denies chest pain, shortness of breath   EKG - sinus bradycardia, nonspecific intraventricular conduction delay  Labs unremarkable    Objective   Vital Signs:  /76   Pulse (!) 49   Resp 16   Ht 185.4 cm (73\")   Wt 76.1 kg (167 lb 12.8 oz)   SpO2 98%   BMI 22.14 kg/m²   Estimated body mass index is 22.14 kg/m² as calculated from the following:    Height as of this encounter: 185.4 cm (73\").    Weight as of this encounter: 76.1 kg (167 lb 12.8 oz).    BMI is within normal parameters. No other follow-up for BMI required.      Physical Exam  Vitals and nursing note reviewed.   Constitutional:       General: He is not in acute distress.     Appearance: Normal appearance. He is not toxic-appearing.   HENT:      Head: Normocephalic and atraumatic.      Right Ear: External ear normal.      Left Ear: External ear normal.      Nose: Nose normal.   Eyes:      General: No scleral icterus.        Right eye: No discharge.         Left eye: No discharge.      Extraocular Movements: Extraocular movements intact.      Conjunctiva/sclera: Conjunctivae normal.      Pupils: Pupils are equal, round, and reactive to light.   Cardiovascular:      Rate and Rhythm: Normal rate and regular rhythm.      Pulses: Normal pulses.      Heart sounds: Normal heart sounds. No murmur heard.  Pulmonary:      Effort: Pulmonary effort is normal.      Breath sounds: No wheezing or rales.   Musculoskeletal:         General: Normal range of motion.   Skin:     General: Skin is warm.      Capillary Refill: Capillary refill takes less than 2 seconds.   Neurological:      General: No focal deficit present.      Mental Status: He is alert and " oriented to person, place, and time. Mental status is at baseline.      Cranial Nerves: No cranial nerve deficit.      Gait: Gait normal.   Psychiatric:         Mood and Affect: Mood normal.         Behavior: Behavior normal.         Thought Content: Thought content normal.         Judgment: Judgment normal.        Result Review :  The following data was reviewed by: Leland Carson MD on 01/17/2025:  Common labs          6/5/2024    09:55 11/6/2024    10:30 1/7/2025    11:01   Common Labs   Glucose 91  95  120    BUN 26  25  22    Creatinine 1.19  1.11  0.99    Sodium 140  139  136    Potassium 4.8  5.1  4.3    Chloride 107  105  103    Calcium 9.5  9.6  9.2    Total Protein  6.6     Albumin  4.3     Total Bilirubin  0.6     Alkaline Phosphatase  75     AST (SGOT)  24     ALT (SGPT)  18     WBC  6.65  6.00    Hemoglobin  13.0  13.1    Hematocrit  38.8  41.3    Platelets  167  169    Total Cholesterol  137     Triglycerides  53     HDL Cholesterol  60     LDL Cholesterol   65     Hemoglobin A1C  6.30     Microalbumin, Urine  <3.0     PSA  0.566       Data reviewed : prior office notes reviewed           Assessment and Plan   Diagnoses and all orders for this visit:    1. Pre-op evaluation (Primary)  RCRI 0 pt - 3.9% risk of major cardiac event  Velasquez 0.2% risk of MI, cardiac arrest  Okay to proceed with surgery     2. Type 2 diabetes mellitus with hypoglycemia without coma, with long-term current use of insulin  At goal, continue metformin 750mg XR daily     3. Essential hypertension  Controlled continue lisinopril 5mg daily    4. Mixed hyperlipidemia  At goal, continue simvastatin 20mg nightly          I spent 15 minutes caring for Rupesh on this date of service. This time includes time spent by me in the following activities:preparing for the visit, reviewing tests, obtaining and/or reviewing a separately obtained history, performing a medically appropriate examination and/or evaluation , counseling and educating the  patient/family/caregiver, ordering medications, tests, or procedures, and documenting information in the medical record  Follow Up   F/u next scheduled appt   Patient was given instructions and counseling regarding his condition or for health maintenance advice. Please see specific information pulled into the AVS if appropriate.     Leland Carson MD  [facility]  [facility] [address]  Phone: [phone]

## 2025-01-20 NOTE — H&P
History & Physical       Patient: Rupesh Fox    YOB: 1950    Medical Record Number: [id number]    Surgeon:  Dr. Sergo Agustin    Chief Complaints:   Chief Complaint   Patient presents with    Right Knee - Follow-up, Pain       Subjective:  This problem is not new to this examiner.     History of Present Illness: 74 y.o. male presents with   Chief Complaint   Patient presents with    Right Knee - Follow-up, Pain   . Onset of symptoms was years ago and has been progressively worsening despite more conservative treatment measures.  Symptoms are associated with ability to move, exercise, and perform activities of daily living.  Symptoms are aggravated by weight bearing and ROM necessary for activities of daily living.   Symptoms improve with rest, ice and elevation only minimally.      Allergies: No Known Allergies    Medications:   Home Medications:  Current Outpatient Medications on File Prior to Visit   Medication Sig    cyclobenzaprine (FLEXERIL) 5 MG tablet Take 1 tablet by mouth 3 (Three) Times a Day As Needed for Muscle Spasms.    finasteride (PROSCAR) 5 MG tablet TAKE 1 TABLET EVERY DAY    glucose blood test strip Use as instructed    Ibuprofen 3 %, Gabapentin 10 %, Baclofen 2 %, lidocaine 4 %, Ketamine HCl 4 % Apply 1-2 g topically to the appropriate area as directed 3 (Three) to 4 (Four) times daily.    lisinopril (PRINIVIL,ZESTRIL) 5 MG tablet TAKE 1 TABLET EVERY DAY    metFORMIN ER (GLUCOPHAGE-XR) 750 MG 24 hr tablet TAKE 1 TABLET EVERY DAY WITH BREAKFAST    simvastatin (ZOCOR) 20 MG tablet TAKE 1 TABLET EVERY NIGHT    terazosin (HYTRIN) 10 MG capsule TAKE 1 CAPSULE EVERY NIGHT    Glucos-Chondroit-Hyaluron-MSM (Glucosamine Chondroitin Joint) tablet Take  by mouth 2 (Two) Times a Day. (Patient not taking: Reported on 1/20/2025)    multivitamin with minerals (MULTIVITAMIN ADULTS PO) Take 1 tablet by mouth Daily. (Patient not taking: Reported on 1/20/2025)     No current  facility-administered medications on file prior to visit.     Current Medications:  Scheduled Meds:  Continuous Infusions:No current facility-administered medications for this visit.    PRN Meds:.    I have reviewed the patient's medical history in detail and updated the computerized patient record.  Review and summarization of old records include:    Past Medical History:   Diagnosis Date    Allergic rhinitis     Arthritis of back     Arthritis of neck     Ann esophagus Feb 2023    Barretts esophagus     Benign prostatic hyperplasia without lower urinary tract symptoms     Bradycardia, unspecified     Cancer melanoma    Cellulitis and abscess of toe     Cervicalgia     Colon polyp     Deviated septum     Diabetes mellitus     Diabetic eye exam     5/14, 6/4/15 - negative, 6/16 - small change in vessel, 5/17 - negative, f/u 12 mo, 3/18 - negative, 4/19 - negative    Dysphagia     Enlarged prostate without lower urinary tract symptoms (luts)     Essential (primary) hypertension     Fracture, femur     Fracture, foot 1972    GERD (gastroesophageal reflux disease)     Gluteal tendinitis, unspecified hip     Hearing loss     Hip arthrosis     Hyperlipidemia, unspecified     Knee swelling     Low back strain     Pain in left knee     Pain in right shoulder     Problem with sexual function     Pure hypercholesterolemia     Sprain     Sprain of hip: Sprain of other specified sites of hip and thigh    Tinnitus, unspecified ear     Trochanteric bursitis, left hip     Type 2 diabetes mellitus without complication         Past Surgical History:   Procedure Laterality Date    COLONOSCOPY      COLONOSCOPY N/A 12/06/2017    Procedure: COLONOSCOPY;  Surgeon: Rosalia James MD;  Location: [facility] OR;  Service:     ENDOSCOPY  05/2015    GERD/dysphagia - erosive gastritis, normal esophagus    ENDOSCOPY N/A 02/07/2023    Procedure: ESOPHAGOGASTRODUODENOSCOPY;  Surgeon: Lico Mcmillan MD;  Location: [facility] OR;   Service: Gastroenterology;  Laterality: N/A;  Esophagitis and Gastritis    FLEXIBLE SIGMOIDOSCOPY      FRACTURE SURGERY Left     FX: Femur    LIVER BIOPSY      SEPTOPLASTY      SINUS SURGERY      TONSILLECTOMY      UPPER GASTROINTESTINAL ENDOSCOPY          Social History     Occupational History    Occupation: retired   Tobacco Use    Smoking status: Former     Current packs/day: 0.00     Average packs/day: 1 pack/day for 10.0 years (10.0 ttl pk-yrs)     Types: Cigarettes, Pipe, Cigars     Start date: 1965     Quit date: 1975     Years since quittin.0     Passive exposure: Past    Smokeless tobacco: Never    Tobacco comments:     Caffeine: 2 cups coffee daily   Vaping Use    Vaping status: Never Used   Substance and Sexual Activity    Alcohol use: Yes     Alcohol/week: 1.0 standard drink of alcohol     Types: 1 Cans of beer per week     Comment: social minimal    Drug use: Never    Sexual activity: Yes      Social History     Social History Narrative    Not on file        Family History   Problem Relation Age of Onset    Hypertension Mother     Stroke Mother     Stroke Father     Heart failure Father     Benign prostatic hyperplasia Father     Colon cancer Neg Hx     Colon polyps Neg Hx        ROS: 14 point review of systems was performed and was negative except for documented findings in HPI and today's encounter.     Allergies: No Known Allergies  Constitutional:  Denies fever, shaking or chills   Eyes:  Denies change in visual acuity   HENT:  Denies nasal congestion or sore throat   Respiratory:  Denies cough or shortness of breath   Cardiovascular:  Denies chest pain or severe LE edema   GI:  Denies abdominal pain, nausea, vomiting, bloody stools or diarrhea   Musculoskeletal:  Denies numbness tingling or loss of motor function except as outlined above in history of present illness.  : Denies painful urination or hematuria  Integument:  Denies rash, lesion or ulceration   Neurologic:   Denies headache or focal weakness  Endocrine:  Denies lymphadenopathy  Psych:  Denies confusion or change in mental status   Hem:  Denies active bleeding    Physical Exam: 74 y.o. male  Body mass index is 22.03 kg/m².  There were no vitals filed for this visit.  Vital signs reviewed.   General Appearance:    Alert, cooperative, in no acute distress                  Eyes: conjunctivae clear  ENT: external ears and nose atraumatic  CV: no peripheral edema  Resp: normal respiratory effort  Skin: no rashes or wounds; normal turgor  Psych: mood and affect appropriate  Lymph: no nodes appreciated  Neuro: gross sensation intact  Vascular:  Palpable peripheral pulse in noted extremity  Musculoskeletal Extremities:   Right knee pain along medial joint line and patellofemoral joint, varus alignment, moderate effusion    Debilities/Disabilities Identified: None      Diagnostic Tests:  Pre-Admission Testing on 01/07/2025   Component Date Value Ref Range Status    Color, UA 01/07/2025 Yellow  Yellow, Straw Final    Appearance, UA 01/07/2025 Clear  Clear Final    pH, UA 01/07/2025 5.5  4.5 - 8.0 Final    Specific Gravity, UA 01/07/2025 1.020  1.003 - 1.030 Final    Glucose, UA 01/07/2025 Negative  Negative Final    Ketones, UA 01/07/2025 Trace (A)  Negative Final    Bilirubin, UA 01/07/2025 Negative  Negative Final    Blood, UA 01/07/2025 Negative  Negative Final    Protein, UA 01/07/2025 Negative  Negative Final    Leuk Esterase, UA 01/07/2025 Negative  Negative Final    Nitrite, UA 01/07/2025 Negative  Negative Final    Urobilinogen, UA 01/07/2025 0.2 E.U./dL  0.2 - 1.0 E.U./dL Final    ABO Type 01/07/2025 O   Final    RH type 01/07/2025 Positive   Final    Antibody Screen 01/07/2025 Negative   Final    T&S Expiration Date 01/07/2025 1/21/2025 11:59:00 PM   Final    PTT 01/07/2025 28.3  24.3 - 38.1 seconds Final    Protime 01/07/2025 13.9  12.1 - 15.0 Seconds Final    INR 01/07/2025 1.02  0.90 - 1.10 Final    Glucose 01/07/2025  120 (H)  65 - 99 mg/dL Final    BUN 01/07/2025 22  8 - 23 mg/dL Final    Creatinine 01/07/2025 0.99  0.76 - 1.27 mg/dL Final    Sodium 01/07/2025 136  136 - 145 mmol/L Final    Potassium 01/07/2025 4.3  3.5 - 5.2 mmol/L Final    Chloride 01/07/2025 103  98 - 107 mmol/L Final    CO2 01/07/2025 26.0  22.0 - 29.0 mmol/L Final    Calcium 01/07/2025 9.2  8.6 - 10.5 mg/dL Final    BUN/Creatinine Ratio 01/07/2025 22.2  7.0 - 25.0 Final    Anion Gap 01/07/2025 7.0  5.0 - 15.0 mmol/L Final    eGFR 01/07/2025 79.4  >60.0 mL/min/1.73 Final    MRSA Screen Cx 01/07/2025 No Methicillin Resistant Staphylococcus aureus isolated   Final    QT Interval 01/07/2025 473  ms Final    QTC Interval 01/07/2025 397  ms Final    WBC 01/07/2025 6.00  3.40 - 10.80 10*3/mm3 Final    RBC 01/07/2025 4.21  4.14 - 5.80 10*6/mm3 Final    Hemoglobin 01/07/2025 13.1  13.0 - 17.7 g/dL Final    Hematocrit 01/07/2025 41.3  37.5 - 51.0 % Final    MCV 01/07/2025 98.1 (H)  79.0 - 97.0 fL Final    MCH 01/07/2025 31.1  26.6 - 33.0 pg Final    MCHC 01/07/2025 31.7  31.5 - 35.7 g/dL Final    RDW 01/07/2025 13.0  12.3 - 15.4 % Final    RDW-SD 01/07/2025 47.1  37.0 - 54.0 fl Final    MPV 01/07/2025 11.6  6.0 - 12.0 fL Final    Platelets 01/07/2025 169  140 - 450 10*3/mm3 Final    Neutrophil % 01/07/2025 51.3  42.7 - 76.0 % Final    Lymphocyte % 01/07/2025 38.7  19.6 - 45.3 % Final    Monocyte % 01/07/2025 8.5  5.0 - 12.0 % Final    Eosinophil % 01/07/2025 1.0  0.3 - 6.2 % Final    Basophil % 01/07/2025 0.5  0.0 - 1.5 % Final    Immature Grans % 01/07/2025 0.0  0.0 - 0.5 % Final    Neutrophils, Absolute 01/07/2025 3.08  1.70 - 7.00 10*3/mm3 Final    Lymphocytes, Absolute 01/07/2025 2.32  0.70 - 3.10 10*3/mm3 Final    Monocytes, Absolute 01/07/2025 0.51  0.10 - 0.90 10*3/mm3 Final    Eosinophils, Absolute 01/07/2025 0.06  0.00 - 0.40 10*3/mm3 Final    Basophils, Absolute 01/07/2025 0.03  0.00 - 0.20 10*3/mm3 Final    Immature Grans, Absolute 01/07/2025 0.00  0.00 -  0.05 10*3/mm3 Final    ABO Type 01/07/2025 O   Final    RH type 01/07/2025 Positive   Final     No results found.    Imaging was done today and discussed at length with the patient:    Indication: pain related symptoms,  Views: 3V AP, LAT & 40 degree PA right knee(s)   Findings: severe end-stage arthritis (bone on bone, subchondral sclerosis/cysts, osteophytes) most notably in the medial compartment with moderate joint space narrowing of the lateral patellofemoral joint on axial view  Comparison views: viewed last xray done in the office.     Assessment:  Right knee pain, DJD     Plan:  I reviewed anatomy of a total joint arthroplasty in laymen's terms, as well as typical postoperative recovery and possibly 6-12 months for maximal recovery, and possible need for rehabilitation stay after hospitalization. We also discussed risks, benefits, alternatives, and limitations of procedure with risks including but not limited to neurovascular damage, bleeding, infection, malalignment, chronic pain, failure of implants, osteolysis, loosening of implants, loss of motion, weakness, stiffness, instability, DVT, pulmonary embolus, death, stroke, complex regional pain syndrome, myocardial infarction, and need for additional procedures. No guarantees were given regarding results of surgery.      Rupesh Fox was given the opportunity to ask and have all questions answered today.  The patient voiced understanding of the risks, benefits, and alternative forms of treatment that were discussed and the patient consents to proceed with surgery.     Patient's blood clot history is negative.    Plan for DVT prophylaxis is aspirin    Patient's MRSA infection history is negative.    Patient's skin infection history is negative.    Discharge Plan: POD 0-1 to home    Date: 1/20/2025    Dictated utilizing Dragon dictation

## 2025-01-20 NOTE — H&P (VIEW-ONLY)
History & Physical       Patient: Rupesh Fox    YOB: 1950    Medical Record Number: [id number]    Surgeon:  Dr. Sergo Agustin    Chief Complaints:   Chief Complaint   Patient presents with    Right Knee - Follow-up, Pain       Subjective:  This problem is not new to this examiner.     History of Present Illness: 74 y.o. male presents with   Chief Complaint   Patient presents with    Right Knee - Follow-up, Pain   . Onset of symptoms was years ago and has been progressively worsening despite more conservative treatment measures.  Symptoms are associated with ability to move, exercise, and perform activities of daily living.  Symptoms are aggravated by weight bearing and ROM necessary for activities of daily living.   Symptoms improve with rest, ice and elevation only minimally.      Allergies: No Known Allergies    Medications:   Home Medications:  Current Outpatient Medications on File Prior to Visit   Medication Sig    cyclobenzaprine (FLEXERIL) 5 MG tablet Take 1 tablet by mouth 3 (Three) Times a Day As Needed for Muscle Spasms.    finasteride (PROSCAR) 5 MG tablet TAKE 1 TABLET EVERY DAY    glucose blood test strip Use as instructed    Ibuprofen 3 %, Gabapentin 10 %, Baclofen 2 %, lidocaine 4 %, Ketamine HCl 4 % Apply 1-2 g topically to the appropriate area as directed 3 (Three) to 4 (Four) times daily.    lisinopril (PRINIVIL,ZESTRIL) 5 MG tablet TAKE 1 TABLET EVERY DAY    metFORMIN ER (GLUCOPHAGE-XR) 750 MG 24 hr tablet TAKE 1 TABLET EVERY DAY WITH BREAKFAST    simvastatin (ZOCOR) 20 MG tablet TAKE 1 TABLET EVERY NIGHT    terazosin (HYTRIN) 10 MG capsule TAKE 1 CAPSULE EVERY NIGHT    Glucos-Chondroit-Hyaluron-MSM (Glucosamine Chondroitin Joint) tablet Take  by mouth 2 (Two) Times a Day. (Patient not taking: Reported on 1/20/2025)    multivitamin with minerals (MULTIVITAMIN ADULTS PO) Take 1 tablet by mouth Daily. (Patient not taking: Reported on 1/20/2025)     No current  facility-administered medications on file prior to visit.     Current Medications:  Scheduled Meds:  Continuous Infusions:No current facility-administered medications for this visit.    PRN Meds:.    I have reviewed the patient's medical history in detail and updated the computerized patient record.  Review and summarization of old records include:    Past Medical History:   Diagnosis Date    Allergic rhinitis     Arthritis of back     Arthritis of neck     Ann esophagus Feb 2023    Barretts esophagus     Benign prostatic hyperplasia without lower urinary tract symptoms     Bradycardia, unspecified     Cancer melanoma    Cellulitis and abscess of toe     Cervicalgia     Colon polyp     Deviated septum     Diabetes mellitus     Diabetic eye exam     5/14, 6/4/15 - negative, 6/16 - small change in vessel, 5/17 - negative, f/u 12 mo, 3/18 - negative, 4/19 - negative    Dysphagia     Enlarged prostate without lower urinary tract symptoms (luts)     Essential (primary) hypertension     Fracture, femur     Fracture, foot 1972    GERD (gastroesophageal reflux disease)     Gluteal tendinitis, unspecified hip     Hearing loss     Hip arthrosis     Hyperlipidemia, unspecified     Knee swelling     Low back strain     Pain in left knee     Pain in right shoulder     Problem with sexual function     Pure hypercholesterolemia     Sprain     Sprain of hip: Sprain of other specified sites of hip and thigh    Tinnitus, unspecified ear     Trochanteric bursitis, left hip     Type 2 diabetes mellitus without complication         Past Surgical History:   Procedure Laterality Date    COLONOSCOPY      COLONOSCOPY N/A 12/06/2017    Procedure: COLONOSCOPY;  Surgeon: Rosalia James MD;  Location: [facility] OR;  Service:     ENDOSCOPY  05/2015    GERD/dysphagia - erosive gastritis, normal esophagus    ENDOSCOPY N/A 02/07/2023    Procedure: ESOPHAGOGASTRODUODENOSCOPY;  Surgeon: Lico Mcmillan MD;  Location: [facility] OR;   Service: Gastroenterology;  Laterality: N/A;  Esophagitis and Gastritis    FLEXIBLE SIGMOIDOSCOPY      FRACTURE SURGERY Left     FX: Femur    LIVER BIOPSY      SEPTOPLASTY      SINUS SURGERY      TONSILLECTOMY      UPPER GASTROINTESTINAL ENDOSCOPY          Social History     Occupational History    Occupation: retired   Tobacco Use    Smoking status: Former     Current packs/day: 0.00     Average packs/day: 1 pack/day for 10.0 years (10.0 ttl pk-yrs)     Types: Cigarettes, Pipe, Cigars     Start date: 1965     Quit date: 1975     Years since quittin.0     Passive exposure: Past    Smokeless tobacco: Never    Tobacco comments:     Caffeine: 2 cups coffee daily   Vaping Use    Vaping status: Never Used   Substance and Sexual Activity    Alcohol use: Yes     Alcohol/week: 1.0 standard drink of alcohol     Types: 1 Cans of beer per week     Comment: social minimal    Drug use: Never    Sexual activity: Yes      Social History     Social History Narrative    Not on file        Family History   Problem Relation Age of Onset    Hypertension Mother     Stroke Mother     Stroke Father     Heart failure Father     Benign prostatic hyperplasia Father     Colon cancer Neg Hx     Colon polyps Neg Hx        ROS: 14 point review of systems was performed and was negative except for documented findings in HPI and today's encounter.     Allergies: No Known Allergies  Constitutional:  Denies fever, shaking or chills   Eyes:  Denies change in visual acuity   HENT:  Denies nasal congestion or sore throat   Respiratory:  Denies cough or shortness of breath   Cardiovascular:  Denies chest pain or severe LE edema   GI:  Denies abdominal pain, nausea, vomiting, bloody stools or diarrhea   Musculoskeletal:  Denies numbness tingling or loss of motor function except as outlined above in history of present illness.  : Denies painful urination or hematuria  Integument:  Denies rash, lesion or ulceration   Neurologic:   Denies headache or focal weakness  Endocrine:  Denies lymphadenopathy  Psych:  Denies confusion or change in mental status   Hem:  Denies active bleeding    Physical Exam: 74 y.o. male  Body mass index is 22.03 kg/m².  There were no vitals filed for this visit.  Vital signs reviewed.   General Appearance:    Alert, cooperative, in no acute distress                  Eyes: conjunctivae clear  ENT: external ears and nose atraumatic  CV: no peripheral edema  Resp: normal respiratory effort  Skin: no rashes or wounds; normal turgor  Psych: mood and affect appropriate  Lymph: no nodes appreciated  Neuro: gross sensation intact  Vascular:  Palpable peripheral pulse in noted extremity  Musculoskeletal Extremities:   Right knee pain along medial joint line and patellofemoral joint, varus alignment, moderate effusion    Debilities/Disabilities Identified: None      Diagnostic Tests:  Pre-Admission Testing on 01/07/2025   Component Date Value Ref Range Status    Color, UA 01/07/2025 Yellow  Yellow, Straw Final    Appearance, UA 01/07/2025 Clear  Clear Final    pH, UA 01/07/2025 5.5  4.5 - 8.0 Final    Specific Gravity, UA 01/07/2025 1.020  1.003 - 1.030 Final    Glucose, UA 01/07/2025 Negative  Negative Final    Ketones, UA 01/07/2025 Trace (A)  Negative Final    Bilirubin, UA 01/07/2025 Negative  Negative Final    Blood, UA 01/07/2025 Negative  Negative Final    Protein, UA 01/07/2025 Negative  Negative Final    Leuk Esterase, UA 01/07/2025 Negative  Negative Final    Nitrite, UA 01/07/2025 Negative  Negative Final    Urobilinogen, UA 01/07/2025 0.2 E.U./dL  0.2 - 1.0 E.U./dL Final    ABO Type 01/07/2025 O   Final    RH type 01/07/2025 Positive   Final    Antibody Screen 01/07/2025 Negative   Final    T&S Expiration Date 01/07/2025 1/21/2025 11:59:00 PM   Final    PTT 01/07/2025 28.3  24.3 - 38.1 seconds Final    Protime 01/07/2025 13.9  12.1 - 15.0 Seconds Final    INR 01/07/2025 1.02  0.90 - 1.10 Final    Glucose 01/07/2025  120 (H)  65 - 99 mg/dL Final    BUN 01/07/2025 22  8 - 23 mg/dL Final    Creatinine 01/07/2025 0.99  0.76 - 1.27 mg/dL Final    Sodium 01/07/2025 136  136 - 145 mmol/L Final    Potassium 01/07/2025 4.3  3.5 - 5.2 mmol/L Final    Chloride 01/07/2025 103  98 - 107 mmol/L Final    CO2 01/07/2025 26.0  22.0 - 29.0 mmol/L Final    Calcium 01/07/2025 9.2  8.6 - 10.5 mg/dL Final    BUN/Creatinine Ratio 01/07/2025 22.2  7.0 - 25.0 Final    Anion Gap 01/07/2025 7.0  5.0 - 15.0 mmol/L Final    eGFR 01/07/2025 79.4  >60.0 mL/min/1.73 Final    MRSA Screen Cx 01/07/2025 No Methicillin Resistant Staphylococcus aureus isolated   Final    QT Interval 01/07/2025 473  ms Final    QTC Interval 01/07/2025 397  ms Final    WBC 01/07/2025 6.00  3.40 - 10.80 10*3/mm3 Final    RBC 01/07/2025 4.21  4.14 - 5.80 10*6/mm3 Final    Hemoglobin 01/07/2025 13.1  13.0 - 17.7 g/dL Final    Hematocrit 01/07/2025 41.3  37.5 - 51.0 % Final    MCV 01/07/2025 98.1 (H)  79.0 - 97.0 fL Final    MCH 01/07/2025 31.1  26.6 - 33.0 pg Final    MCHC 01/07/2025 31.7  31.5 - 35.7 g/dL Final    RDW 01/07/2025 13.0  12.3 - 15.4 % Final    RDW-SD 01/07/2025 47.1  37.0 - 54.0 fl Final    MPV 01/07/2025 11.6  6.0 - 12.0 fL Final    Platelets 01/07/2025 169  140 - 450 10*3/mm3 Final    Neutrophil % 01/07/2025 51.3  42.7 - 76.0 % Final    Lymphocyte % 01/07/2025 38.7  19.6 - 45.3 % Final    Monocyte % 01/07/2025 8.5  5.0 - 12.0 % Final    Eosinophil % 01/07/2025 1.0  0.3 - 6.2 % Final    Basophil % 01/07/2025 0.5  0.0 - 1.5 % Final    Immature Grans % 01/07/2025 0.0  0.0 - 0.5 % Final    Neutrophils, Absolute 01/07/2025 3.08  1.70 - 7.00 10*3/mm3 Final    Lymphocytes, Absolute 01/07/2025 2.32  0.70 - 3.10 10*3/mm3 Final    Monocytes, Absolute 01/07/2025 0.51  0.10 - 0.90 10*3/mm3 Final    Eosinophils, Absolute 01/07/2025 0.06  0.00 - 0.40 10*3/mm3 Final    Basophils, Absolute 01/07/2025 0.03  0.00 - 0.20 10*3/mm3 Final    Immature Grans, Absolute 01/07/2025 0.00  0.00 -  0.05 10*3/mm3 Final    ABO Type 01/07/2025 O   Final    RH type 01/07/2025 Positive   Final     No results found.    Imaging was done today and discussed at length with the patient:    Indication: pain related symptoms,  Views: 3V AP, LAT & 40 degree PA right knee(s)   Findings: severe end-stage arthritis (bone on bone, subchondral sclerosis/cysts, osteophytes) most notably in the medial compartment with moderate joint space narrowing of the lateral patellofemoral joint on axial view  Comparison views: viewed last xray done in the office.     Assessment:  Right knee pain, DJD     Plan:  I reviewed anatomy of a total joint arthroplasty in laymen's terms, as well as typical postoperative recovery and possibly 6-12 months for maximal recovery, and possible need for rehabilitation stay after hospitalization. We also discussed risks, benefits, alternatives, and limitations of procedure with risks including but not limited to neurovascular damage, bleeding, infection, malalignment, chronic pain, failure of implants, osteolysis, loosening of implants, loss of motion, weakness, stiffness, instability, DVT, pulmonary embolus, death, stroke, complex regional pain syndrome, myocardial infarction, and need for additional procedures. No guarantees were given regarding results of surgery.      Rupesh Fox was given the opportunity to ask and have all questions answered today.  The patient voiced understanding of the risks, benefits, and alternative forms of treatment that were discussed and the patient consents to proceed with surgery.     Patient's blood clot history is negative.    Plan for DVT prophylaxis is aspirin    Patient's MRSA infection history is negative.    Patient's skin infection history is negative.    Discharge Plan: POD 0-1 to home    Date: 1/20/2025    Dictated utilizing Dragon dictation

## 2025-01-21 ENCOUNTER — HOSPITAL ENCOUNTER (OUTPATIENT)
Facility: HOSPITAL | Age: 75
Setting detail: HOSPITAL OUTPATIENT SURGERY
Discharge: HOME | End: 2025-01-21
Attending: ORTHOPAEDIC SURGERY | Admitting: ORTHOPAEDIC SURGERY
Payer: MEDICARE

## 2025-01-21 ENCOUNTER — ANESTHESIA (OUTPATIENT)
Dept: PERIOP | Facility: HOSPITAL | Age: 75
End: 2025-01-21
Payer: MEDICARE

## 2025-01-21 ENCOUNTER — APPOINTMENT (OUTPATIENT)
Dept: GENERAL RADIOLOGY | Facility: HOSPITAL | Age: 75
End: 2025-01-21
Payer: MEDICARE

## 2025-01-21 VITALS
SYSTOLIC BLOOD PRESSURE: 124 MMHG | HEART RATE: 53 BPM | RESPIRATION RATE: 15 BRPM | BODY MASS INDEX: 23.35 KG/M2 | DIASTOLIC BLOOD PRESSURE: 69 MMHG | OXYGEN SATURATION: 97 % | TEMPERATURE: 97.3 F | WEIGHT: 177 LBS

## 2025-01-21 DIAGNOSIS — Z96.651 S/P TOTAL KNEE ARTHROPLASTY, RIGHT: Primary | ICD-10-CM

## 2025-01-21 DIAGNOSIS — M17.11 PRIMARY OSTEOARTHRITIS OF RIGHT KNEE: ICD-10-CM

## 2025-01-21 LAB — GLUCOSE BLDC GLUCOMTR-MCNC: 125 MG/DL (ref 70–130)

## 2025-01-21 PROCEDURE — C1713 ANCHOR/SCREW BN/BN,TIS/BN: HCPCS | Performed by: ORTHOPAEDIC SURGERY

## 2025-01-21 PROCEDURE — 25010000002 BUPIVACAINE 0.5 % SOLUTION: Performed by: NURSE ANESTHETIST, CERTIFIED REGISTERED

## 2025-01-21 PROCEDURE — 25010000002 DEXAMETHASONE PER 1 MG: Performed by: NURSE ANESTHETIST, CERTIFIED REGISTERED

## 2025-01-21 PROCEDURE — 73560 X-RAY EXAM OF KNEE 1 OR 2: CPT

## 2025-01-21 PROCEDURE — 25810000003 LACTATED RINGERS PER 1000 ML: Performed by: NURSE ANESTHETIST, CERTIFIED REGISTERED

## 2025-01-21 PROCEDURE — 25010000002 BUPIVACAINE (PF) 0.5 % SOLUTION: Performed by: NURSE ANESTHETIST, CERTIFIED REGISTERED

## 2025-01-21 PROCEDURE — 82948 REAGENT STRIP/BLOOD GLUCOSE: CPT

## 2025-01-21 PROCEDURE — 25010000002 VANCOMYCIN HCL 1.25 G RECONSTITUTED SOLUTION 1 EACH VIAL: Performed by: ORTHOPAEDIC SURGERY

## 2025-01-21 PROCEDURE — C1776 JOINT DEVICE (IMPLANTABLE): HCPCS | Performed by: ORTHOPAEDIC SURGERY

## 2025-01-21 PROCEDURE — 25010000002 VANCOMYCIN 1 G RECONSTITUTED SOLUTION: Performed by: ORTHOPAEDIC SURGERY

## 2025-01-21 PROCEDURE — 25010000002 LIDOCAINE 2% SOLUTION: Performed by: NURSE ANESTHETIST, CERTIFIED REGISTERED

## 2025-01-21 PROCEDURE — 25010000002 PROPOFOL 200 MG/20ML EMULSION: Performed by: NURSE ANESTHETIST, CERTIFIED REGISTERED

## 2025-01-21 PROCEDURE — 25810000003 SODIUM CHLORIDE 0.9 % SOLUTION 250 ML FLEX CONT: Performed by: ORTHOPAEDIC SURGERY

## 2025-01-21 PROCEDURE — 25010000002 PROPOFOL 10 MG/ML EMULSION: Performed by: NURSE ANESTHETIST, CERTIFIED REGISTERED

## 2025-01-21 PROCEDURE — 25010000002 GLYCOPYRROLATE 0.2 MG/ML SOLUTION: Performed by: NURSE ANESTHETIST, CERTIFIED REGISTERED

## 2025-01-21 PROCEDURE — 25010000002 ONDANSETRON PER 1 MG: Performed by: NURSE ANESTHETIST, CERTIFIED REGISTERED

## 2025-01-21 PROCEDURE — 97161 PT EVAL LOW COMPLEX 20 MIN: CPT

## 2025-01-21 PROCEDURE — 25010000002 CEFAZOLIN PER 500 MG: Performed by: ORTHOPAEDIC SURGERY

## 2025-01-21 PROCEDURE — 25010000002 VANCOMYCIN 1 G RECONSTITUTED SOLUTION

## 2025-01-21 PROCEDURE — 25810000003 SODIUM CHLORIDE 0.9 % SOLUTION: Performed by: NURSE ANESTHETIST, CERTIFIED REGISTERED

## 2025-01-21 PROCEDURE — L1830 KO IMMOB CANVAS LONG PRE OTS: HCPCS | Performed by: ORTHOPAEDIC SURGERY

## 2025-01-21 PROCEDURE — 25010000002 MIDAZOLAM PER 1MG: Performed by: NURSE ANESTHETIST, CERTIFIED REGISTERED

## 2025-01-21 DEVICE — CAP TOTL KN CMT PRIMARY: Type: IMPLANTABLE DEVICE | Site: KNEE | Status: FUNCTIONAL

## 2025-01-21 DEVICE — IMPLANTABLE DEVICE
Type: IMPLANTABLE DEVICE | Site: KNEE | Status: FUNCTIONAL
Brand: PERSONA® NATURAL TIBIA®

## 2025-01-21 DEVICE — KNOTLESS TISSUE CONTROL DEVICE, UNDYED UNIDIRECTIONAL (ANTIBACTERIAL) SYNTHETIC ABSORBABLE DEVICE
Type: IMPLANTABLE DEVICE | Site: KNEE | Status: FUNCTIONAL
Brand: STRATAFIX

## 2025-01-21 DEVICE — VIOLET ANTIBACTERIAL POLYDIOXANONE, KNOTLESS TISSUE CONTROL DEVICE
Type: IMPLANTABLE DEVICE | Site: KNEE | Status: FUNCTIONAL
Brand: STRATAFIX

## 2025-01-21 DEVICE — IMPLANTABLE DEVICE
Type: IMPLANTABLE DEVICE | Site: KNEE | Status: FUNCTIONAL
Brand: REFOBACIN® BONE CEMENT R

## 2025-01-21 DEVICE — IMPLANTABLE DEVICE
Type: IMPLANTABLE DEVICE | Site: KNEE | Status: FUNCTIONAL
Brand: PERSONA™

## 2025-01-21 DEVICE — IMPLANTABLE DEVICE
Type: IMPLANTABLE DEVICE | Site: KNEE | Status: FUNCTIONAL
Brand: PERSONA®

## 2025-01-21 DEVICE — IMPLANTABLE DEVICE
Type: IMPLANTABLE DEVICE | Site: KNEE | Status: FUNCTIONAL
Brand: PERSONA® VIVACIT-E®

## 2025-01-21 RX ORDER — ASPIRIN 81 MG/1
81 TABLET ORAL 2 TIMES DAILY
Qty: 60 TABLET | Refills: 0 | Status: SHIPPED | OUTPATIENT
Start: 2025-01-21

## 2025-01-21 RX ORDER — ONDANSETRON 4 MG/1
4 TABLET, FILM COATED ORAL EVERY 8 HOURS PRN
Qty: 30 TABLET | Refills: 0 | Status: SHIPPED | OUTPATIENT
Start: 2025-01-21

## 2025-01-21 RX ORDER — FAMOTIDINE 10 MG/ML
20 INJECTION, SOLUTION INTRAVENOUS
Status: COMPLETED | OUTPATIENT
Start: 2025-01-21 | End: 2025-01-21

## 2025-01-21 RX ORDER — PREGABALIN 75 MG/1
150 CAPSULE ORAL ONCE
Status: COMPLETED | OUTPATIENT
Start: 2025-01-21 | End: 2025-01-21

## 2025-01-21 RX ORDER — ONDANSETRON 2 MG/ML
4 INJECTION INTRAMUSCULAR; INTRAVENOUS ONCE AS NEEDED
Status: COMPLETED | OUTPATIENT
Start: 2025-01-21 | End: 2025-01-21

## 2025-01-21 RX ORDER — TRANEXAMIC ACID 10 MG/ML
1000 INJECTION, SOLUTION INTRAVENOUS ONCE
Status: COMPLETED | OUTPATIENT
Start: 2025-01-21 | End: 2025-01-21

## 2025-01-21 RX ORDER — MAGNESIUM HYDROXIDE 1200 MG/15ML
LIQUID ORAL AS NEEDED
Status: DISCONTINUED | OUTPATIENT
Start: 2025-01-21 | End: 2025-01-21 | Stop reason: HOSPADM

## 2025-01-21 RX ORDER — VANCOMYCIN HYDROCHLORIDE 1 G/20ML
INJECTION, POWDER, LYOPHILIZED, FOR SOLUTION INTRAVENOUS AS NEEDED
Status: DISCONTINUED | OUTPATIENT
Start: 2025-01-21 | End: 2025-01-21 | Stop reason: SURG

## 2025-01-21 RX ORDER — SODIUM CHLORIDE 0.9 % (FLUSH) 0.9 %
10 SYRINGE (ML) INJECTION AS NEEDED
Status: DISCONTINUED | OUTPATIENT
Start: 2025-01-21 | End: 2025-01-21 | Stop reason: HOSPADM

## 2025-01-21 RX ORDER — BUPIVACAINE HYDROCHLORIDE 5 MG/ML
INJECTION, SOLUTION EPIDURAL; INTRACAUDAL
Status: COMPLETED | OUTPATIENT
Start: 2025-01-21 | End: 2025-01-21

## 2025-01-21 RX ORDER — DEXAMETHASONE SODIUM PHOSPHATE 4 MG/ML
8 INJECTION, SOLUTION INTRA-ARTICULAR; INTRALESIONAL; INTRAMUSCULAR; INTRAVENOUS; SOFT TISSUE ONCE AS NEEDED
Status: COMPLETED | OUTPATIENT
Start: 2025-01-21 | End: 2025-01-21

## 2025-01-21 RX ORDER — FAMOTIDINE 40 MG/1
40 TABLET, FILM COATED ORAL DAILY
Qty: 30 TABLET | Refills: 0 | Status: SHIPPED | OUTPATIENT
Start: 2025-01-21

## 2025-01-21 RX ORDER — FENTANYL CITRATE 50 UG/ML
25 INJECTION, SOLUTION INTRAMUSCULAR; INTRAVENOUS
Status: DISCONTINUED | OUTPATIENT
Start: 2025-01-21 | End: 2025-01-21 | Stop reason: HOSPADM

## 2025-01-21 RX ORDER — DEXAMETHASONE SODIUM PHOSPHATE 4 MG/ML
INJECTION, SOLUTION INTRA-ARTICULAR; INTRALESIONAL; INTRAMUSCULAR; INTRAVENOUS; SOFT TISSUE
Status: CANCELLED | OUTPATIENT
Start: 2025-01-21

## 2025-01-21 RX ORDER — LIDOCAINE HYDROCHLORIDE 20 MG/ML
INJECTION, SOLUTION INFILTRATION; PERINEURAL AS NEEDED
Status: DISCONTINUED | OUTPATIENT
Start: 2025-01-21 | End: 2025-01-21 | Stop reason: SURG

## 2025-01-21 RX ORDER — GLYCOPYRROLATE 0.2 MG/ML
INJECTION INTRAMUSCULAR; INTRAVENOUS AS NEEDED
Status: DISCONTINUED | OUTPATIENT
Start: 2025-01-21 | End: 2025-01-21 | Stop reason: SURG

## 2025-01-21 RX ORDER — MELOXICAM 15 MG/1
15 TABLET ORAL DAILY
Qty: 30 TABLET | Refills: 0 | Status: SHIPPED | OUTPATIENT
Start: 2025-01-21

## 2025-01-21 RX ORDER — SENNOSIDES A AND B 8.6 MG/1
1 TABLET, FILM COATED ORAL NIGHTLY
Qty: 30 TABLET | Refills: 0 | Status: SHIPPED | OUTPATIENT
Start: 2025-01-21

## 2025-01-21 RX ORDER — PROPOFOL 10 MG/ML
INJECTION, EMULSION INTRAVENOUS AS NEEDED
Status: DISCONTINUED | OUTPATIENT
Start: 2025-01-21 | End: 2025-01-21 | Stop reason: SURG

## 2025-01-21 RX ORDER — ACETAMINOPHEN 500 MG
1000 TABLET ORAL ONCE
Status: COMPLETED | OUTPATIENT
Start: 2025-01-21 | End: 2025-01-21

## 2025-01-21 RX ORDER — OXYCODONE AND ACETAMINOPHEN 5; 325 MG/1; MG/1
1-2 TABLET ORAL EVERY 4 HOURS PRN
Qty: 42 TABLET | Refills: 0 | Status: SHIPPED | OUTPATIENT
Start: 2025-01-21

## 2025-01-21 RX ORDER — ONDANSETRON 2 MG/ML
4 INJECTION INTRAMUSCULAR; INTRAVENOUS ONCE AS NEEDED
Status: DISCONTINUED | OUTPATIENT
Start: 2025-01-21 | End: 2025-01-21 | Stop reason: HOSPADM

## 2025-01-21 RX ORDER — DEXMEDETOMIDINE HYDROCHLORIDE 100 UG/ML
INJECTION, SOLUTION INTRAVENOUS
Status: COMPLETED | OUTPATIENT
Start: 2025-01-21 | End: 2025-01-21

## 2025-01-21 RX ORDER — VANCOMYCIN HYDROCHLORIDE 1 G/20ML
INJECTION, POWDER, LYOPHILIZED, FOR SOLUTION INTRAVENOUS AS NEEDED
Status: DISCONTINUED | OUTPATIENT
Start: 2025-01-21 | End: 2025-01-21 | Stop reason: HOSPADM

## 2025-01-21 RX ORDER — HYDROCODONE BITARTRATE AND ACETAMINOPHEN 5; 325 MG/1; MG/1
1 TABLET ORAL ONCE AS NEEDED
Status: DISCONTINUED | OUTPATIENT
Start: 2025-01-21 | End: 2025-01-21 | Stop reason: HOSPADM

## 2025-01-21 RX ORDER — DOCUSATE SODIUM 100 MG/1
100 CAPSULE, LIQUID FILLED ORAL 2 TIMES DAILY PRN
Qty: 30 CAPSULE | Refills: 0 | Status: SHIPPED | OUTPATIENT
Start: 2025-01-21

## 2025-01-21 RX ORDER — SODIUM CHLORIDE, SODIUM LACTATE, POTASSIUM CHLORIDE, CALCIUM CHLORIDE 600; 310; 30; 20 MG/100ML; MG/100ML; MG/100ML; MG/100ML
9 INJECTION, SOLUTION INTRAVENOUS CONTINUOUS PRN
Status: DISCONTINUED | OUTPATIENT
Start: 2025-01-21 | End: 2025-01-21 | Stop reason: HOSPADM

## 2025-01-21 RX ORDER — MIDAZOLAM HYDROCHLORIDE 2 MG/2ML
0.5 INJECTION, SOLUTION INTRAMUSCULAR; INTRAVENOUS
Status: DISCONTINUED | OUTPATIENT
Start: 2025-01-21 | End: 2025-01-21 | Stop reason: HOSPADM

## 2025-01-21 RX ORDER — BUPIVACAINE HYDROCHLORIDE 5 MG/ML
INJECTION, SOLUTION PERINEURAL
Status: COMPLETED | OUTPATIENT
Start: 2025-01-21 | End: 2025-01-21

## 2025-01-21 RX ORDER — SODIUM CHLORIDE 0.9 % (FLUSH) 0.9 %
10 SYRINGE (ML) INJECTION EVERY 12 HOURS SCHEDULED
Status: DISCONTINUED | OUTPATIENT
Start: 2025-01-21 | End: 2025-01-21 | Stop reason: HOSPADM

## 2025-01-21 RX ORDER — MELOXICAM 7.5 MG/1
15 TABLET ORAL ONCE
Status: COMPLETED | OUTPATIENT
Start: 2025-01-21 | End: 2025-01-21

## 2025-01-21 RX ORDER — SODIUM CHLORIDE, SODIUM LACTATE, POTASSIUM CHLORIDE, CALCIUM CHLORIDE 600; 310; 30; 20 MG/100ML; MG/100ML; MG/100ML; MG/100ML
100 INJECTION, SOLUTION INTRAVENOUS ONCE
Status: DISCONTINUED | OUTPATIENT
Start: 2025-01-21 | End: 2025-01-21 | Stop reason: HOSPADM

## 2025-01-21 RX ORDER — DOXYCYCLINE 100 MG/1
100 CAPSULE ORAL 2 TIMES DAILY
Qty: 20 CAPSULE | Refills: 0 | Status: SHIPPED | OUTPATIENT
Start: 2025-01-21 | End: 2025-01-31

## 2025-01-21 RX ORDER — HYDROCODONE BITARTRATE AND ACETAMINOPHEN 7.5; 325 MG/1; MG/1
2 TABLET ORAL ONCE AS NEEDED
Status: DISCONTINUED | OUTPATIENT
Start: 2025-01-21 | End: 2025-01-21 | Stop reason: HOSPADM

## 2025-01-21 RX ADMIN — TRANEXAMIC ACID 1000 MG: 10 INJECTION, SOLUTION INTRAVENOUS at 10:57

## 2025-01-21 RX ADMIN — VANCOMYCIN HYDROCHLORIDE 1250 MG: 1.25 INJECTION, POWDER, LYOPHILIZED, FOR SOLUTION INTRAVENOUS at 09:28

## 2025-01-21 RX ADMIN — MELOXICAM 15 MG: 7.5 TABLET ORAL at 09:28

## 2025-01-21 RX ADMIN — MIDAZOLAM HYDROCHLORIDE 0.5 MG: 1 INJECTION, SOLUTION INTRAMUSCULAR; INTRAVENOUS at 09:56

## 2025-01-21 RX ADMIN — SODIUM CHLORIDE, POTASSIUM CHLORIDE, SODIUM LACTATE AND CALCIUM CHLORIDE 9 ML/HR: 600; 310; 30; 20 INJECTION, SOLUTION INTRAVENOUS at 09:24

## 2025-01-21 RX ADMIN — ACETAMINOPHEN 1000 MG: 500 TABLET, FILM COATED ORAL at 09:28

## 2025-01-21 RX ADMIN — BUPIVACAINE HYDROCHLORIDE 10 ML: 5 INJECTION, SOLUTION EPIDURAL; INTRACAUDAL; PERINEURAL at 10:17

## 2025-01-21 RX ADMIN — DEXMEDETOMIDINE 20 MCG: 100 INJECTION, SOLUTION, CONCENTRATE INTRAVENOUS at 10:17

## 2025-01-21 RX ADMIN — ONDANSETRON 4 MG: 2 INJECTION INTRAMUSCULAR; INTRAVENOUS at 09:28

## 2025-01-21 RX ADMIN — FAMOTIDINE 20 MG: 10 INJECTION INTRAVENOUS at 09:28

## 2025-01-21 RX ADMIN — TRANEXAMIC ACID 1000 MG: 10 INJECTION, SOLUTION INTRAVENOUS at 12:19

## 2025-01-21 RX ADMIN — DEXAMETHASONE SODIUM PHOSPHATE 8 MG: 4 INJECTION INTRA-ARTICULAR; INTRALESIONAL; INTRAMUSCULAR; INTRAVENOUS; SOFT TISSUE at 09:32

## 2025-01-21 RX ADMIN — BUPIVACAINE HYDROCHLORIDE 1.8 ML: 5 INJECTION, SOLUTION PERINEURAL at 10:24

## 2025-01-21 RX ADMIN — VANCOMYCIN HYDROCHLORIDE 1.25 G: 1 INJECTION, POWDER, LYOPHILIZED, FOR SOLUTION INTRAVENOUS at 10:28

## 2025-01-21 RX ADMIN — SODIUM CHLORIDE 2000 MG: 9 INJECTION, SOLUTION INTRAVENOUS at 10:50

## 2025-01-21 RX ADMIN — PREGABALIN 150 MG: 75 CAPSULE ORAL at 09:28

## 2025-01-21 RX ADMIN — LIDOCAINE HYDROCHLORIDE 20 MG: 20 INJECTION, SOLUTION INFILTRATION; PERINEURAL at 10:48

## 2025-01-21 RX ADMIN — BUPIVACAINE HYDROCHLORIDE 8 ML/HR: 5 INJECTION, SOLUTION EPIDURAL; INTRACAUDAL; PERINEURAL at 13:16

## 2025-01-21 RX ADMIN — PROPOFOL INJECTABLE EMULSION 25 MCG/KG/MIN: 10 INJECTION, EMULSION INTRAVENOUS at 10:48

## 2025-01-21 RX ADMIN — DEXMEDETOMIDINE HYDROCHLORIDE 10 MCG: 100 INJECTION, SOLUTION INTRAVENOUS at 10:17

## 2025-01-21 RX ADMIN — PROPOFOL 30 MG: 10 INJECTION, EMULSION INTRAVENOUS at 10:48

## 2025-01-21 RX ADMIN — GLYCOPYRROLATE 0.1 MG: 0.2 INJECTION INTRAMUSCULAR; INTRAVENOUS at 10:45

## 2025-01-21 RX ADMIN — BUPIVACAINE HYDROCHLORIDE 20 ML: 5 INJECTION, SOLUTION EPIDURAL; INTRACAUDAL at 10:17

## 2025-01-21 NOTE — THERAPY DISCHARGE NOTE
Patient Name: Rupesh Fox  : 1950    MRN: [id number]                              Today's Date: 2025       Admit Date: 2025    Visit Dx:     ICD-10-CM ICD-9-CM   1. S/P total knee arthroplasty, right  Z96.651 V43.65   2. Primary osteoarthritis of right knee  M17.11 715.16     Patient Active Problem List   Diagnosis    Hx of colonic polyps    Acid reflux    Can't get food down    Gastrointestinal ulcer due to Helicobacter pylori    Gastroesophageal reflux disease with esophagitis without hemorrhage    Ann's esophagus without dysplasia    Type 2 diabetes mellitus without complication, without long-term current use of insulin    Mixed hyperlipidemia    Primary hypertension    Primary osteoarthritis of right knee    Encounter for screening for malignant neoplasm of colon     Past Medical History:   Diagnosis Date    Allergic rhinitis     Arthritis of back     Arthritis of neck     Ann esophagus 2023    Barretts esophagus     Benign prostatic hyperplasia without lower urinary tract symptoms     Bradycardia, unspecified     Cancer melanoma    Cellulitis and abscess of toe     Cervicalgia     Colon polyp     Deviated septum     Diabetes mellitus     Diabetic eye exam     , 6/4/15 - negative,  - small change in vessel,  - negative, f/u 12 mo, 3/18 - negative,  - negative    Dysphagia     Enlarged prostate without lower urinary tract symptoms (luts)     Essential (primary) hypertension     Fracture, femur     Fracture, foot     GERD (gastroesophageal reflux disease)     Gluteal tendinitis, unspecified hip     Hearing loss     Hip arthrosis     Hyperlipidemia, unspecified     Knee swelling     Low back strain     Pain in left knee     Pain in right shoulder     Problem with sexual function     Pure hypercholesterolemia     Sprain     Sprain of hip: Sprain of other specified sites of hip and thigh    Tinnitus, unspecified ear     Trochanteric bursitis, left hip     Type 2  diabetes mellitus without complication      Past Surgical History:   Procedure Laterality Date    COLONOSCOPY      COLONOSCOPY N/A 12/06/2017    Procedure: COLONOSCOPY;  Surgeon: Rosalia James MD;  Location: [facility] OR;  Service:     ENDOSCOPY  05/2015    GERD/dysphagia - erosive gastritis, normal esophagus    ENDOSCOPY N/A 02/07/2023    Procedure: ESOPHAGOGASTRODUODENOSCOPY;  Surgeon: Lico Mcmillan MD;  [place] [facility] [place];  Service: Gastroenterology;  Laterality: N/A;  Esophagitis and Gastritis    FLEXIBLE SIGMOIDOSCOPY      FRACTURE SURGERY Left 1972    FX: Femur    LIVER BIOPSY      SEPTOPLASTY      SINUS SURGERY      TONSILLECTOMY      UPPER GASTROINTESTINAL ENDOSCOPY  2015      General Information       Row Name 01/21/25 1440          Physical Therapy Time and Intention    Document Type discharge evaluation/summary  -BP     Mode of Treatment physical therapy  -BP       Row Name 01/21/25 1440          General Information    Patient Profile Reviewed yes  Patient s/p R TKA. WBAT.  -BP     Prior Level of Function --  Patient reports independence with mobility and ADLs without use of an AD.  -BP     Existing Precautions/Restrictions fall  -BP     Barriers to Rehab none identified  -BP       Row Name 01/21/25 1440          Living Environment    People in Home spouse  -BP       Row Name 01/21/25 1440          Home Main Entrance    Number of Stairs, Main Entrance two  -BP     Stair Railings, Main Entrance none  -BP       Row Name 01/21/25 1440          Cognition    Orientation Status (Cognition) oriented x 3  -BP       Row Name 01/21/25 1440          Safety Issues/Impairments Affecting Functional Mobility    Safety Issues Affecting Function (Mobility) positioning of assistive device  -BP               User Key  (r) = Recorded By, (t) = Taken By, (c) = Cosigned By      Initials Name Provider Type    BP Boaz Danielle, PT Physical Therapist                   Mobility       Row Name 01/21/25 1446           Bed Mobility    Bed Mobility supine-sit  -BP     Supine-Sit [place] (Bed Mobility) modified independence  -BP     Assistive Device (Bed Mobility) head of bed elevated  -BP       Row Name 01/21/25 1441          Transfers    Comment, (Transfers) verbal cues for hand placement  -       Row Name 01/21/25 1441          Sit-Stand Transfer    Sit-Stand [place] (Transfers) contact guard;verbal cues  -BP     Assistive Device (Sit-Stand Transfers) walker, front-wheeled  -BP       Row Name 01/21/25 1441          Gait/Stairs (Locomotion)    [place] Level (Gait) contact guard  -BP     Assistive Device (Gait) walker, front-wheeled  -BP     Patient was able to Ambulate yes  -BP     Distance in Feet (Gait) 80  -BP     Deviations/Abnormal Patterns (Gait) lindsey decreased;stride length decreased  -BP     Bilateral Gait Deviations forward flexed posture  -BP     Comment, (Gait/Stairs) Patient requires cues for improved safety with device. Patient able to correct. No loss of balance noted. Educated patient on navigation of stairs to enter home.  -BP               User Key  (r) = Recorded By, (t) = Taken By, (c) = Cosigned By      Initials Name Provider Type    BP Boaz Danielle, PT Physical Therapist                   Obj/Interventions       Row Name 01/21/25 1443          Range of Motion Comprehensive    Comment, General Range of Motion L LE AROM WFL. R ankle AROM WFL. Rknee not assessed  -       Row Name 01/21/25 1443          Strength Comprehensive (MMT)    Comment, General Manual Muscle Testing (MMT) Assessment L LE gross MMT 5/5. R LE strength not assessed, able to perform SLR with brace on  -       Row Name 01/21/25 1443          Balance    Comment, Balance Sitting balance-modified independence. Standing balance-CGA with device  -       Row Name 01/21/25 1443          Sensory Assessment (Somatosensory)    Sensory Assessment (Somatosensory) sensation intact  -BP               User Key  (r) =  Recorded By, (t) = Taken By, (c) = Cosigned By      Initials Name Provider Type    Boaz Collins, PT Physical Therapist                   Goals/Plan    No documentation.                  Clinical Impression       Row Name 01/21/25 1444          Pain    Pre/Posttreatment Pain Comment patient reports he is starting to have a little pain, does not rate  -BP       Row Name 01/21/25 1444          Plan of Care Review    Plan of Care Reviewed With patient  -BP     Outcome Evaluation PT Evaluation Complete: Patient performs supine to sit transfer with modified independence, sit to/from stand transfers with CGA, and gait x 80 feet with CGA with use of FWW. Patient requires cues for improved safety with device, able to correct and maintain. No loss of balance noted. Educated patient navigation of stairs to enter home. Patient has no concerns for return home and liudmila follow up with outpatient PT. No further inpatient skilled PT needs, anticipate return home today.  -BP       Row Name 01/21/25 1444          Therapy Assessment/Plan (PT)    Criteria for Skilled Interventions Met (PT) no problems identified which require skilled intervention  -BP     Therapy Frequency (PT) evaluation only  -BP       Row Name 01/21/25 1440          Positioning and Restraints    Pre-Treatment Position in bed  -BP     Post Treatment Position bed  -BP     In Bed sitting EOB;call light within reach;with family/caregiver  -BP               User Key  (r) = Recorded By, (t) = Taken By, (c) = Cosigned By      Initials Name Provider Type    Boaz Collins, PT Physical Therapist                   Outcome Measures       Row Name 01/21/25 1443          How much help from another person do you currently need...    Turning from your back to your side while in flat bed without using bedrails? 3  -BP     Moving from lying on back to sitting on the side of a flat bed without bedrails? 3  -BP     Moving to and from a bed to a chair (including a  wheelchair)? 3  -BP     Standing up from a chair using your arms (e.g., wheelchair, bedside chair)? 3  -BP     Climbing 3-5 steps with a railing? 3  -BP     To walk in hospital room? 3  -BP     AM-PAC 6 Clicks Score (PT) 18  -BP     Highest Level of Mobility Goal 6 --> Walk 10 steps or more  -BP       Row Name 01/21/25 1448          Functional Assessment    Outcome Measure Options AM-PAC 6 Clicks Basic Mobility (PT)  -BP               User Key  (r) = Recorded By, (t) = Taken By, (c) = Cosigned By      Initials Name Provider Type    BP Boaz Danielle, PT Physical Therapist                  Physical Therapy Education       Title: PT OT SLP Therapies (Done)       Topic: Physical Therapy (Done)       Point: Mobility training (Done)       Learning Progress Summary            Patient Acceptance, E,TB, VU by  at 1/21/2025 1448                      Point: Home exercise program (Done)       Learning Progress Summary            Patient Acceptance, E,TB, VU by  at 1/21/2025 1448                                      User Key       Initials Effective Dates Name Provider Type Discipline     06/16/21 -  Boaz Danielle, PT Physical Therapist PT                  PT Recommendation and Plan     Outcome Evaluation: PT Evaluation Complete: Patient performs supine to sit transfer with modified independence, sit to/from stand transfers with CGA, and gait x 80 feet with CGA with use of FWW. Patient requires cues for improved safety with device, able to correct and maintain. No loss of balance noted. Educated patient navigation of stairs to enter home. Patient has no concerns for return home and liudmila follow up with outpatient PT. No further inpatient skilled PT needs, anticipate return home today.     Time Calculation:   PT Evaluation Complexity  History, PT Evaluation Complexity: 1-2 personal factors and/or comorbidities  Examination of Body Systems (PT Eval Complexity): 1-2 elements  Clinical Presentation (PT Evaluation  Complexity): stable  Clinical Decision Making (PT Evaluation Complexity): low complexity  Overall Complexity (PT Evaluation Complexity): low complexity     PT Charges       Row Name 01/21/25 1451             Time Calculation    Start Time 1420  -BP      Stop Time 1435  -BP      Time Calculation (min) 15 min  -BP      PT Received On 01/21/25  -BP                User Key  (r) = Recorded By, (t) = Taken By, (c) = Cosigned By      Initials Name Provider Type    BP Boaz Danielle, PT Physical Therapist                  Therapy Charges for Today       Code Description Service Date Service Provider Modifiers Qty    [id number] HC PT EVAL LOW COMPLEXITY 1 1/21/2025 Boaz Danielle, PT GP 1            PT G-Codes  Outcome Measure Options: AM-PAC 6 Clicks Basic Mobility (PT)  AM-PAC 6 Clicks Score (PT): 18    PT Discharge Summary  Anticipated Discharge Disposition (PT): home with outpatient therapy services  Reason for Discharge: Discharge from facility  Discharge Destination: Home with outpatient services    Boaz Danielle, PT  1/21/2025

## 2025-01-21 NOTE — ANESTHESIA PROCEDURE NOTES
Spinal Block    Pre-sedation assessment completed: 1/21/2025 9:35 AM    Patient reassessed immediately prior to procedure    Start Time: 1/21/2025 10:23 AM  Stop Time: 1/21/2025 10:24 AM  Indication:at surgeon's request and procedure for pain  Performed By  CRNA/LAVON: Melanie Abreu CRNASRNA: Mey Joya SRNA  Preanesthetic Checklist  Completed: patient identified, IV checked, site marked, risks and benefits discussed, surgical consent, monitors and equipment checked, pre-op evaluation and timeout performed  Spinal Block Prep:  Patient Position:sitting  Sterile Tech:cap, gloves, mask and sterile barriers  Prep:Chloraprep  Patient Monitoring:blood pressure monitoring, continuous pulse oximetry and EKG    Spinal Block Procedure  Approach:midline  Guidance:landmark technique and palpation technique  Location:L3-L4  Needle Type:Sprotte  Needle Gauge:25 G  Placement of Spinal needle event:cerebrospinal fluid aspirated  Paresthesia: no  Fluid Appearance:clear  Medications: bupivacaine (MARCAINE) injection 0.5% - Injection   1.8 mL - 1/21/2025 10:24:00 AM   Post Assessment  Patient Tolerance:patient tolerated the procedure well with no apparent complications  Complications no

## 2025-01-21 NOTE — INTERVAL H&P NOTE
H&P reviewed. The patient was examined and there are no changes to the H&P.    There were no vitals filed for this visit.

## 2025-01-21 NOTE — OP NOTE
Date of Surgery: 1/21/2025    Preoperative diagnosis: right knee osteoarthritis    Postoperative diagnosis: right knee osteoarthritis    Procedure:  1.right total knee arthroplasty  2. Intraoperative use of kinetic knee balance sensor for implant stability during total knee arthroplasty    Surgical Approach: Knee Medial Parapatellar         Surgeon: Vamsi Delvalle.: Assistant: Ole Javed, VERITO was responsible for performing the following activities: Retraction, Irrigation, Closing, and Placing Dressing and their skilled assistance was necessary for the success of this case.    Anesthesia: MAC, spinal, regional    Estimated blood loss: 100 mL    Fluids: per anesthesia    Specimens: None    Complications: None    Drains: None    Tourniquet time: Tourniquet Times:     Inflated: 1/21/2025 11:12 AM     Deflated: 1/21/2025 12:19 PM    Implants used: Gertrude Persona total knee arthroplasty system with a size H tibia, size 12 cruciate retaining femoral component , 11 mm highly cross-linked medial congruent polyethylene insert, antibiotic cement    Examination under anesthesia: right knee passive range of motion 0-125°, varus alignment, no open wounds lacerations or abrasions over knee, stable to varus and valgus stress at 0 and 30°, 2+ dorsalis pedis pulse.    Indications for procedure: Patient is a pleasant 74 y.o. male who has had significant pain to right knee over the last several years, has failed conservative treatment with intra-articular injections, bracing, home exercise program, activity modification, anti-inflammatory medications. Has had persistent pain limiting activities of daily living and even has had pain at rest. We discussed treatment options and patient wished to proceed with above-mentioned surgery. Was explained details of procedure as well as risks benefits and alternatives as documented on history and physical and had all questions answered. No guarantees were given in regards to results of the  surgery.    Details of procedure: Patient was seen, evaluated, and cleared for surgery by anesthesia. Had been medically optimized by primary care physician. Patient was met in the preoperative hold area, operative site was marked, consent was reviewed, history and physical was updated, and preoperative labs were reviewed. Regional block and spinal were placed per anesthesia and patient was taken to the operating room and placed in supine position on a regular OR table. Examination under anesthesia was carried out this time. Nonsterile tourniquet was then applied to right lower extremity. Patient was secured to the table with a waist strap and all bony prominences were well-padded. right lower extremity was then sterilely prepped and draped in standard fashion.  Formal timeout was completed including confirmation of history and physical, operative consent, operative site, patient identification number, and preoperative antibiotics administration. right leg was then exsanguinated and tourniquet inflated to 250 mmHg. Procedure was then begun with longitudinal incision over the anterior aspect of the right knee through skin and subcutaneous tissue with 15 blade. Minimal subcutaneous flaps were developed at this point in time, and standard medial parapatellar arthrotomy was then created at this point. Portion of suprapatellar and infrapatellar fat pad were resected this point in time. Medial tibial peel was carried out in order to allow for further exposure the knee. Medial and lateral meniscus were resected this time and ACL was transected.    Patella was then everted and noted to have intact articular collagen thus we elected to not resurface the patella at this time.      Attention was then turned to the distal femur with the knee being brought into a flexed position.  Reference pin for I-Assist navigational system was placed in the distal femur in-line with Whitesides line in retrograde fashion.  Navigational distal  femoral cutting block was positioned at this time as well and calibrated with multiple planes of knee and hip motion carried out to set reference positions for navigation device.  Navigational device was then adjusted to 0 degrees valgus cut on distal femur and 3 degrees flexion cut on distal femur.  Distal femoral cutting block was pinned into position at this site, navigational device removed. Depth of the resection was checked with a sickle and noted to be appropriate. Additional pin was placed for security of the cutting block and oscillating saw was then used to create a distal femoral cut in standard fashion while collateral ligaments were protected with Z retractors. Bone was removed and measured at this time.  Cut validation was completed with navigational device at this point time as well confirming appropriate cut consistent with set parameters as noted above. Pins and cutting block were removed as well. Contents of the notch were then resected including the ACL, PCL, and posterior horns of medial and lateral meniscus. Posterior retractor was then placed and tibia was subluxated anteriorly.   Attention was then turned to the proximal tibia. Additional release of lateral tissues was completed at this time to allow for appropriate visualization of the proximal tibia articular surface. I-Assist navigational device was then pinned in position over the tibial tubercle with 3 pins at this time, external guide was placed in line with the tibial crest and center of the ankle joint and clamped into position over the ankle while proximal guide pins were impacted into the top of the tibia.  Navigational pods were then calibrated with range of motion of the hip and once noted to be appropriate, external guide from tibia was removed.  Adjustments were made to the navigational device to place the cut in neutral varus and 4 degrees posterior slope.  Tibial cutting block resection depth was set with a sickle, block was  pinned into position at this time, and alignment assessed with the drop mir noted to be in line with tibial crest, medial third of tibial tubercle, and center of the ankle joint.  Oscillating saw was then used to complete the proximal tibial cut while collateral ligaments were protected with Z retractors. Bone fragments were removed and measured.  Tibial cut was then validated with validation device from navigational system and confirmed to be within reasonable position of above-mentioned set parameters for the proximal tibia cut. Knee was brought into full extension with extension gap being checked with spacer block at this time and noted be acceptable with knee brought into full extension, stable medial and lateral collateral stability at full extension.  Navigational guide was then removed at this point time.   Attention was then returned to the distal femur with a femoral sizing guide being placed, transverse epicondylar axis and Whitesides line being assessed and used to determine appropriate external rotation of 3 degrees.  Femoral sizing guide was secured to the distal femur with 2 screws, sizing caliper was adjusted to the anterior femur and femur was sized at a 12.  2 drill holes were made through the sizing guide which was then removed including 2 screws. Size 12 femoral cutting block was then impacted onto the distal femoral cut surface and pinned into position. Sickle was used to check the anterior cut and there was no evidence of anticipated notching. Collateral ligaments were protected with Z retractors while anterior, posterior, and chamfer cuts were created in standard fashion with oscillating saw. Femoral cutting block was removed at this time as well as bone fragments. Posterior capsule was stripped at this time. Size 12 femoral trial was placed. Size H tibial baseplate trial with 11 mm polyethylene trial was inserted. Knee was then ranged from 0-135°, good varus and valgus stability at full  extension and 30° as well as throughout mid flexion with good AP translation at 90° of flexion noted.  Patella was noted to track in midline with no evidence of subluxation. Knee was ranged from full extension to full flexion and tibial rotation was noted with midline of tibial trial being marked with Bovie electrocautery at the proximal tibia. Femoral and tibial trials were removed at this point in time and tibia was subluxated anteriorly with posterior retractor. Tibial trial baseplate was placed once again, position confirmed with drop mir as well as with previous marking for tibial rotation and assessing for bony coverage of implant. Once position of tibial baseplate was noted to be appropriate, 2 pins were placed at this time, and reamer and punch were then used through the tibial baseplate.  All trial components were once again removed at this time and pulsatile lavage was used to thoroughly clean all bony cut surfaces as well as subcutaneous tissue. Final implants were opened on the back table this time. Cement was prepared on the back table and was applied to the cut surfaces of the distal femur, proximal tibia, as well as to the backside of the implants. Tibial component was placed first followed by distal femur. Extraneous cement was removed with freer at this time. Once all implants were in position knee was brought into full extension with axial compression to allow for cement to cure fully.  Once cement was completely hardened, trial polyethylene insert was assessed, range of motion of knee from 0-135° was achieved with good varus and valgus stability throughout range of motion and good AP translation at 90° of flexion. Thus a 11 mm polyethylene insert was opened on the back table, inserted and locked in appropriately into the tibial baseplate. Knee was thoroughly irrigated with a second evaluation for any additional bone fragments or cement particles. Knee was then thoroughly irrigated with Betadine  solution followed by pulsatile lavage. 1 g of vancomycin powder was added to deep and subcutaneous tissue at this time.  Attention was then turned to closure of the wound with running self locking #2 suture ×1, 2-0 Vicryls in inverted fashion for deep subcutaneous closure, 3-0 running self locking strata-fix suture, and glue and mesh for skin. Wound was dressed with Telfa, 4 x 4 gauze, web roll, ABD pad, Ace wrap, and patient was placed in knee immobilizer and given ice pack. At the end of procedure all lap, needle and sponge counts were correct ×2. Patient had brisk cap refill all digits right lower extremity, compartments are soft and easily compressible at the end of the procedure.    Disposition: Patient was taken to recovery room in stable condition. Will be discharged home pending appropriate pain control and initiation of therapy, weight-bear as tolerated right lower extremity, DVT prophylaxis will be started on postoperative day #1 with aspirin. Results of the procedure were discussed immediately postoperatively with patient's family and they had all questions answered at that time.

## 2025-01-21 NOTE — ANESTHESIA PREPROCEDURE EVALUATION
Anesthesia Evaluation     Patient summary reviewed and Nursing notes reviewed   no history of anesthetic complications:   NPO Solid Status: > 8 hours  NPO Liquid Status: > 2 hours           Airway   Mallampati: II  TM distance: >3 FB  Neck ROM: full  No difficulty expected  Dental      Pulmonary     breath sounds clear to auscultation  (+) a smoker Former,Sleep apnea: snores.  Cardiovascular   Exercise tolerance: good (4-7 METS)    ECG reviewed  Rhythm: regular  Rate: abnormal    (+) hypertension well controlled, dysrhythmias (rate runs in the 40's) Bradycardia, hyperlipidemia    ROS comment: Progress Note    HEART RATE=42  bpm  RR Kzijpstw=8958  ms  MO Wolihzdx=786  ms  P Horizontal Axis=15  deg  P Front Axis=40  deg  QRSD Phrxnami=704  ms  QT Ywwpakmj=960  ms  IGgV=558  ms  QRS Axis=-9  deg  T Wave Axis=44  deg  - ABNORMAL ECG -  Sinus bradycardia  Nonspecific  intraventricular conduction delay  NO PRIOR TRACING AVAILABLE FOR COMPARISON  Electronically Signed By: Laura Sauceda ([facility]) 2025-01-07 11:21:51  Date and Time of Study:2025-01-07 10:24:30      2023  Interpretation Summary       ·  Left ventricular systolic function is normal. Calculated left ventricular EF = 61.2%  ·  Left ventricular diastolic function was normal.  ·  The left atrial cavity is moderate to severely dilated.      Neuro/Psych- negative ROS  GI/Hepatic/Renal/Endo    (+) GERD well controlled, diabetes mellitus (A1C 6.3) type 2 well controlled    ROS Comment: Dysphagia  Ann's esophagus without dysplasia    Musculoskeletal   Back pain: occ.  Abdominal    Substance History   (+) alcohol use (social-occ)     OB/GYN          Other   arthritis,   history of cancer (skin)                  Anesthesia Plan    ASA 3     MAC, spinal and regional     (MAC anesthesia discussed with patient and/or patient representative. Risks (including but not limited to intra-op awareness), benefits, and alternatives were discussed. Understanding was voiced with an  agreement to proceed with a MAC technique and General as a backup option. )  intravenous induction     Anesthetic plan, risks, benefits, and alternatives have been provided, discussed and informed consent has been obtained with: patient.    Use of blood products discussed with patient  Consented to blood products.

## 2025-01-21 NOTE — ANESTHESIA POSTPROCEDURE EVALUATION
Patient: Rupesh Fox    Procedure Summary       Date: 01/21/25 Room / Location:  [facility] 3 /  [facility]    Anesthesia Start: 1041 Anesthesia Stop: 1259    Procedure: TOTAL KNEE ARTHROPLASTY AND ALL ASSOCIATED PROCEDURES (Right: Knee) Diagnosis:       Primary osteoarthritis of right knee      (Primary osteoarthritis of right knee [M17.11])    Surgeons: Sergo Agustin MD Provider: Melanie Abreu CRNA    Anesthesia Type: MAC, spinal, regional ASA Status: 3            Anesthesia Type: MAC, spinal, regional    Vitals  Vitals Value Taken Time   /82 01/21/25 1400   Temp 97.3 °F (36.3 °C) 01/21/25 1253   Pulse 104 01/21/25 1416   Resp 15 01/21/25 1400   SpO2 90 % 01/21/25 1416   Vitals shown include unfiled device data.        Post Anesthesia Care and Evaluation    Patient location during evaluation: PHASE II  Patient participation: complete - patient participated  Level of consciousness: awake and alert  Pain score: 0  Pain management: adequate    Airway patency: patent  Anesthetic complications: No anesthetic complications  PONV Status: none  Cardiovascular status: acceptable  Respiratory status: acceptable  Hydration status: acceptable

## 2025-01-21 NOTE — ANESTHESIA PROCEDURE NOTES
Peripheral Block    Pre-sedation assessment completed: 1/21/2025 9:35 AM    Patient reassessed immediately prior to procedure    Patient location during procedure: pre-op  Start time: 1/21/2025 10:15 AM  Stop time: 1/21/2025 10:17 AM  Reason for block: at surgeon's request, post-op pain management and secondary anesthetic  Performed by  CRNA/CAA: Melanie Abreu, CRNASRNA: Mey Joya SRNA  Preanesthetic Checklist  Completed: patient identified, IV checked, site marked, risks and benefits discussed, surgical consent, monitors and equipment checked, pre-op evaluation and timeout performed  Prep:  Pt Position: supine  Sterile barriers:cap, gloves, mask and washed/disinfected hands  Prep: ChloraPrep  Patient monitoring: blood pressure monitoring, continuous pulse oximetry and EKG  Procedure    Sedation: yes  Performed under: local infiltration  Guidance:ultrasound guided    ULTRASOUND INTERPRETATION.  Using ultrasound guidance a 21 G gauge needle was placed in close proximity to the nerve, at which point, under ultrasound guidance anesthetic was injected in the area of the nerve and spread of the anesthesia was seen on ultrasound in close proximity thereto.  There were no abnormalities seen on ultrasound; a digital image was taken; and the patient tolerated the procedure with no complications. Images:still images obtained, printed/placed on chart    Laterality:right  Block Type:iPack  Injection Technique:single-shot  Needle Type:echogenic  Needle Gauge:21 G  Resistance on Injection: none    Medications Used: dexmedetomidine HCl (PRECEDEX) injection - Perineural   10 mcg - 1/21/2025 10:17:00 AM  bupivacaine PF (MARCAINE) injection 0.5% - Perineural   20 mL - 1/21/2025 10:17:00 AM      Post Assessment  Injection Assessment: negative aspiration for heme, no paresthesia on injection and incremental injection  Patient Tolerance:comfortable throughout block  Complications:no  Performed by: Mey Joya,  SRNA

## 2025-01-21 NOTE — PLAN OF CARE
Goal Outcome Evaluation:  Plan of Care Reviewed With: patient           Outcome Evaluation: PT Evaluation Complete: Patient performs supine to sit transfer with modified independence, sit to/from stand transfers with CGA, and gait x 80 feet with CGA with use of FWW. Patient requires cues for improved safety with device, able to correct and maintain. No loss of balance noted. Educated patient navigation of stairs to enter home. Patient has no concerns for return home and liudmila follow up with outpatient PT. No further inpatient skilled PT needs, anticipate return home today.    Anticipated Discharge Disposition (PT): home with outpatient therapy services

## 2025-01-21 NOTE — ANESTHESIA PROCEDURE NOTES
Peripheral Block    Pre-sedation assessment completed: 1/21/2025 9:35 AM    Patient reassessed immediately prior to procedure    Patient location during procedure: pre-op  Start time: 1/21/2025 10:08 AM  Stop time: 1/21/2025 10:17 AM  Reason for block: at surgeon's request, post-op pain management and secondary anesthetic  Performed by  CRNA/CAA: Melanie Abreu, CRNASRNA: Mey Joya SRNA  Preanesthetic Checklist  Completed: patient identified, IV checked, site marked, risks and benefits discussed, surgical consent, monitors and equipment checked, pre-op evaluation and timeout performed  Prep:  Pt Position: supine  Sterile barriers:cap, gloves, mask and washed/disinfected hands  Prep: ChloraPrep  Patient monitoring: blood pressure monitoring, continuous pulse oximetry and EKG  Procedure    Sedation: yes  Performed under: local infiltration  Guidance:ultrasound guided    ULTRASOUND INTERPRETATION.  Using ultrasound guidance a 21 G gauge needle was placed in close proximity to the nerve, at which point, under ultrasound guidance anesthetic was injected in the area of the nerve and spread of the anesthesia was seen on ultrasound in close proximity thereto.  There were no abnormalities seen on ultrasound; a digital image was taken; and the patient tolerated the procedure with no complications. Images:still images obtained, printed/placed on chart    Laterality:right  Block Type:adductor canal block  Injection Technique:single-shot  Needle Type:echogenic  Needle Gauge:21 G  Resistance on Injection: none  Catheter Size:18 G    Medications Used: dexmedetomidine HCl (PRECEDEX) injection - Perineural   20 mcg - 1/21/2025 10:17:00 AM  bupivacaine PF (MARCAINE) injection 0.5% - Perineural   10 mL - 1/21/2025 10:17:00 AM      Post Assessment  Injection Assessment: negative aspiration for heme, no paresthesia on injection and incremental injection  Patient Tolerance:comfortable throughout  block  Complications:no  Performed by: Melanie Abreu, CRNA

## 2025-01-22 ENCOUNTER — TELEPHONE (OUTPATIENT)
Dept: GASTROENTEROLOGY | Facility: CLINIC | Age: 75
End: 2025-01-22
Payer: MEDICARE

## 2025-01-22 NOTE — TELEPHONE ENCOUNTER
"Provider: AGUILAR CUBA    Caller: Rupesh Fox \"Bakari\"    Relationship to Patient: Self    Phone Number: [phone]    Reason for Call: PATIENT CALLED IN AND STATED THAT HE ISN'T TOO SURE THAT HE HAS A COLONOSCOPY COMING UP IN MARCH, BUT IF SO, HE NEEDS TO RESCHEDULE FOR SOMETIME IN APRIL DUE TO JUST HAVING KNEE SURGERY. PLEASE CALL BACK TO ADVISE. OKAY TO CALL ANYTIME, OKAY TO LEAVE VM.      "

## 2025-01-24 ENCOUNTER — TREATMENT (OUTPATIENT)
Dept: PHYSICAL THERAPY | Facility: CLINIC | Age: 75
End: 2025-01-24
Payer: MEDICARE

## 2025-01-24 DIAGNOSIS — R26.9 GAIT DISTURBANCE: ICD-10-CM

## 2025-01-24 DIAGNOSIS — Z96.651 S/P TKR (TOTAL KNEE REPLACEMENT), RIGHT: ICD-10-CM

## 2025-01-24 DIAGNOSIS — M25.561 ACUTE PAIN OF RIGHT KNEE: ICD-10-CM

## 2025-01-24 DIAGNOSIS — M25.661 DECREASED RANGE OF MOTION (ROM) OF RIGHT KNEE: ICD-10-CM

## 2025-01-24 DIAGNOSIS — Z47.89 ORTHOPEDIC AFTERCARE: Primary | ICD-10-CM

## 2025-01-24 NOTE — PROGRESS NOTES
Physical Therapy Initial Evaluation and Plan of Care    [address] [id number]      Patient: Rupesh Fox   : 1950  Diagnosis/ICD-10 Code:  Orthopedic aftercare [Z47.89]  Referring practitioner: Sergo Agustin MD  Date of Initial Visit: 2025  Today's Date:  2025  Patient seen for 1 sessions           Subjective Questionnaire: LEFS:       Subjective Evaluation    History of Present Illness  Date of surgery: 2025  Mechanism of injury: Pt underwent R TKA on 25 with Dr. Agustin; went home same day. No complications; no hospital stay. Good motion prior to surgery.     Hobbies: small farm, outdoor work, gardening      Patient Occupation: retired Quality of life: excellent    Pain  Current pain ratin  At worst pain ratin (getting in car)  Location: R knee  Quality: tight, sharp, dull ache and discomfort  Relieving factors: ice, medications and rest  Aggravating factors: movement, overhead activity, sleeping, standing, stairs, repetitive movement, squatting and ambulation  Progression: worsening    Social Support  Lives in: multiple-level home (2 steps to enter thru garage; bedroom/bathroom on main floor)  Lives with: spouse    Hand dominance: right    Diagnostic Tests  X-ray: normal (satisfactory post op position R knee)    Treatments  Previous treatment: physical therapy  Patient Goals  Patient goals for therapy: decreased pain, improved balance, increased motion, independence with ADLs/IADLs, increased strength, return to sport/leisure activities and decreased edema           Past Medical History:   Diagnosis Date    Allergic rhinitis     Arthritis of back     Arthritis of neck     Ann esophagus 2023    Barretts esophagus     Benign prostatic hyperplasia without lower urinary tract symptoms     Bradycardia, unspecified     Cancer melanoma    Cellulitis and abscess of toe     Cervicalgia     Colon polyp     Deviated septum     Diabetes  mellitus     Diabetic eye exam     5/14, 6/4/15 - negative, 6/16 - small change in vessel, 5/17 - negative, f/u 12 mo, 3/18 - negative, 4/19 - negative    Dysphagia     Enlarged prostate without lower urinary tract symptoms (luts)     Essential (primary) hypertension     Fracture, femur     Fracture, foot 1972    GERD (gastroesophageal reflux disease)     Gluteal tendinitis, unspecified hip     Hearing loss     Hip arthrosis     Hyperlipidemia, unspecified     Knee swelling     Low back strain     Pain in left knee     Pain in right shoulder     Problem with sexual function     Pure hypercholesterolemia     Sprain     Sprain of hip: Sprain of other specified sites of hip and thigh    Tinnitus, unspecified ear     Trochanteric bursitis, left hip     Type 2 diabetes mellitus without complication       Past Surgical History:   Procedure Laterality Date    COLONOSCOPY      COLONOSCOPY N/A 12/06/2017    Procedure: COLONOSCOPY;  Surgeon: Rosalia James MD;  Location: [facility] OR;  Service:     ENDOSCOPY  05/2015    GERD/dysphagia - erosive gastritis, normal esophagus    ENDOSCOPY N/A 02/07/2023    Procedure: ESOPHAGOGASTRODUODENOSCOPY;  Surgeon: Lico Mcmillan MD;  Location: [facility] OR;  Service: Gastroenterology;  Laterality: N/A;  Esophagitis and Gastritis    FLEXIBLE SIGMOIDOSCOPY      FRACTURE SURGERY Left 1972    FX: Femur    LIVER BIOPSY      SEPTOPLASTY      SINUS SURGERY      TONSILLECTOMY      TOTAL KNEE ARTHROPLASTY Right 1/21/2025    Procedure: TOTAL KNEE ARTHROPLASTY AND ALL ASSOCIATED PROCEDURES;  Surgeon: Sergo Agustin MD;  Location: [facility] OR;  Service: Orthopedics;  Laterality: Right;    UPPER GASTROINTESTINAL ENDOSCOPY  2015          Objective          Observations     Right Knee   Positive for edema, effusion and incision.       Palpation     Right   Hypertonic in the distal biceps femoris, distal semimembranosus, distal semitendinosus, lateral gastrocnemius, medial gastrocnemius,  rectus femoris, vastus lateralis and vastus medialis. Tenderness of the rectus femoris and vastus medialis.     Tenderness     Right Knee   Tenderness in the inferior fat pad, ITB, lateral joint line, medial joint line, patellar tendon and quadriceps tendon.     Additional Tenderness Details  Along tibia R    Neurological Testing     Sensation     Knee   Left Knee   Intact: Light touch    Right Knee   Intact: light touch     Active Range of Motion     Right Knee   Flexion: 82 (seated; 70 degrees with supine heel slide) degrees with pain  Extension: 3 degrees     Additional Active Range of Motion Details  Unable to do SLR, LAQ or SAQ; able to initiate LAQ after a few assisted reps    Passive Range of Motion     Right Knee   Flexion: 90 degrees with pain    Patellar Mobility     Right Knee Hypomobile in the medial, lateral, superior and inferior patellar tendon(s).     Strength/Myotome Testing     Left Knee   Normal strength    Right Knee   Flexion: 2+  Extension: 2  Quadriceps contraction: poor    Tests     Additional Tests Details  Negative Thomas's sign RLE    Swelling     Left Knee Girth Measurement (cm)   Joint line: 37 cm  10 cm above joint line: 37 cm  10 cm below joint line: 33 cm    Right Knee Girth Measurement (cm)   Joint line: 43 cm  10 cm above joint line: 45 cm  10 cm below joint line: 37.5.    Ambulation   Weight-Bearing Status   Weight-Bearing Status (Left): weight-bearing as tolerated   Weight-Bearing Status (Right): weight-bearing as tolerated    Assistive device used: walker    Observational Gait   Gait: antalgic   Decreased right stance time, right swing time and right step length.     Quality of Movement During Gait     Knee    Knee (Right): Positive stiff knee.           Assessment & Plan       Assessment  Impairments: abnormal gait, abnormal muscle firing, abnormal or restricted ROM, activity intolerance, impaired balance, impaired physical strength, lacks appropriate home exercise program, pain  with function and weight-bearing intolerance   Functional limitations: carrying objects, sleeping, walking, uncomfortable because of pain, sitting, standing, stooping and unable to perform repetitive tasks   Assessment details: Rupesh Fox is a 74 y.o. year-old male referred to physical therapy for s/p R TKA on 1/21/25. He presents with a stable clinical presentation.  He has comorbidities (see subjective) and personal factors of an active lifestyle that may affect his progress in the plan of care.  Signs and symptoms are consistent with physical therapy diagnosis of impaired R knee ROM, strength and stability with impaired WB and gait with need for Rwx at this time. Patient is appropriate for skilled physical therapy in order to reduce pain and increase ease with daily mobility.  During evaluation, pt educated on anatomy, goal of interventions, positioning, ice, and body mechanics to promote healthy lifestyle and improve quality of life.   Prognosis: good    Goals  Plan Goals: STG: ~6 weeks  1. Pt will be demonstrate 0-110 degrees of knee extension and flexion to be able to sit in a chair properly  2. Pt will be able to complete car transfer with tolerable pain (2/10 or less)  3. Pt will be able to complete 12 steps reciprocally with single HR ascending and descending  4. Pt will demonstrate a SLR without extensor lag to improve terminal knee extension    LTG: ~12 weeks  1. Pt will be demonstrate 0-125 degrees of knee extension and flexion to be able to ascend/descend stairs  2. Pt will improve LEFS score to 55/80 to improve subjective function of LE with ADLs  3. Pt will be able to walk for 30 minutes without AD and pain 2/10 or less  4. 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 Pt will be I with HEP      Plan  Therapy options: will be seen for skilled therapy services  Planned modality interventions: cryotherapy, electrical stimulation/Czech  stimulation, thermotherapy (hydrocollator packs), ultrasound, dry needling and TENS  Planned therapy interventions: ADL retraining, balance/weight-bearing training, body mechanics training, flexibility, functional ROM exercises, gait training, home exercise program, joint mobilization, manual therapy, neuromuscular re-education, postural training, soft tissue mobilization, spinal/joint mobilization, strengthening, stretching and therapeutic activities  Treatment plan discussed with: patient and family (wife)  Plan details: 2 times per week 12 weeks        Visit Diagnoses:    ICD-10-CM ICD-9-CM   1. Orthopedic aftercare  Z47.89 V54.9   2. S/P TKR (total knee replacement), right  Z96.651 V43.65   3. Decreased range of motion (ROM) of right knee  M25.661 719.56   4. Gait disturbance  R26.9 781.2   5. Acute pain of right knee  M25.561 719.46       Timed:  Manual Therapy:    -     mins  18687;  Therapeutic Exercise:    15     mins  97866;     Neuromuscular Zaire:    -    mins  29997;    Therapeutic Activity:     10     mins  93247;     Gait Training:      -     mins  84713;     Ultrasound:     -     mins  97895;    Electrical Stimulation:    -     mins  36182 ( );    Low Eval                       20      Mins  81413  Mod Eval                        -     Mins  39282  High Eval                       -     Mins  80565    Untimed:  Electrical Stimulation:    15     mins  29364 ( );  Mechanical Traction:    -     mins  18794;     Timed Treatment:   25   mins   Total Treatment:     60   mins    Pt also purchased 2 ice packs at $20 each for total of $40    PT SIGNATURE: Haylie Pradhan PT   [place] license: [id number]  DATE TREATMENT INITIATED: 1/24/2025    Initial Certification  Certification Period: 4/24/2025  I certify that the therapy services are furnished while this patient is under my care.  The services outlined above are required by this patient, and will be reviewed every 90 days.     PHYSICIAN: Sergo Agustin  MD DAYLIN      DATE:     Please sign and return via fax to [phone]. Thank you, [facility].

## 2025-01-27 ENCOUNTER — TREATMENT (OUTPATIENT)
Dept: PHYSICAL THERAPY | Facility: CLINIC | Age: 75
End: 2025-01-27
Payer: MEDICARE

## 2025-01-27 DIAGNOSIS — Z47.89 ORTHOPEDIC AFTERCARE: Primary | ICD-10-CM

## 2025-01-27 DIAGNOSIS — M25.561 ACUTE PAIN OF RIGHT KNEE: ICD-10-CM

## 2025-01-27 DIAGNOSIS — Z96.651 S/P TKR (TOTAL KNEE REPLACEMENT), RIGHT: ICD-10-CM

## 2025-01-27 DIAGNOSIS — M25.661 DECREASED RANGE OF MOTION (ROM) OF RIGHT KNEE: ICD-10-CM

## 2025-01-27 DIAGNOSIS — R26.9 GAIT DISTURBANCE: ICD-10-CM

## 2025-01-27 PROCEDURE — G0283 ELEC STIM OTHER THAN WOUND: HCPCS | Performed by: PHYSICAL THERAPIST

## 2025-01-27 PROCEDURE — 97530 THERAPEUTIC ACTIVITIES: CPT | Performed by: PHYSICAL THERAPIST

## 2025-01-27 PROCEDURE — 97110 THERAPEUTIC EXERCISES: CPT | Performed by: PHYSICAL THERAPIST

## 2025-01-27 PROCEDURE — 97112 NEUROMUSCULAR REEDUCATION: CPT | Performed by: PHYSICAL THERAPIST

## 2025-01-27 NOTE — PROGRESS NOTES
Physical Therapy Daily Treatment Note  [facility]  [address], [place] [id number] [phone] (phone)  [phone] (fax)    Patient: Rupesh Fox   : 1950  Diagnosis/ICD-10 Code:  Orthopedic aftercare [Z47.89]  Referring practitioner: Sergo Agustin MD  Date of Initial Visit: Type: THERAPY  Noted: 2025  Today's Date: 2025  Patient seen for 2 sessions       Rupesh Fox reports: doing well, not taking any pain meds other than tylenol now. Not having much pain, doing exercises twice a day.     Subjective     Objective   R knee AAROM with strap flexion: 95 degrees  See Exercise, Manual, and Modality Logs for complete treatment.       Assessment/Plan  Subjectively, pt reports no increase of pain or discomfort with interventions performed today. Performed well with increased knee mobility and strengthening activities with improved R knee ROM to 95 degrees flexion. Continues to demonstrate increased ability to perform LAQ with increased quad activation. Difficulty with transitional movements due to R LE weakness, needed minimal assistance.  E-Stim and ice still provide pt relief. Continues to benefit from verbal/tactile cues to ensure proper form and technique for exercise performance.     Progress per Plan of Care           Manual Therapy:         mins  68916;  Therapeutic Exercise:    20     mins  54840;     Neuromuscular Zaire:    8    mins  49582;    Therapeutic Activity:     10     mins  54192;     Gait Training:           mins  80801;     Ultrasound:          mins  29193;    Electrical Stimulation:    15     mins  75303;  Traction          mins 70070    Timed Treatment:   38   mins   Total Treatment:     53   mins    Keena Rodas PTA  Physical Therapist Assistant [id number]

## 2025-01-31 ENCOUNTER — TREATMENT (OUTPATIENT)
Dept: PHYSICAL THERAPY | Facility: CLINIC | Age: 75
End: 2025-01-31
Payer: MEDICARE

## 2025-01-31 DIAGNOSIS — M25.561 ACUTE PAIN OF RIGHT KNEE: ICD-10-CM

## 2025-01-31 DIAGNOSIS — Z47.89 ORTHOPEDIC AFTERCARE: Primary | ICD-10-CM

## 2025-01-31 DIAGNOSIS — R26.9 GAIT DISTURBANCE: ICD-10-CM

## 2025-01-31 DIAGNOSIS — Z96.651 S/P TKR (TOTAL KNEE REPLACEMENT), RIGHT: ICD-10-CM

## 2025-01-31 DIAGNOSIS — M25.661 DECREASED RANGE OF MOTION (ROM) OF RIGHT KNEE: ICD-10-CM

## 2025-01-31 NOTE — PROGRESS NOTES
Physical Therapy Daily Treatment Note  [facility]  [address] [place] [address]  [phone] (phone)  [phone] (fax)    Patient: Rupesh Fox   : 1950  Diagnosis/ICD-10 Code:  Orthopedic aftercare [Z47.89]  Referring practitioner: Sergo Agustin MD  Date of Initial Visit: Type: THERAPY  Noted: 2025  Today's Date: 2025  Patient seen for 3 sessions       Rupesh Fox reports: a little sore after last session, stiff as always in the mornings.     Subjective     Objective   R knee AAROM with strap flexion: 100 degrees  See Exercise, Manual, and Modality Logs for complete treatment.       Assessment/Plan  Subjectively, pt reports no increase of pain or discomfort with interventions performed today. Performed well with continued knee mobility and strengthening interventions. Improved R knee AAROM flexion and nearing 0 degrees extension.  Continues to benefit from verbal/tactile cues to ensure proper form and technique for exercise performance. Discussed gaining access to a recumbent bike/stepper to do daily for increasing R knee mobility, whether is it borrowing a bike or joining a gym.     Progress per Plan of Care           Manual Therapy:         mins  90705;  Therapeutic Exercise:    20     mins  39708;     Neuromuscular Zaire:    8    mins  44341;    Therapeutic Activity:     10     mins  73738;     Gait Training:           mins  18092;     Ultrasound:          mins  25267;    Electrical Stimulation:    15     mins  83498;  Traction          mins 86351    Timed Treatment:   38   mins   Total Treatment:     53   mins    Keena Rodas PTA  Physical Therapist Assistant [phone]

## 2025-02-03 ENCOUNTER — TREATMENT (OUTPATIENT)
Dept: PHYSICAL THERAPY | Facility: CLINIC | Age: 75
End: 2025-02-03
Payer: MEDICARE

## 2025-02-03 DIAGNOSIS — Z47.89 ORTHOPEDIC AFTERCARE: Primary | ICD-10-CM

## 2025-02-03 DIAGNOSIS — R26.9 GAIT DISTURBANCE: ICD-10-CM

## 2025-02-03 DIAGNOSIS — M25.661 DECREASED RANGE OF MOTION (ROM) OF RIGHT KNEE: ICD-10-CM

## 2025-02-03 DIAGNOSIS — Z96.651 S/P TKR (TOTAL KNEE REPLACEMENT), RIGHT: ICD-10-CM

## 2025-02-03 DIAGNOSIS — M25.561 ACUTE PAIN OF RIGHT KNEE: ICD-10-CM

## 2025-02-03 PROCEDURE — 97110 THERAPEUTIC EXERCISES: CPT | Performed by: PHYSICAL THERAPIST

## 2025-02-03 PROCEDURE — 97112 NEUROMUSCULAR REEDUCATION: CPT | Performed by: PHYSICAL THERAPIST

## 2025-02-03 PROCEDURE — G0283 ELEC STIM OTHER THAN WOUND: HCPCS | Performed by: PHYSICAL THERAPIST

## 2025-02-03 PROCEDURE — 97530 THERAPEUTIC ACTIVITIES: CPT | Performed by: PHYSICAL THERAPIST

## 2025-02-03 NOTE — PROGRESS NOTES
Physical Therapy Daily Treatment Note  [facility]  [address] [place] [address]  [phone] (phone)  [phone] (fax)    Patient: Rupesh Fox   : 1950  Diagnosis/ICD-10 Code:  Orthopedic aftercare [Z47.89]  Referring practitioner: Sergo Agustin MD  Date of Initial Visit: Type: THERAPY  Noted: 2025  Today's Date: 2/3/2025  Patient seen for 4 sessions       Rupesh Fox reports: doing well, I think I am going to get a recumbent bike. R leg seems like it is getting stronger.     Subjective     Objective   R knee AAROM: 0-103 degrees  See Exercise, Manual, and Modality Logs for complete treatment.       Assessment/Plan  Subjectively, pt reports no increase of pain or discomfort with interventions performed today. Performed well with continued knee mobility and strengthening activities. Continues to demonstrate improving R knee ROM. Continues to benefit from verbal/tactile cues to ensure proper form and technique for exercise performance.     Progress per Plan of Care           Manual Therapy:         mins  20072;  Therapeutic Exercise:    15     mins  26916;     Neuromuscular Zaire:    8    mins  90308;    Therapeutic Activity:     15     mins  46734;     Gait Training:           mins  56383;     Ultrasound:          mins  49499;    Electrical Stimulation:    15     mins  65917;  Traction          mins 39326    Timed Treatment:   38   mins   Total Treatment:     53   mins    Keena Rodas PTA  Physical Therapist Assistant [id number]

## 2025-02-05 ENCOUNTER — OFFICE VISIT (OUTPATIENT)
Dept: ORTHOPEDIC SURGERY | Facility: CLINIC | Age: 75
End: 2025-02-05
Payer: MEDICARE

## 2025-02-05 VITALS — WEIGHT: 177 LBS | HEIGHT: 73 IN | BODY MASS INDEX: 23.46 KG/M2

## 2025-02-05 DIAGNOSIS — Z96.651 S/P TKR (TOTAL KNEE REPLACEMENT), RIGHT: Primary | ICD-10-CM

## 2025-02-05 RX ORDER — METHYLPREDNISOLONE 4 MG/1
TABLET ORAL
Qty: 1 EACH | Refills: 0 | Status: SHIPPED | OUTPATIENT
Start: 2025-02-05

## 2025-02-05 RX ORDER — HYDROCODONE BITARTRATE AND ACETAMINOPHEN 5; 325 MG/1; MG/1
1 TABLET ORAL EVERY 4 HOURS PRN
Qty: 60 TABLET | Refills: 0 | Status: SHIPPED | OUTPATIENT
Start: 2025-02-05

## 2025-02-05 RX ORDER — CYCLOBENZAPRINE HCL 5 MG
5 TABLET ORAL 3 TIMES DAILY PRN
Qty: 45 TABLET | Refills: 0 | Status: SHIPPED | OUTPATIENT
Start: 2025-02-05

## 2025-02-05 NOTE — PROGRESS NOTES
CC: s/p right total knee arthroplasty, DOS 1/21/2025  Interval History: Rupesh Fox returns for 2 week postoperative visit.  He is doing well. Pain is controlled with pain medication and is improving. He denies any wound problem, fevers, or chills. Patient is continuing to work with outpatient PT. Ambulating with cane. Continuing DVT prophylaxis with use of ASA.     Physical Examination: Right knee was examined   Incision clean and dry   ROM 2-95,  4/5 strength   Stable to varus and valgus stress   Flex/extend ankle and toes   Positive sensation right foot   No calf pain, negative Homans sign bilaterally    Assessment/Plan:  Rupesh Fox is recovering from surgery as expected.  We will continue outpatient therapy for range of motion, strengthening, and gait normalization.    He is to follow up in clinic in 4 weeks with xrays. Patient had all question answered today. Will continue DVT prophylaxis for an additional 1-2 weeks. Discussed with patient to avoid immersing incision for 4 weeks total after surgery.     Medications:  New Medications Ordered This Visit   Medications    HYDROcodone-acetaminophen (NORCO) 5-325 MG per tablet     Sig: Take 1 tablet by mouth Every 4 (Four) Hours As Needed for Moderate Pain or Severe Pain.     Dispense:  60 tablet     Refill:  0    cyclobenzaprine (FLEXERIL) 5 MG tablet     Sig: Take 1 tablet by mouth 3 (Three) Times a Day As Needed for Muscle Spasms.     Dispense:  45 tablet     Refill:  0    methylPREDNISolone (MEDROL) 4 MG dose pack     Sig: Take as directed on package instructions.     Dispense:  1 each     Refill:  0       Sergo Agustin MD

## 2025-02-07 ENCOUNTER — TREATMENT (OUTPATIENT)
Dept: PHYSICAL THERAPY | Facility: CLINIC | Age: 75
End: 2025-02-07
Payer: MEDICARE

## 2025-02-07 DIAGNOSIS — M25.561 ACUTE PAIN OF RIGHT KNEE: ICD-10-CM

## 2025-02-07 DIAGNOSIS — Z47.89 ORTHOPEDIC AFTERCARE: Primary | ICD-10-CM

## 2025-02-07 DIAGNOSIS — M25.661 DECREASED RANGE OF MOTION (ROM) OF RIGHT KNEE: ICD-10-CM

## 2025-02-07 DIAGNOSIS — R26.9 GAIT DISTURBANCE: ICD-10-CM

## 2025-02-07 DIAGNOSIS — Z96.651 S/P TKR (TOTAL KNEE REPLACEMENT), RIGHT: ICD-10-CM

## 2025-02-07 NOTE — PROGRESS NOTES
Physical Therapy Daily Treatment Note      Patient: Rupesh Fox   : 1950  Referring practitioner: Sergo Agustin MD  Date of Initial Visit: Type: THERAPY  Noted: 2025  Today's Date:  2025  Patient seen for 5 sessions         Rupesh Fox reports: good follow up with surgeon; prescribed steroid pack but haven't started it yet. Ace wrap throughout the day for swelling control; can take off at night. Pain is controlled; taking medication at night.               Objective     R knee flexion 105 degrees AAROM  R knee flexion 110 degrees PROM  R knee ext 0 degrees AROM    R knee swelling at joint line: 45 cm with ACE wrap on    Mild ext lag with SLR    See Exercise, Manual, and Modality Logs for complete treatment.       Assessment/Plan  Pt progressing well in PT with improving ROM consistently in flexion and ext; tightness at end ranges of motion. Pain is tolerable. Good quad contraction.  Pt using quad cane for support in the community; no AD at home. Added resisted HS curls today. Discussed what exercises to prioritize with his HEP especially completing 2x per day along with regularly icing. IFC and ice at end of session for pain and swelling control.       Progress per Plan of Care and Progress strengthening /stabilization /functional activity         Timed:  Manual Therapy:    5     mins  72558;  Therapeutic Exercise:    25     mins  53447;     Neuromuscular [place]:    -    mins  39627;    Therapeutic Activity:     10     mins  84753;     Gait Training:      -     mins  83662;     Ultrasound:     -     mins  09258;      Untimed:  Electrical Stimulation:    15     mins  35014 ( );  Mechanical Traction:    -     mins  60243;   Dry needling:      -     mins  26355/    Timed Treatment:   40   mins   Total Treatment:     80   mins    Decreased units billed to account for divided time  Haylie Pradhan, PT  Physical Therapist    License #: [id number]

## 2025-02-14 ENCOUNTER — TREATMENT (OUTPATIENT)
Dept: PHYSICAL THERAPY | Facility: CLINIC | Age: 75
End: 2025-02-14
Payer: MEDICARE

## 2025-02-14 DIAGNOSIS — Z47.89 ORTHOPEDIC AFTERCARE: Primary | ICD-10-CM

## 2025-02-14 DIAGNOSIS — Z96.651 S/P TKR (TOTAL KNEE REPLACEMENT), RIGHT: ICD-10-CM

## 2025-02-14 DIAGNOSIS — M25.561 ACUTE PAIN OF RIGHT KNEE: ICD-10-CM

## 2025-02-14 DIAGNOSIS — R26.9 GAIT DISTURBANCE: ICD-10-CM

## 2025-02-14 DIAGNOSIS — M25.661 DECREASED RANGE OF MOTION (ROM) OF RIGHT KNEE: ICD-10-CM

## 2025-02-14 NOTE — PROGRESS NOTES
Physical Therapy Daily Treatment Note      Patient: Rupesh Fox   : 1950  Referring practitioner: Sergo Agustin MD  Date of Initial Visit: Type: THERAPY  Noted: 2025  Today's Date:  2025  Patient seen for 6 sessions         Rupesh Fox reports: R knee pain is 1-2/10 and got a recumbent bike for home he is using to keep it loose.               Objective     R knee flexion: AAROM 122 degrees supine with strap  R knee ext AROM: 0 degrees      See Exercise, Manual, and Modality Logs for complete treatment.       Assessment/Plan  Pt able to add step ups forward, lateral and retro today; most difficulty with retro step ups having to go to 4 inch step up. Pt with fair quad contraction with quad set but good knee ext and good quad activation with SAQ/LAQ. Improving R knee ROM each week. Ended with IFC and ice at end of session for swelling and pain control.        Progress per Plan of Care and Progress strengthening /stabilization /functional activity           Timed:  Manual Therapy:    5     mins  17833;  Therapeutic Exercise:    15     mins  98615;     Neuromuscular [place]:    -    mins  11755;    Therapeutic Activity:     10     mins  28207;     Gait Training:      -     mins  66775;     Ultrasound:     -     mins  23915;      Untimed:  Electrical Stimulation:    15     mins  40461 ( );  Mechanical Traction:    -     mins  23588;   Dry needling:      -     mins  86548/    Timed Treatment:   30   mins   Total Treatment:     60   mins    Decreased units billed to account for divided time  Haylie Pradhan PT  Physical Therapist    License #: [id number]

## 2025-02-17 ENCOUNTER — TREATMENT (OUTPATIENT)
Dept: PHYSICAL THERAPY | Facility: CLINIC | Age: 75
End: 2025-02-17
Payer: MEDICARE

## 2025-02-17 DIAGNOSIS — R26.9 GAIT DISTURBANCE: ICD-10-CM

## 2025-02-17 DIAGNOSIS — Z96.651 S/P TKR (TOTAL KNEE REPLACEMENT), RIGHT: ICD-10-CM

## 2025-02-17 DIAGNOSIS — M25.561 ACUTE PAIN OF RIGHT KNEE: ICD-10-CM

## 2025-02-17 DIAGNOSIS — M25.661 DECREASED RANGE OF MOTION (ROM) OF RIGHT KNEE: ICD-10-CM

## 2025-02-17 DIAGNOSIS — Z47.89 ORTHOPEDIC AFTERCARE: Primary | ICD-10-CM

## 2025-02-17 PROCEDURE — G0283 ELEC STIM OTHER THAN WOUND: HCPCS | Performed by: PHYSICAL THERAPIST

## 2025-02-17 PROCEDURE — 97530 THERAPEUTIC ACTIVITIES: CPT | Performed by: PHYSICAL THERAPIST

## 2025-02-17 PROCEDURE — 97110 THERAPEUTIC EXERCISES: CPT | Performed by: PHYSICAL THERAPIST

## 2025-02-20 ENCOUNTER — OFFICE VISIT (OUTPATIENT)
Dept: INTERNAL MEDICINE | Facility: CLINIC | Age: 75
End: 2025-02-20
Payer: MEDICARE

## 2025-02-20 VITALS
DIASTOLIC BLOOD PRESSURE: 82 MMHG | BODY MASS INDEX: 24.65 KG/M2 | HEIGHT: 73 IN | OXYGEN SATURATION: 99 % | SYSTOLIC BLOOD PRESSURE: 130 MMHG | WEIGHT: 186 LBS | RESPIRATION RATE: 16 BRPM | HEART RATE: 64 BPM

## 2025-02-20 DIAGNOSIS — I10 ESSENTIAL HYPERTENSION: ICD-10-CM

## 2025-02-20 DIAGNOSIS — E11.649 TYPE 2 DIABETES MELLITUS WITH HYPOGLYCEMIA WITHOUT COMA, WITH LONG-TERM CURRENT USE OF INSULIN: Primary | ICD-10-CM

## 2025-02-20 DIAGNOSIS — E78.2 MIXED HYPERLIPIDEMIA: ICD-10-CM

## 2025-02-20 DIAGNOSIS — Z79.4 TYPE 2 DIABETES MELLITUS WITH HYPOGLYCEMIA WITHOUT COMA, WITH LONG-TERM CURRENT USE OF INSULIN: Primary | ICD-10-CM

## 2025-02-20 PROCEDURE — 3075F SYST BP GE 130 - 139MM HG: CPT | Performed by: INTERNAL MEDICINE

## 2025-02-20 PROCEDURE — 1126F AMNT PAIN NOTED NONE PRSNT: CPT | Performed by: INTERNAL MEDICINE

## 2025-02-20 PROCEDURE — 3044F HG A1C LEVEL LT 7.0%: CPT | Performed by: INTERNAL MEDICINE

## 2025-02-20 PROCEDURE — 99214 OFFICE O/P EST MOD 30 MIN: CPT | Performed by: INTERNAL MEDICINE

## 2025-02-20 PROCEDURE — 3079F DIAST BP 80-89 MM HG: CPT | Performed by: INTERNAL MEDICINE

## 2025-02-20 RX ORDER — ESOMEPRAZOLE MAGNESIUM 40 MG/1
40 CAPSULE, DELAYED RELEASE ORAL
COMMUNITY
Start: 2025-02-18

## 2025-02-21 ENCOUNTER — TREATMENT (OUTPATIENT)
Dept: PHYSICAL THERAPY | Facility: CLINIC | Age: 75
End: 2025-02-21
Payer: MEDICARE

## 2025-02-21 DIAGNOSIS — Z47.89 ORTHOPEDIC AFTERCARE: Primary | ICD-10-CM

## 2025-02-21 DIAGNOSIS — R26.9 GAIT DISTURBANCE: ICD-10-CM

## 2025-02-21 DIAGNOSIS — M25.661 DECREASED RANGE OF MOTION (ROM) OF RIGHT KNEE: ICD-10-CM

## 2025-02-21 DIAGNOSIS — M25.561 ACUTE PAIN OF RIGHT KNEE: ICD-10-CM

## 2025-02-21 DIAGNOSIS — Z96.651 S/P TKR (TOTAL KNEE REPLACEMENT), RIGHT: ICD-10-CM

## 2025-02-21 LAB
ALBUMIN SERPL-MCNC: 3.6 G/DL (ref 3.5–5.2)
ALBUMIN/GLOB SERPL: 1.4 G/DL
ALP SERPL-CCNC: 103 U/L (ref 39–117)
ALT SERPL-CCNC: 22 U/L (ref 1–41)
AST SERPL-CCNC: 24 U/L (ref 1–40)
BASOPHILS # BLD AUTO: 0.06 10*3/MM3 (ref 0–0.2)
BASOPHILS NFR BLD AUTO: 0.7 % (ref 0–1.5)
BILIRUB SERPL-MCNC: 0.3 MG/DL (ref 0–1.2)
BUN SERPL-MCNC: 24 MG/DL (ref 8–23)
BUN/CREAT SERPL: 21.4 (ref 7–25)
CALCIUM SERPL-MCNC: 9.4 MG/DL (ref 8.6–10.5)
CHLORIDE SERPL-SCNC: 103 MMOL/L (ref 98–107)
CHOLEST SERPL-MCNC: 131 MG/DL (ref 0–200)
CO2 SERPL-SCNC: 27.9 MMOL/L (ref 22–29)
CREAT SERPL-MCNC: 1.12 MG/DL (ref 0.76–1.27)
EGFRCR SERPLBLD CKD-EPI 2021: 68.5 ML/MIN/1.73
EOSINOPHIL # BLD AUTO: 0.19 10*3/MM3 (ref 0–0.4)
EOSINOPHIL NFR BLD AUTO: 2.1 % (ref 0.3–6.2)
ERYTHROCYTE [DISTWIDTH] IN BLOOD BY AUTOMATED COUNT: 12 % (ref 12.3–15.4)
GLOBULIN SER CALC-MCNC: 2.6 GM/DL
GLUCOSE SERPL-MCNC: 154 MG/DL (ref 65–99)
HBA1C MFR BLD: 6.2 % (ref 4.8–5.6)
HCT VFR BLD AUTO: 35.4 % (ref 37.5–51)
HDLC SERPL-MCNC: 50 MG/DL (ref 40–60)
HGB BLD-MCNC: 11.7 G/DL (ref 13–17.7)
IMM GRANULOCYTES # BLD AUTO: 0.02 10*3/MM3 (ref 0–0.05)
IMM GRANULOCYTES NFR BLD AUTO: 0.2 % (ref 0–0.5)
LDLC SERPL CALC-MCNC: 60 MG/DL (ref 0–100)
LYMPHOCYTES # BLD AUTO: 2.58 10*3/MM3 (ref 0.7–3.1)
LYMPHOCYTES NFR BLD AUTO: 28.1 % (ref 19.6–45.3)
MCH RBC QN AUTO: 31.2 PG (ref 26.6–33)
MCHC RBC AUTO-ENTMCNC: 33.1 G/DL (ref 31.5–35.7)
MCV RBC AUTO: 94.4 FL (ref 79–97)
MONOCYTES # BLD AUTO: 0.71 10*3/MM3 (ref 0.1–0.9)
MONOCYTES NFR BLD AUTO: 7.7 % (ref 5–12)
NEUTROPHILS # BLD AUTO: 5.61 10*3/MM3 (ref 1.7–7)
NEUTROPHILS NFR BLD AUTO: 61.2 % (ref 42.7–76)
NRBC BLD AUTO-RTO: 0 /100 WBC (ref 0–0.2)
PLATELET # BLD AUTO: 163 10*3/MM3 (ref 140–450)
POTASSIUM SERPL-SCNC: 4.9 MMOL/L (ref 3.5–5.2)
PROT SERPL-MCNC: 6.2 G/DL (ref 6–8.5)
RBC # BLD AUTO: 3.75 10*6/MM3 (ref 4.14–5.8)
SODIUM SERPL-SCNC: 140 MMOL/L (ref 136–145)
TRIGL SERPL-MCNC: 117 MG/DL (ref 0–150)
TSH SERPL DL<=0.005 MIU/L-ACNC: 1.42 UIU/ML (ref 0.27–4.2)
VLDLC SERPL CALC-MCNC: 21 MG/DL (ref 5–40)
WBC # BLD AUTO: 9.17 10*3/MM3 (ref 3.4–10.8)

## 2025-02-21 NOTE — PROGRESS NOTES
"Chief Complaint  Follow-up (3 mo f/u for HTN and Diabetes)    Subjective        Rupesh Fox presents to [facility]  History of Present Illness  Answers submitted by the patient for this visit:  Diabetes Questionnaire (Submitted on 2/13/2025)  Chief Complaint: Diabetes problem  Below 70: never    Here for follow up T2DM, HTN, HLD  Taking all meds as prescribed, no side effects  Post op R TKA - doing pretty good, some swelling still, recently did steroid course which helped, now about 4 weeks post op    Objective   Vital Signs:  /82   Pulse 64   Resp 16   Ht 185.4 cm (73\")   Wt 84.4 kg (186 lb)   SpO2 99%   BMI 24.54 kg/m²   Estimated body mass index is 24.54 kg/m² as calculated from the following:    Height as of this encounter: 185.4 cm (73\").    Weight as of this encounter: 84.4 kg (186 lb).    BMI is within normal parameters. No other follow-up for BMI required.      Physical Exam  Vitals and nursing note reviewed.   Constitutional:       General: He is not in acute distress.     Appearance: Normal appearance. He is not toxic-appearing.   HENT:      Head: Normocephalic and atraumatic.      Right Ear: External ear normal.      Left Ear: External ear normal.      Nose: Nose normal.   Eyes:      General: No scleral icterus.        Right eye: No discharge.         Left eye: No discharge.      Extraocular Movements: Extraocular movements intact.      Conjunctiva/sclera: Conjunctivae normal.      Pupils: Pupils are equal, round, and reactive to light.   Cardiovascular:      Rate and Rhythm: Normal rate and regular rhythm.      Pulses: Normal pulses.      Heart sounds: Normal heart sounds. No murmur heard.  Pulmonary:      Effort: Pulmonary effort is normal.   Musculoskeletal:         General: Normal range of motion.   Skin:     General: Skin is warm.      Capillary Refill: Capillary refill takes less than 2 seconds.   Neurological:      General: No focal " deficit present.      Mental Status: He is alert and oriented to person, place, and time. Mental status is at baseline.      Cranial Nerves: No cranial nerve deficit.      Gait: Gait normal.   Psychiatric:         Mood and Affect: Mood normal.         Behavior: Behavior normal.         Thought Content: Thought content normal.         Judgment: Judgment normal.        Result Review :  The following data was reviewed by: Leland Carson MD on 02/20/2025:  Common labs          11/6/2024    10:30 1/7/2025    11:01 2/20/2025    14:07   Common Labs   Glucose 95  120  154    BUN 25  22  24    Creatinine 1.11  0.99  1.12    Sodium 139  136  140    Potassium 5.1  4.3  4.9    Chloride 105  103  103    Calcium 9.6  9.2  9.4    Albumin 4.3   3.6    Total Bilirubin 0.6   0.3    Alkaline Phosphatase 75   103    AST (SGOT) 24   24    ALT (SGPT) 18   22    WBC 6.65  6.00  9.17    Hemoglobin 13.0  13.1  11.7    Hematocrit 38.8  41.3  35.4    Platelets 167  169  163    Total Cholesterol 137   131    Triglycerides 53   117    HDL Cholesterol 60   50    LDL Cholesterol  65   60    Hemoglobin A1C 6.30   6.20    Microalbumin, Urine <3.0      PSA 0.566        Data reviewed : prior office notes reviewed           Assessment and Plan   Diagnoses and all orders for this visit:    1. Type 2 diabetes mellitus with hypoglycemia without coma, with long-term current use of insulin (Primary)  -     CBC w AUTO Differential  -     Hemoglobin A1c    2. Essential hypertension  -     Comprehensive metabolic panel  -     TSH    3. Mixed hyperlipidemia  -     Comprehensive metabolic panel  -     Lipid panel    Check labs as above for ongoing monitoring  Adjust meds pending lab results  Post op R knee total knee - doing fairly well       I spent 15 minutes caring for Rupesh on this date of service. This time includes time spent by me in the following activities:preparing for the visit, reviewing tests, obtaining and/or reviewing a separately obtained history,  performing a medically appropriate examination and/or evaluation , counseling and educating the patient/family/caregiver, ordering medications, tests, or procedures, and documenting information in the medical record  Follow Up   F/u for AWV  Patient was given instructions and counseling regarding his condition or for health maintenance advice. Please see specific information pulled into the AVS if appropriate.     Leland Carson MD  [facility]  [address] [place] [address]  Phone: [phone]

## 2025-02-21 NOTE — PROGRESS NOTES
Physical Therapy Daily Treatment Note      Patient: Rupesh Fox   : 1950  Referring practitioner: Sergo Agustin MD  Date of Initial Visit: Type: THERAPY  Noted: 2025  Today's Date:  2025  Patient seen for 8 sessions         Rupesh Fox reports: shorter window of muscle spasms; pain is controlled               Objective     R knee flexion AAROM: 125 degrees    R knee flexion AROM: 120 degrees    R knee ext AROM: 0 degrees    See Exercise, Manual, and Modality Logs for complete treatment.       Assessment/Plan  Pt with good ROM of R knee; decreasing muscle spasms at night. Modified LE stretches today to be able to do at home at night to help prevent muscle spasms in calf and HS. Increased ankle weight with SAQ and LAQ today, increased resistance with HS curls and increased reps of mat table exercises as tolerated. Pt able to complete retro step up on 6 inch step today with decreasing compensation. Also added leg press today to continue to progress strengthening. Ice at end of session; IFC deferred due to controlled pain so assess soreness next session without use of e-stim.        Progress per Plan of Care and Progress strengthening /stabilization /functional activity           Timed:  Manual Therapy:    2     mins  02502;  Therapeutic Exercise:    28     mins  22366;     Neuromuscular [place]:    -    mins  91293;    Therapeutic Activity:     15     mins  53586;     Gait Training:      -     mins  05561;     Ultrasound:     -     mins  69911;      Untimed:  Electrical Stimulation:    15     mins  04729 ( );  Mechanical Traction:    -     mins  42593;   Dry needling:      -     mins  06066/    Timed Treatment:   45   mins   Total Treatment:     60   mins  Haylie Pradhan PT  Physical Therapist    License #: [id number]

## 2025-02-24 ENCOUNTER — TREATMENT (OUTPATIENT)
Dept: PHYSICAL THERAPY | Facility: CLINIC | Age: 75
End: 2025-02-24
Payer: MEDICARE

## 2025-02-24 DIAGNOSIS — R26.9 GAIT DISTURBANCE: ICD-10-CM

## 2025-02-24 DIAGNOSIS — Z47.89 ORTHOPEDIC AFTERCARE: Primary | ICD-10-CM

## 2025-02-24 DIAGNOSIS — M25.661 DECREASED RANGE OF MOTION (ROM) OF RIGHT KNEE: ICD-10-CM

## 2025-02-24 DIAGNOSIS — Z96.651 S/P TKR (TOTAL KNEE REPLACEMENT), RIGHT: ICD-10-CM

## 2025-02-24 DIAGNOSIS — M25.561 ACUTE PAIN OF RIGHT KNEE: ICD-10-CM

## 2025-02-24 PROCEDURE — 97530 THERAPEUTIC ACTIVITIES: CPT | Performed by: PHYSICAL THERAPIST

## 2025-02-24 PROCEDURE — 97110 THERAPEUTIC EXERCISES: CPT | Performed by: PHYSICAL THERAPIST

## 2025-02-24 NOTE — PROGRESS NOTES
Re-Assessment / Re-Certification      Patient: Rupesh Fox   : 1950  Diagnosis/ICD-10 Code:  Orthopedic aftercare [Z47.89]  Referring practitioner: Sergo Agustin MD  Date of Initial Visit: Type: THERAPY  Noted: 2025  Today's Date:  2025  Patient seen for 9 sessions      Subjective:   Rupesh Fox reports: muscle spams at night are decreasing; still feels like I am not walking normally  Subjective Questionnaire: LEFS: , improved from   Clinical Progress: improved  Home Program Compliance: Yes  Treatment has included: therapeutic exercise, neuromuscular re-education, manual therapy, therapeutic activity, gait training, and cryotherapy    Subjective   Objective     Observations      Right Knee   Positive for edema, effusion and incision.   No s/s of infection     Palpation      Right   Hypertonic in the distal biceps femoris, distal semimembranosus, distal semitendinosus, lateral gastrocnemius, medial gastrocnemius, rectus femoris, vastus lateralis and vastus medialis. Tenderness of the rectus femoris and vastus medialis.      Tenderness      Right Knee   Tenderness in the lateral joint line, medial joint line, patellar tendon and quadriceps tendon.      Active Range of Motion      Right Knee   Flexion: 128 degrees with tightness  Extension: 3 degrees     Patellar Mobility      Right Knee WNL in the medial, lateral, superior and inferior patellar tendon(s).      Strength/Myotome Testing      Left Knee   Normal strength     Right Knee   Flexion: 4-  Extension: 4  Quadriceps contraction: fair  Mild ext lag with SLR     Tests      Additional Tests Details  Negative Thomas's sign RLE     Swelling      Right Knee Girth Measurement (cm)   Joint line: 42 cm  10 cm above joint line: 42 cm  10 cm below joint line: 36 cm .     Ambulation   Weight-Bearing Status   Weight-Bearing Status (Left): weight-bearing as tolerated   Weight-Bearing Status (Right): weight-bearing as tolerated    Assistive  device used: NONE     Observational Gait   Gait: antalgic mildly without AD     Quality of Movement During Gait      Knee     Knee (Right): Positive stiff knee.      Assessment & Plan       Assessment  Impairments: abnormal gait, abnormal muscle firing, abnormal or restricted ROM, activity intolerance, impaired balance, impaired physical strength, lacks appropriate home exercise program, pain with function and weight-bearing intolerance   Functional limitations: carrying objects, sleeping, walking, uncomfortable because of pain, sitting, standing, stooping and unable to perform repetitive tasks   Assessment details: Rupesh Fox is a 74 y.o. year-old male referred to physical therapy for s/p R TKA on 1/21/25; pt is 5 week post op and doing remarkably well. He demonstrates R knee ROM 0-128 degrees, improving strength and stability; progressed from Rwx to cane to no AD with only mild antalgia. Decreased swelling and muscle spasms. Patient is appropriate for skilled physical therapy in order to reduce pain and increase ease with daily mobility.  During therapy, pt educated on anatomy, goal of interventions, positioning, ice, and body mechanics to promote healthy lifestyle and improve quality of life.   Prognosis: good  Prognosis details: Drove himself to PT today, no issues    Goals  Plan Goals: STG: ~6 weeks  1. Pt will be demonstrate 0-110 degrees of knee extension and flexion to be able to sit in a chair properly-MET  2. Pt will be able to complete car transfer with tolerable pain (2/10 or less)-MET  3. Pt will be able to complete 12 steps reciprocally with single HR ascending and descending-PROGRESSING  4. Pt will demonstrate a SLR without extensor lag to improve terminal knee extension-PROGRESSING    LTG: ~12 weeks  1. Pt will be demonstrate 0-125 degrees of knee extension and flexion to be able to ascend/descend stairs-MET  2. Pt will improve LEFS score to 55/80 to improve subjective function of LE with  ADLs-PROGRESSING  3. Pt will be able to walk for 30 minutes without AD and pain 2/10 or less-PROGRESSING  4. 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 Pt will be I with HEP-PROGRESSING      Plan  Therapy options: will be seen for skilled therapy services  Planned modality interventions: cryotherapy, electrical stimulation/Russian stimulation, thermotherapy (hydrocollator packs), ultrasound, dry needling and TENS  Planned therapy interventions: ADL retraining, balance/weight-bearing training, body mechanics training, flexibility, functional ROM exercises, gait training, home exercise program, joint mobilization, manual therapy, neuromuscular re-education, postural training, soft tissue mobilization, spinal/joint mobilization, strengthening, stretching and therapeutic activities  Treatment plan discussed with: patient  Plan details: 2 times per week 8 weeks        Visit Diagnoses:    ICD-10-CM ICD-9-CM   1. Orthopedic aftercare  Z47.89 V54.9   2. S/P TKR (total knee replacement), right  Z96.651 V43.65   3. Decreased range of motion (ROM) of right knee  M25.661 719.56   4. Gait disturbance  R26.9 781.2   5. Acute pain of right knee  M25.561 719.46       PT Signature: Haylie Pradhan PT  [place] license: [id number]      Timed:  Manual Therapy:    5     mins  34344;  Therapeutic Exercise:    25     mins  12323;     Neuromuscular [place]:    -    mins  87411;    Therapeutic Activity:     15     mins  71664;     Gait Training:      -     mins  78373;     Ultrasound:     -     mins  00408;    Electrical Stimulation:    -     mins  28997 ( );    Untimed:  Electrical Stimulation:    -     mins  75419 ( );  Mechanical Traction:    -     mins  88308; \  Dry needling:      -     mins  01056/20561    Timed Treatment:   45   mins   Total Treatment:     60   mins

## 2025-02-27 ENCOUNTER — TREATMENT (OUTPATIENT)
Dept: PHYSICAL THERAPY | Facility: CLINIC | Age: 75
End: 2025-02-27
Payer: MEDICARE

## 2025-02-27 DIAGNOSIS — Z96.651 S/P TKR (TOTAL KNEE REPLACEMENT), RIGHT: ICD-10-CM

## 2025-02-27 DIAGNOSIS — M25.661 DECREASED RANGE OF MOTION (ROM) OF RIGHT KNEE: ICD-10-CM

## 2025-02-27 DIAGNOSIS — R26.9 GAIT DISTURBANCE: ICD-10-CM

## 2025-02-27 DIAGNOSIS — Z47.89 ORTHOPEDIC AFTERCARE: Primary | ICD-10-CM

## 2025-02-27 DIAGNOSIS — M25.561 ACUTE PAIN OF RIGHT KNEE: ICD-10-CM

## 2025-02-27 NOTE — PROGRESS NOTES
Physical Therapy Daily Treatment Note      Patient: Rupesh Fox   : 1950  Referring practitioner: Sergo Agustin MD  Date of Initial Visit: Type: THERAPY  Noted: 2025  Today's Date:  2025  Patient seen for 10 sessions         Rupesh Fox reports: more activity with more steps with increased soreness but denies increased pain. No spasms the past few days.               Objective     R knee ROM: 0-127 degrees    See Exercise, Manual, and Modality Logs for complete treatment.       Assessment/Plan  Pt able to progress to 8 inch step up forward and lateral; continued 6 inch for retro step up due to compensation. Also added mini squats today to work on bending over and reaching to the floor functionally with noted tightness at low point of squat but no pain. Pt appropriate for decrease to 1x per week with good maintenance of ROM with no need for PROM today but continue to progress strength as tolerated.        Progress per Plan of Care and Progress strengthening /stabilization /functional activity           Timed:  Manual Therapy:    -     mins  58529;  Therapeutic Exercise:    30     mins  65159;     Neuromuscular [place]:    -    mins  22550;    Therapeutic Activity:     10     mins  80360;     Gait Training:      -     mins  05306;     Ultrasound:     -     mins  71423;      Untimed:  Electrical Stimulation:    -     mins  32656 ( );  Mechanical Traction:    -     mins  94885;   Dry needling:      -     mins  55852/    Timed Treatment:   40   mins   Total Treatment:     50   mins  Haylie Pradhan PT  Physical Therapist    License #: [id number]

## 2025-03-05 ENCOUNTER — OFFICE VISIT (OUTPATIENT)
Dept: ORTHOPEDIC SURGERY | Facility: CLINIC | Age: 75
End: 2025-03-05
Payer: MEDICARE

## 2025-03-05 VITALS — WEIGHT: 186 LBS | BODY MASS INDEX: 24.65 KG/M2 | HEIGHT: 73 IN

## 2025-03-05 DIAGNOSIS — Z96.651 S/P TKR (TOTAL KNEE REPLACEMENT), RIGHT: Primary | ICD-10-CM

## 2025-03-05 PROCEDURE — 1159F MED LIST DOCD IN RCRD: CPT | Performed by: ORTHOPAEDIC SURGERY

## 2025-03-05 PROCEDURE — 1160F RVW MEDS BY RX/DR IN RCRD: CPT | Performed by: ORTHOPAEDIC SURGERY

## 2025-03-05 PROCEDURE — 99024 POSTOP FOLLOW-UP VISIT: CPT | Performed by: ORTHOPAEDIC SURGERY

## 2025-03-05 RX ORDER — PREDNISONE 10 MG/1
TABLET ORAL
Qty: 39 TABLET | Refills: 0 | Status: SHIPPED | OUTPATIENT
Start: 2025-03-05

## 2025-03-05 RX ORDER — PREDNISONE 10 MG/1
TABLET ORAL
Qty: 39 TABLET | Refills: 0 | Status: SHIPPED | OUTPATIENT
Start: 2025-03-05 | End: 2025-03-05 | Stop reason: SDUPTHER

## 2025-03-05 NOTE — PROGRESS NOTES
CC: s/p right total knee arthroplasty, DOS 1/21/2025  Interval History: Rupesh Fox returns for 6 week postoperative visit.  He is doing well. Pain is controlled with pain medication and is  improving. He denies any wound problem, fevers, or chills. Patient is continuing to work with outpatient PT. Ambulating without cane.     Physical Examination: Right knee was examined   Incision well healed   ROM 0-130,  4+/5 strength   Stable to varus and valgus stress   Flex/extend ankle and toes   Positive sensation right foot   No calf pain, negative Homans sign bilaterally    Radiographic review: Radiographs of the right knee 3 views, AP, lateral and patella axial views, compared to immediate postop films, indication s/p TKA,  shows that the implant is in good position. There is no evidence of implant loosening or osteolysis, no dislocation or periprosthetic fractures. Overall alignment acceptable at this time.     Assessment/Plan:  Rupesh Fox is recovering from surgery as expected.  We will add a prednisone taper to try to help reduce some of the additional swelling that he does have that does wax and wane not only in his knee but also lower into his right lower extremity.  He has no calf pain and negative Homans' sign.  We will continue outpatient and home therapy for range of motion, strengthening, and gait normalization. .  He is to follow up in clinic in 6 weeks with no xrays. Patient had all question answered today. Discussed need for prophylactic antibiotics with dental/endoscopy procedures.     Medications:  No orders of the defined types were placed in this encounter.      Sergo Agustin MD

## 2025-03-06 ENCOUNTER — TREATMENT (OUTPATIENT)
Dept: PHYSICAL THERAPY | Facility: CLINIC | Age: 75
End: 2025-03-06
Payer: MEDICARE

## 2025-03-06 DIAGNOSIS — M25.561 ACUTE PAIN OF RIGHT KNEE: ICD-10-CM

## 2025-03-06 DIAGNOSIS — Z96.651 S/P TKR (TOTAL KNEE REPLACEMENT), RIGHT: ICD-10-CM

## 2025-03-06 DIAGNOSIS — R26.9 GAIT DISTURBANCE: ICD-10-CM

## 2025-03-06 DIAGNOSIS — Z47.89 ORTHOPEDIC AFTERCARE: Primary | ICD-10-CM

## 2025-03-06 DIAGNOSIS — M25.661 DECREASED RANGE OF MOTION (ROM) OF RIGHT KNEE: ICD-10-CM

## 2025-03-06 NOTE — PROGRESS NOTES
Physical Therapy Daily Treatment Note      Patient: Rupesh Fox   : 1950  Referring practitioner: Sergo Agustin MD  Date of Initial Visit: Type: THERAPY  Noted: 2025  Today's Date:  3/6/2025  Patient seen for 11 sessions         Rupesh Fox reports: good follow with Dr. Agustin and was pleased with X ray and started on another steroid pack due to lower leg swelling.             Objective   See Exercise, Manual, and Modality Logs for complete treatment.       Assessment/Plan  Pt with more muscle fatigue noted today during exercises; especially at quads with step ups with cues for full knee ext. Attempted 8 inch retro step and able to complete 5 reps prior to having to move to 6 inch step. Increased depth of squats with good technique. Pt reports muscle fatigue and R knee soreness at end of session; ice at end of session.        Progress per Plan of Care and Progress strengthening /stabilization /functional activity           Timed:  Manual Therapy:    -     mins  75130;  Therapeutic Exercise:    30     mins  25046;     Neuromuscular [place]:    -    mins  24107;    Therapeutic Activity:     15     mins  78190;     Gait Training:      -     mins  37455;     Ultrasound:     -     mins  40303;      Untimed:  Electrical Stimulation:    -     mins  98126 ( );  Mechanical Traction:    -     mins  12323;   Dry needling:      -     mins  01276/    Timed Treatment:   45   mins   Total Treatment:     55   mins  Haylie Pradhan PT  Physical Therapist    License #: [id number]

## 2025-03-14 ENCOUNTER — TREATMENT (OUTPATIENT)
Dept: PHYSICAL THERAPY | Facility: CLINIC | Age: 75
End: 2025-03-14
Payer: MEDICARE

## 2025-03-14 DIAGNOSIS — Z47.89 ORTHOPEDIC AFTERCARE: Primary | ICD-10-CM

## 2025-03-14 DIAGNOSIS — M25.561 ACUTE PAIN OF RIGHT KNEE: ICD-10-CM

## 2025-03-14 DIAGNOSIS — M25.661 DECREASED RANGE OF MOTION (ROM) OF RIGHT KNEE: ICD-10-CM

## 2025-03-14 DIAGNOSIS — R26.9 GAIT DISTURBANCE: ICD-10-CM

## 2025-03-14 DIAGNOSIS — Z96.651 S/P TKR (TOTAL KNEE REPLACEMENT), RIGHT: ICD-10-CM

## 2025-03-14 NOTE — PROGRESS NOTES
Physical Therapy Daily Treatment Note      Patient: Rupesh Fox   : 1950  Referring practitioner: Sergo Agustin MD  Date of Initial Visit: Type: THERAPY  Noted: 2025  Today's Date:  3/14/2025  Patient seen for 12 sessions         Rupesh Fox reports: his R knee pain is minimal 1/10; steps are still difficult especially down steps; stiffness with prolonged standing. 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            Objective     R knee flexion:130 degrees with strap  See Exercise, Manual, and Modality Logs for complete treatment.       Assessment/Plan  Continued to progress RLE strength; able to progress to 2lb ankle weight with SLR in flexion and in ABD. Increased weight with SAQ and LAQ to 4 lbs today. Added supine bridges today to work on proximal LE strength. Still some tightness in the HS but no tightness in the R calf with stretches today. Improved retro step ups on 8 inch step today. Ice at end of session for pain and swelling control.        Progress per Plan of Care and Progress strengthening /stabilization /functional activity           Timed:  Manual Therapy:    -     mins  56536;  Therapeutic Exercise:    30     mins  96970;     Neuromuscular [place]:    -    mins  27741;    Therapeutic Activity:     15     mins  21148;     Gait Training:      -     mins  67746;     Ultrasound:     -     mins  55684;      Untimed:  Electrical Stimulation:    -     mins  19520 ( );  Mechanical Traction:    -     mins  92946;   Dry needling:      -     mins  92944/    Timed Treatment:   45   mins   Total Treatment:     55   mins  Haylie Pradhan PT  Physical Therapist    License #: [id number]

## 2025-03-20 ENCOUNTER — TREATMENT (OUTPATIENT)
Dept: PHYSICAL THERAPY | Facility: CLINIC | Age: 75
End: 2025-03-20
Payer: MEDICARE

## 2025-03-20 DIAGNOSIS — M25.561 ACUTE PAIN OF RIGHT KNEE: ICD-10-CM

## 2025-03-20 DIAGNOSIS — Z96.651 S/P TKR (TOTAL KNEE REPLACEMENT), RIGHT: ICD-10-CM

## 2025-03-20 DIAGNOSIS — R26.9 GAIT DISTURBANCE: ICD-10-CM

## 2025-03-20 DIAGNOSIS — M25.661 DECREASED RANGE OF MOTION (ROM) OF RIGHT KNEE: ICD-10-CM

## 2025-03-20 DIAGNOSIS — Z47.89 ORTHOPEDIC AFTERCARE: Primary | ICD-10-CM

## 2025-03-20 NOTE — PROGRESS NOTES
Physical Therapy Daily Treatment Note      Patient: Rupesh Fox   : 1950  Referring practitioner: Sergo Agustin MD  Date of Initial Visit: Type: THERAPY  Noted: 2025  Today's Date:  3/20/2025  Patient seen for 13 sessions         Rupesh Fox reports: ended steroid pack on Monday so feeling a little more stiff the past few days; 1 night of muscle spasms but didn't last long.               Objective     R knee flexion AAROM: 128 degrees  R knee ext AROM: 0 degrees    See Exercise, Manual, and Modality Logs for complete treatment.       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 Reports the recumbent bike really keeps him loose. Improving retro step up on 8 inch step; increased reps of bridges and squats with some lateral knee tightness thus added ITBand stretch today. Ice at end of session.        Progress per Plan of Care and Progress strengthening /stabilization /functional activity    Plan to discharge to [facility] next session         Timed:  Manual Therapy:    -     mins  22558;  Therapeutic Exercise:    30     mins  65904;     Neuromuscular Z[place]:    -    mins  11720;    Therapeutic Activity:     15     mins  42744;     Gait Training:      -     mins  21927;     Ultrasound:     -     mins  91468;      Untimed:  Electrical Stimulation:    -     mins  59679 ( );  Mechanical Traction:    -     mins  47900;   Dry needling:      -     mins  84184/    Timed Treatment:   45   mins   Total Treatment:     70   mins      Decreased units billed to account for divided time  Haylie Pradhan PT  Physical Therapist    License #: [id number]

## 2025-03-28 ENCOUNTER — TREATMENT (OUTPATIENT)
Dept: PHYSICAL THERAPY | Facility: CLINIC | Age: 75
End: 2025-03-28
Payer: MEDICARE

## 2025-03-28 DIAGNOSIS — Z96.651 S/P TKR (TOTAL KNEE REPLACEMENT), RIGHT: ICD-10-CM

## 2025-03-28 DIAGNOSIS — R26.9 GAIT DISTURBANCE: ICD-10-CM

## 2025-03-28 DIAGNOSIS — M25.561 ACUTE PAIN OF RIGHT KNEE: ICD-10-CM

## 2025-03-28 DIAGNOSIS — Z47.89 ORTHOPEDIC AFTERCARE: Primary | ICD-10-CM

## 2025-03-28 DIAGNOSIS — M25.661 DECREASED RANGE OF MOTION (ROM) OF RIGHT KNEE: ICD-10-CM

## 2025-03-28 NOTE — PROGRESS NOTES
Physical Therapy Daily Treatment Note + Discharge Summary      Patient: Rupesh Fox   : 1950  Referring practitioner: Sergo Agustin MD  Date of Initial Visit: Type: THERAPY  Noted: 2025  Today's Date:  3/28/2025  Patient seen for 14 sessions         Rupesh Fox reports: felt his R knee give out coming down the steps; feels tightness all the way up to lateral hip. Difficulty lowering weight on RLE. Went from light duty to moderate and just over did it.      LEFS: 51/80       Objective     Tenderness      Right Knee   Tenderness in the lateral joint line, medial joint line, patellar tendon, quadriceps tendon, ITband     Active Range of Motion      Right Knee   Flexion: 128 degrees   Extension: 0 degrees     Patellar Mobility      Right Knee WNL in the medial, lateral, superior and inferior patellar tendon(s).      Strength/Myotome Testing      Left Knee   Normal strength     Right Knee   Flexion: 4+  Extension: 5  Quadriceps contraction: good  No ext lag with SLR     Swelling      Right Knee Girth Measurement (cm)   Joint line: 42.5 cm  10 cm above joint line: 42.5 cm  10 cm below joint line: 36 cm .     Ambulation   Weight-Bearing Status   Weight-Bearing Status (Left): weight-bearing as tolerated   Weight-Bearing Status (Right): weight-bearing as tolerated    Assistive device used: NONE     Observational Gait   Gait: WNL    See Exercise, Manual, and Modality Logs for complete treatment.       Assessment/Plan  Rupesh Fox is a 74 y.o. year-old male referred to physical therapy for s/p R TKA on 25; pt is 9 week post op and doing remarkably well. He demonstrates R knee ROM 0-128 degrees, improving strength and stability; progressed from Rwx to cane to no AD with no antalgia. Decreased swelling and muscle spasms. Mild flare up with increased activity; with Itband flare up. Added manual AISTM today along ITBand with relief. Patient is appropriate for skilled physical therapy in order to  reduce pain and increase ease with daily mobility.  During therapy, pt educated on anatomy, goal of interventions, positioning, ice, and body mechanics to promote healthy lifestyle and improve quality of life.   Prognosis: good     Goals  Plan Goals: STG: ~6 weeks  1. Pt will be demonstrate 0-110 degrees of knee extension and flexion to be able to sit in a chair properly-MET  2. Pt will be able to complete car transfer with tolerable pain (2/10 or less)-MET  3. Pt will be able to complete 12 steps reciprocally with single HR ascending and descending-MET  4. Pt will demonstrate a SLR without extensor lag to improve terminal knee extension-MET     LTG: ~12 weeks  1. Pt will be demonstrate 0-125 degrees of knee extension and flexion to be able to ascend/descend stairs-MET  2. Pt will improve LEFS score to 55/80 to improve subjective function of LE with ADLs-PROGRESSING (51/80)  3. Pt will be able to walk for 30 minutes without AD and pain 2/10 or less-MET  4. 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 Pt will be I with HEP-MET     Plan  Discharge to [facility]          Timed:  Manual Therapy:    8     mins  81593;  Therapeutic Exercise:    22     mins  31784;     Neuromuscular Z[place]:    -    mins  67755;    Therapeutic Activity:     15     mins  49888;     Gait Training:      -     mins  74831;     Ultrasound:     -     mins  03253;      Untimed:  Electrical Stimulation:    -     mins  70071 ( );  Mechanical Traction:    -     mins  73702;   Dry needling:      -     mins  14737/20561    Timed Treatment:   45   mins   Total Treatment:     55   mins  Haylie Pradhan PT  Physical Therapist    License #: [id number]

## 2025-04-14 ENCOUNTER — OFFICE VISIT (OUTPATIENT)
Dept: ORTHOPEDIC SURGERY | Facility: CLINIC | Age: 75
End: 2025-04-14
Payer: MEDICARE

## 2025-04-14 VITALS — WEIGHT: 186 LBS | HEIGHT: 73 IN | BODY MASS INDEX: 24.65 KG/M2

## 2025-04-14 DIAGNOSIS — Z96.651 S/P TKR (TOTAL KNEE REPLACEMENT), RIGHT: Primary | ICD-10-CM

## 2025-04-14 PROCEDURE — 99024 POSTOP FOLLOW-UP VISIT: CPT | Performed by: ORTHOPAEDIC SURGERY

## 2025-04-14 PROCEDURE — 1160F RVW MEDS BY RX/DR IN RCRD: CPT | Performed by: ORTHOPAEDIC SURGERY

## 2025-04-14 PROCEDURE — 1159F MED LIST DOCD IN RCRD: CPT | Performed by: ORTHOPAEDIC SURGERY

## 2025-04-14 NOTE — PROGRESS NOTES
CC: s/p right total knee arthroplasty, DOS 1/21/2025  Interval History: Rupesh Fox returns for 12 week postoperative visit stating overall doing well, still has some difficulty going down stairs alternating steps but otherwise making good progress.     Physical Examination: Right knee was examined   Incision well healed   ROM 0-135,  4+/5 strength   Stable to varus and valgus stress   Flex/extend ankle and toes   Positive sensation right foot     Assessment/Plan:  Continue work on knee extension exercises as well as quad strengthening to try to improve his functionality.  He can progress with distance walking at this point in time as tolerated.  We will continue home therapy for range of motion, strengthening, and gait normalization.   He is to follow up in clinic in 9 months with repeat xrays.  Patient had all question answered today  Discussed need for prophylactic antibiotics with dental/endoscopy procedures.     Medications:  No orders of the defined types were placed in this encounter.      Sergo Agustin MD

## 2025-04-15 ENCOUNTER — ANESTHESIA EVENT (OUTPATIENT)
Dept: PERIOP | Facility: HOSPITAL | Age: 75
End: 2025-04-15
Payer: MEDICARE

## 2025-04-16 ENCOUNTER — ANESTHESIA (OUTPATIENT)
Dept: PERIOP | Facility: HOSPITAL | Age: 75
End: 2025-04-16
Payer: MEDICARE

## 2025-04-16 ENCOUNTER — HOSPITAL ENCOUNTER (OUTPATIENT)
Facility: HOSPITAL | Age: 75
Setting detail: HOSPITAL OUTPATIENT SURGERY
Discharge: HOME | End: 2025-04-16
Attending: INTERNAL MEDICINE | Admitting: INTERNAL MEDICINE
Payer: MEDICARE

## 2025-04-16 VITALS
RESPIRATION RATE: 11 BRPM | WEIGHT: 181.4 LBS | DIASTOLIC BLOOD PRESSURE: 77 MMHG | TEMPERATURE: 98.7 F | HEART RATE: 39 BPM | SYSTOLIC BLOOD PRESSURE: 132 MMHG | OXYGEN SATURATION: 96 % | BODY MASS INDEX: 23.93 KG/M2

## 2025-04-16 DIAGNOSIS — Z12.11 ENCOUNTER FOR SCREENING FOR MALIGNANT NEOPLASM OF COLON: ICD-10-CM

## 2025-04-16 LAB — GLUCOSE BLDC GLUCOMTR-MCNC: 143 MG/DL (ref 70–130)

## 2025-04-16 PROCEDURE — 25810000003 LACTATED RINGERS PER 1000 ML: Performed by: NURSE ANESTHETIST, CERTIFIED REGISTERED

## 2025-04-16 PROCEDURE — 25010000002 LIDOCAINE 2% SOLUTION: Performed by: NURSE ANESTHETIST, CERTIFIED REGISTERED

## 2025-04-16 PROCEDURE — 25010000002 PROPOFOL 200 MG/20ML EMULSION: Performed by: NURSE ANESTHETIST, CERTIFIED REGISTERED

## 2025-04-16 PROCEDURE — 82948 REAGENT STRIP/BLOOD GLUCOSE: CPT

## 2025-04-16 PROCEDURE — 88305 TISSUE EXAM BY PATHOLOGIST: CPT | Performed by: INTERNAL MEDICINE

## 2025-04-16 PROCEDURE — 45385 COLONOSCOPY W/LESION REMOVAL: CPT | Performed by: INTERNAL MEDICINE

## 2025-04-16 RX ORDER — LIDOCAINE HYDROCHLORIDE 20 MG/ML
INJECTION, SOLUTION INFILTRATION; PERINEURAL AS NEEDED
Status: DISCONTINUED | OUTPATIENT
Start: 2025-04-16 | End: 2025-04-16 | Stop reason: SURG

## 2025-04-16 RX ORDER — SODIUM CHLORIDE 9 MG/ML
40 INJECTION, SOLUTION INTRAVENOUS AS NEEDED
Status: DISCONTINUED | OUTPATIENT
Start: 2025-04-16 | End: 2025-04-16 | Stop reason: HOSPADM

## 2025-04-16 RX ORDER — ONDANSETRON 2 MG/ML
4 INJECTION INTRAMUSCULAR; INTRAVENOUS ONCE AS NEEDED
Status: DISCONTINUED | OUTPATIENT
Start: 2025-04-16 | End: 2025-04-16 | Stop reason: HOSPADM

## 2025-04-16 RX ORDER — PROPOFOL 10 MG/ML
INJECTION, EMULSION INTRAVENOUS AS NEEDED
Status: DISCONTINUED | OUTPATIENT
Start: 2025-04-16 | End: 2025-04-16 | Stop reason: SURG

## 2025-04-16 RX ORDER — SODIUM CHLORIDE 0.9 % (FLUSH) 0.9 %
10 SYRINGE (ML) INJECTION AS NEEDED
Status: DISCONTINUED | OUTPATIENT
Start: 2025-04-16 | End: 2025-04-16 | Stop reason: HOSPADM

## 2025-04-16 RX ORDER — SODIUM CHLORIDE, SODIUM LACTATE, POTASSIUM CHLORIDE, CALCIUM CHLORIDE 600; 310; 30; 20 MG/100ML; MG/100ML; MG/100ML; MG/100ML
9 INJECTION, SOLUTION INTRAVENOUS CONTINUOUS
Status: DISCONTINUED | OUTPATIENT
Start: 2025-04-16 | End: 2025-04-16 | Stop reason: HOSPADM

## 2025-04-16 RX ORDER — SODIUM CHLORIDE, SODIUM LACTATE, POTASSIUM CHLORIDE, CALCIUM CHLORIDE 600; 310; 30; 20 MG/100ML; MG/100ML; MG/100ML; MG/100ML
100 INJECTION, SOLUTION INTRAVENOUS ONCE
Status: DISCONTINUED | OUTPATIENT
Start: 2025-04-16 | End: 2025-04-16 | Stop reason: HOSPADM

## 2025-04-16 RX ORDER — SODIUM CHLORIDE 0.9 % (FLUSH) 0.9 %
10 SYRINGE (ML) INJECTION EVERY 12 HOURS SCHEDULED
Status: DISCONTINUED | OUTPATIENT
Start: 2025-04-16 | End: 2025-04-16 | Stop reason: HOSPADM

## 2025-04-16 RX ORDER — LIDOCAINE HYDROCHLORIDE 10 MG/ML
0.5 INJECTION, SOLUTION EPIDURAL; INFILTRATION; INTRACAUDAL; PERINEURAL ONCE AS NEEDED
Status: DISCONTINUED | OUTPATIENT
Start: 2025-04-16 | End: 2025-04-16 | Stop reason: HOSPADM

## 2025-04-16 RX ADMIN — LIDOCAINE HYDROCHLORIDE 10 MG: 20 INJECTION, SOLUTION INFILTRATION; PERINEURAL at 08:20

## 2025-04-16 RX ADMIN — PROPOFOL 75 MG: 10 INJECTION, EMULSION INTRAVENOUS at 08:24

## 2025-04-16 RX ADMIN — SODIUM CHLORIDE, SODIUM LACTATE, POTASSIUM CHLORIDE, CALCIUM CHLORIDE 9 ML/HR: 600; 310; 30; 20 INJECTION, SOLUTION INTRAVENOUS at 07:21

## 2025-04-16 RX ADMIN — PROPOFOL 50 MG: 10 INJECTION, EMULSION INTRAVENOUS at 08:31

## 2025-04-16 RX ADMIN — PROPOFOL 25 MG: 10 INJECTION, EMULSION INTRAVENOUS at 08:27

## 2025-04-16 NOTE — ANESTHESIA PREPROCEDURE EVALUATION
Anesthesia Evaluation     Patient summary reviewed and Nursing notes reviewed   no history of anesthetic complications:   NPO Solid Status: > 8 hours  NPO Liquid Status: > 8 hours           Airway   Mallampati: II  TM distance: >3 FB  Neck ROM: full  No difficulty expected  Dental - normal exam     Pulmonary - normal exam   (+) a smoker Former,Sleep apnea: snores.  Cardiovascular   Exercise tolerance: good (4-7 METS)    ECG reviewed  Rhythm: regular  Rate: abnormal    (+) hypertension well controlled, dysrhythmias (rate runs in the 40's) Bradycardia, hyperlipidemia    ROS comment: Progress Note    HEART RATE=42  bpm  RR Ytltlmis=0358  ms  VT Uwcbmfzk=467  ms  P Horizontal Axis=15  deg  P Front Axis=40  deg  QRSD Foyqxgic=651  ms  QT Tgmtcyri=134  ms  ALlX=452  ms  QRS Axis=-9  deg  T Wave Axis=44  deg  - ABNORMAL ECG -  Sinus bradycardia  Nonspecific  intraventricular conduction delay  NO PRIOR TRACING AVAILABLE FOR COMPARISON  Electronically Signed By: [facility]) 2025-01-07 11:21:51  Date and Time of Study:2025-01-07 10:24:30      2023  Interpretation Summary       ·  Left ventricular systolic function is normal. Calculated left ventricular EF = 61.2%  ·  Left ventricular diastolic function was normal.  ·  The left atrial cavity is moderate to severely dilated.      Neuro/Psych- negative ROS  GI/Hepatic/Renal/Endo    (+) GERD well controlled, diabetes mellitus (A1C 6.3) type 2 well controlled  (-) PUD    ROS Comment: Dysphagia  Ann's esophagus without dysplasia    Musculoskeletal     (-) neck painBack pain: occ.  Abdominal    Substance History   (+) alcohol use (social-occ)     OB/GYN          Other   arthritis,   history of cancer (skin) remission                      Anesthesia Plan    ASA 3     MAC     intravenous induction     Anesthetic plan, risks, benefits, and alternatives have been provided, discussed and informed consent has been obtained with: patient.  Pre-procedure education  provided  Use of blood products discussed with patient  Consented to blood products.    Plan discussed with CRNA.

## 2025-04-16 NOTE — H&P
Patient Care Team:  Leland Carson MD as PCP - General (Internal Medicine)    CHIEF COMPLAINT: Personal hx colon polyps    HISTORY OF PRESENT ILLNESS:  Last exam was 2017    Past Medical History:   Diagnosis Date    Allergic rhinitis     Arthritis of back     Arthritis of neck     Ann esophagus Feb 2023    Barretts esophagus     Benign prostatic hyperplasia without lower urinary tract symptoms     Bradycardia, unspecified     Cancer melanoma    Cataract 2022    Cellulitis and abscess of toe     Cervicalgia     Colon polyp     Deviated septum     Diabetes mellitus     Diabetic eye exam     5/14, 6/4/15 - negative, 6/16 - small change in vessel, 5/17 - negative, f/u 12 mo, 3/18 - negative, 4/19 - negative    Dysphagia     Enlarged prostate without lower urinary tract symptoms (luts)     Essential (primary) hypertension     Fracture, femur     Fracture, foot 1972    GERD (gastroesophageal reflux disease)     Gluteal tendinitis, unspecified hip     Hearing loss     Hip arthrosis     Hyperlipidemia, unspecified     Knee swelling     Low back strain     Pain in left knee     Pain in right shoulder     Problem with sexual function     Pure hypercholesterolemia     Sprain     Sprain of hip: Sprain of other specified sites of hip and thigh    Tear of meniscus of knee 2024    Tinnitus, unspecified ear     Trochanteric bursitis, left hip     Type 2 diabetes mellitus without complication      Past Surgical History:   Procedure Laterality Date    COLONOSCOPY      COLONOSCOPY N/A 12/06/2017    Procedure: COLONOSCOPY;  Surgeon: Rosalia James MD;  Location: [facility] OR;  Service:     ENDOSCOPY  05/2015    GERD/dysphagia - erosive gastritis, normal esophagus    ENDOSCOPY N/A 02/07/2023    Procedure: ESOPHAGOGASTRODUODENOSCOPY;  Surgeon: Lico Mcmillan MD;  Location: [facility]IN OR;  Service: Gastroenterology;  Laterality: N/A;  Esophagitis and Gastritis    FLEXIBLE SIGMOIDOSCOPY      FRACTURE SURGERY Left 1972    FX:  Femur    JOINT REPLACEMENT  2025    Right knee replacement    KNEE SURGERY      LIVER BIOPSY      SEPTOPLASTY      SINUS SURGERY      TONSILLECTOMY      TOTAL KNEE ARTHROPLASTY Right 2025    Procedure: TOTAL KNEE ARTHROPLASTY AND ALL ASSOCIATED PROCEDURES;  Surgeon: Sergo Agustin MD;  Location: [facility];  Service: Orthopedics;  Laterality: Right;    UPPER GASTROINTESTINAL ENDOSCOPY      VASECTOMY       Family History   Problem Relation Age of Onset    Hypertension Mother     Stroke Mother     Stroke Father     Heart failure Father     Benign prostatic hyperplasia Father     Colon cancer Neg Hx     Colon polyps Neg Hx     Malig Hyperthermia Neg Hx      Social History     Tobacco Use    Smoking status: Former     Current packs/day: 0.00     Average packs/day: 1 pack/day for 10.0 years (10.0 ttl pk-yrs)     Types: Cigarettes, Pipe, Cigars     Start date: 1965     Quit date: 1975     Years since quittin.3     Passive exposure: Past    Smokeless tobacco: Never    Tobacco comments:     Caffeine: 2 cups coffee daily   Vaping Use    Vaping status: Never Used   Substance Use Topics    Alcohol use: Yes     Alcohol/week: 1.0 standard drink of alcohol     Types: 1 Cans of beer per week     Comment: social minimal    Drug use: Never     Medications Prior to Admission   Medication Sig Dispense Refill Last Dose/Taking    esomeprazole (nexIUM) 40 MG capsule Take 1 capsule by mouth Every Morning Before Breakfast.   4/15/2025    finasteride (PROSCAR) 5 MG tablet TAKE 1 TABLET EVERY DAY 90 tablet 3 4/15/2025    lisinopril (PRINIVIL,ZESTRIL) 5 MG tablet TAKE 1 TABLET EVERY DAY 90 tablet 3 4/15/2025    metFORMIN ER (GLUCOPHAGE-XR) 750 MG 24 hr tablet TAKE 1 TABLET EVERY DAY WITH BREAKFAST 90 tablet 3 4/15/2025    simvastatin (ZOCOR) 20 MG tablet TAKE 1 TABLET EVERY NIGHT 90 tablet 3 4/15/2025    terazosin (HYTRIN) 10 MG capsule TAKE 1 CAPSULE EVERY NIGHT 90 capsule 3 4/15/2025    glucose blood test  strip Use as instructed 360 each 12      Allergies:  Patient has no known allergies.    REVIEW OF SYSTEMS:  Please see the above history of present illness for pertinent positives and negatives.  The remainder of the patient's systems have been reviewed and are negative.     Vital Signs  Temp:  [97.9 °F (36.6 °C)] 97.9 °F (36.6 °C)  Heart Rate:  [41] 41  Resp:  [14] 14  BP: (123)/(67) 123/67    Flowsheet Rows      Flowsheet Row First Filed Value   Admission Height --   Admission Weight 82.3 kg (181 lb 6.4 oz) Documented at 04/16/2025 0720             Physical Exam:  Physical Exam   Constitutional: Patient appears well-developed and well-nourished and in no acute distress   HEENT:   Head: Normocephalic and atraumatic.   Eyes:  Pupils are equal, round, and reactive to light. EOM are intact. Sclerae are anicteric and non-injected.  Mouth and Throat: Patient has moist mucous membranes. Oropharynx is clear of any erythema or exudate.     Neck: Neck supple. No JVD present. No thyromegaly present. No lymphadenopathy present.  Cardiovascular: Regular rate, regular rhythm, S1 normal and S2 normal.  Exam reveals no gallop and no friction rub.  No murmur heard.  Pulmonary/Chest: Lungs are clear to auscultation bilaterally. No respiratory distress. No wheezes. No rhonchi. No rales.   Abdominal: Soft. Bowel sounds are normal. No distension and no mass. There is no hepatosplenomegaly. There is no tenderness.   Musculoskeletal: Normal Muscle tone  Extremities: No edema. Pulses are palpable in all 4 extremities.  Neurological: Patient is alert and oriented to person, place, and time. Cranial nerves II-XII are grossly intact with no focal deficits.  Skin: Skin is warm. No rash noted. Nails show no clubbing.  No cyanosis or erythema.    Debilities/Disabilities Identified: None  Emotional Behavior: Appropriate     Results Review:   I reviewed the patient's new clinical results.    Lab Results (most recent)       Procedure Component  Value Units Date/Time    POC Glucose Once [[id number]]  (Abnormal) Collected: 04/16/25 0721    Specimen: Blood Updated: 04/16/25 0728     Glucose 143 mg/dL             Imaging Results (Most Recent)       None          reviewed    ECG/EMG Results (most recent)       None          reviewed    Assessment & Plan   Personal hx colon polyps/  colonoscopy      I discussed the patient's findings and my recommendations with patient.     Lico Mcmillan MD  04/16/25  08:20 EDT    Time: 10 min prior to procedure.

## 2025-04-16 NOTE — BRIEF OP NOTE
COLONOSCOPY WITH POLYPECTOMY  Progress Note    Rupesh Fox  4/16/2025    Pre-op Diagnosis:   Encounter for screening for malignant neoplasm of colon [Z12.11]       Post-Op Diagnosis Codes:     * Encounter for screening for malignant neoplasm of colon [Z12.11]     * Diverticulosis [K57.90]     * Colon polyp [K63.5]    Procedure(s):      Procedure(s):  COLONOSCOPY WITH POLYPECTOMY              Surgeon(s):  Lico Mcmillan MD    Anesthesia: Monitored Anesthesia Care    Staff:   Circulator: Veronica Dodge RN  Scrub Person: Adriana Kitchen       Estimated Blood Loss: none    Urine Voided: * No values recorded between 4/16/2025  8:15 AM and 4/16/2025  8:43 AM *    Specimens:                Specimens       ID Source Type Tests Collected By Collected At Frozen?    A Large Intestine, Transverse Colon Polyp TISSUE PATHOLOGY EXAM   Lico Mcmillan MD 4/16/25 0837               Drains: * No LDAs found *    Findings: Colon to TI Poor Prep  Sigmoid Diverticulosis  Polyp-Cold Snare        Complications: None          Lico Mcmillan MD     Date: 4/16/2025  Time: 08:46 EDT

## 2025-04-16 NOTE — ANESTHESIA POSTPROCEDURE EVALUATION
Patient: Rupesh Fox    Procedure Summary       Date: 04/16/25 Room / Location: [facility] ENDOSCOPY 1 /  [facility] OR    Anesthesia Start: 0815 Anesthesia Stop: 0849    Procedure: COLONOSCOPY WITH POLYPECTOMY Diagnosis:       Encounter for screening for malignant neoplasm of colon      Diverticulosis      Colon polyp      (Encounter for screening for malignant neoplasm of colon [Z12.11])    Surgeons: Lico Mcmillan MD Provider: Melanie Abreu CRNA    Anesthesia Type: MAC ASA Status: 3            Anesthesia Type: MAC    Vitals  Vitals Value Taken Time   /77 04/16/25 09:10   Temp 98.7 °F (37.1 °C) 04/16/25 08:50   Pulse 41 04/16/25 09:12   Resp 11 04/16/25 09:10   SpO2 97 % 04/16/25 09:12   Vitals shown include unfiled device data.        Post Anesthesia Care and Evaluation    Patient location during evaluation: bedside  Patient participation: complete - patient participated  Level of consciousness: awake and alert  Pain score: 0  Pain management: adequate    Airway patency: patent  Anesthetic complications: No anesthetic complications  PONV Status: none  Cardiovascular status: acceptable  Respiratory status: acceptable  Hydration status: acceptable

## 2025-04-17 LAB
CYTO UR: NORMAL
LAB AP CASE REPORT: NORMAL
PATH REPORT.FINAL DX SPEC: NORMAL
PATH REPORT.GROSS SPEC: NORMAL

## 2025-05-19 ENCOUNTER — OFFICE VISIT (OUTPATIENT)
Dept: GASTROENTEROLOGY | Facility: CLINIC | Age: 75
End: 2025-05-19
Payer: MEDICARE

## 2025-05-19 VITALS
WEIGHT: 185.4 LBS | DIASTOLIC BLOOD PRESSURE: 60 MMHG | HEIGHT: 73 IN | SYSTOLIC BLOOD PRESSURE: 104 MMHG | BODY MASS INDEX: 24.57 KG/M2

## 2025-05-19 DIAGNOSIS — K22.70 BARRETT'S ESOPHAGUS WITHOUT DYSPLASIA: ICD-10-CM

## 2025-05-19 DIAGNOSIS — K59.04 CHRONIC IDIOPATHIC CONSTIPATION: ICD-10-CM

## 2025-05-19 DIAGNOSIS — K21.00 GASTROESOPHAGEAL REFLUX DISEASE WITH ESOPHAGITIS WITHOUT HEMORRHAGE: Primary | ICD-10-CM

## 2025-05-19 NOTE — PROGRESS NOTES
PATIENT INFORMATION  Rupesh Fox       - 1950    CHIEF COMPLAINT  Chief Complaint   Patient presents with    Gastroesophageal reflux disease with esophagitis without he    Colon Polyps    Ann's Esophagus     Difficulty Swallowing       HISTORY OF PRESENT ILLNESS    Here today for Barretts follow-up     Bowels: Bowels daily, gas is biggest complaint and felt was worse on fiber. Poor prep for colonoscopy. Bowels have been ok and gas improved recently. Constipation after knee replacement, but bowels are back to normal. He is much more active s/p knee replacement.      GERD: Managed on esomeprazole BID AC. Drainage (20% of what it was) and hoarseness after breakfast and meals, and after gargling listerine, no response to pepcid previously. He feels ok. Encouraged to elevate HOB.     He has been evaluated by ENT before and even nasall sx without response, allergist in past.      2023 for reflux esophagitis, positive for gastritis, reflux with barretts, negative for HP, dysplasia.    Last colon 2017 normal with hemorrhoids. In  had 7 adenomas removed. One in between not sure whether there polyps. Will  plan to repeat colonoscopy this year.          REVIEWED PERTINENT RESULTS/ LABS  Lab Results   Component Value Date    CASEREPORT  2025     Surgical Pathology Report                         Case: [id number]                                  Authorizing Provider:  Lico Mcmillan        Collected:           2025 08:37 AM                                 MD Saqib                                                                   Ordering Location:     [facility]   Received:            2025 09:01 AM                                 OR                                                                           Pathologist:           Meka Hills MD                                                        Specimen:    Large Intestine, Transverse Colon                                                           FINALDX  04/16/2025     1.  Colon, transverse, polypectomy:         A.  Sessile serrated lesion.         B.  Negative for high-grade dysplasia or malignancy.       Lab Results   Component Value Date    HGB 12.3 (L) 04/02/2025    MCV 97 04/02/2025     04/02/2025    ALT 22 02/20/2025    AST 24 02/20/2025    HGBA1C 6.20 (H) 02/20/2025    INR 1.02 01/07/2025    TRIG 117 02/20/2025      No results found.    REVIEW OF SYSTEMS  Review of Systems      ACTIVE PROBLEMS  Patient Active Problem List    Diagnosis     Encounter for screening for malignant neoplasm of colon [Z12.11]     Primary osteoarthritis of right knee [M17.11]     Type 2 diabetes mellitus without complication, without long-term current use of insulin [E11.9]     Mixed hyperlipidemia [E78.2]     Primary hypertension [I10]     Gastroesophageal reflux disease with esophagitis without hemorrhage [K21.00]     Ann's esophagus without dysplasia [K22.70]     Acid reflux [K21.9]     Can't get food down [YLV9063]     Gastrointestinal ulcer due to Helicobacter pylori [K28.9, B96.81]     Hx of colonic polyps [Z86.0100]          PAST MEDICAL HISTORY  Past Medical History:   Diagnosis Date    Allergic rhinitis     Arthritis of back     Arthritis of neck     Ann esophagus Feb 2023    Barretts esophagus     Benign prostatic hyperplasia without lower urinary tract symptoms     Bradycardia, unspecified     Cancer melanoma    Cataract 2022    Cellulitis and abscess of toe     Cervicalgia     Colon polyp     Deviated septum     Diabetes mellitus     Diabetic eye exam     5/14, 6/4/15 - negative, 6/16 - small change in vessel, 5/17 - negative, f/u 12 mo, 3/18 - negative, 4/19 - negative    Dysphagia     Enlarged prostate without lower urinary tract symptoms (luts)     Essential (primary) hypertension     Fracture, femur     Fracture, foot 1972    GERD (gastroesophageal reflux disease)     Gluteal tendinitis, unspecified  hip     Hearing loss     Hip arthrosis     Hyperlipidemia, unspecified     Knee swelling     Low back strain     Pain in left knee     Pain in right shoulder     Problem with sexual function     Pure hypercholesterolemia     Sprain     Sprain of hip: Sprain of other specified sites of hip and thigh    Tear of meniscus of knee 2024    Tinnitus, unspecified ear     Trochanteric bursitis, left hip     Type 2 diabetes mellitus without complication          SURGICAL HISTORY  Past Surgical History:   Procedure Laterality Date    COLONOSCOPY      COLONOSCOPY N/A 12/06/2017    Procedure: COLONOSCOPY;  Surgeon: Rosalia James MD;  Location: [facility] OR;  Service:     COLONOSCOPY W/ POLYPECTOMY N/A 4/16/2025    Procedure: COLONOSCOPY WITH POLYPECTOMY;  Surgeon: Lico Mcmillan MD;  Location: [facility] OR;  Service: Gastroenterology;  Laterality: N/A;  Transverse polyp; Diverticulosis; Poor prep    ENDOSCOPY  05/2015    GERD/dysphagia - erosive gastritis, normal esophagus    ENDOSCOPY N/A 02/07/2023    Procedure: ESOPHAGOGASTRODUODENOSCOPY;  Surgeon: Lico Mcmillan MD;  Location: [facility] OR;  Service: Gastroenterology;  Laterality: N/A;  Esophagitis and Gastritis    FLEXIBLE SIGMOIDOSCOPY      FRACTURE SURGERY Left 1972    FX: Femur    JOINT REPLACEMENT  Jan 21, 2025    Right knee replacement    KNEE SURGERY      LIVER BIOPSY      SEPTOPLASTY      SINUS SURGERY      TONSILLECTOMY      TOTAL KNEE ARTHROPLASTY Right 01/21/2025    Procedure: TOTAL KNEE ARTHROPLASTY AND ALL ASSOCIATED PROCEDURES;  Surgeon: Sergo Agustin MD;  Location: [facility] OR;  Service: Orthopedics;  Laterality: Right;    UPPER GASTROINTESTINAL ENDOSCOPY  2015    VASECTOMY           FAMILY HISTORY  Family History   Problem Relation Age of Onset    Hypertension Mother     Stroke Mother     Stroke Father     Heart failure Father     Benign prostatic hyperplasia Father     Colon cancer Neg Hx     Colon polyps Neg Hx     Malig  "Hyperthermia Neg Hx          SOCIAL HISTORY  Social History     Occupational History    Occupation: retired   Tobacco Use    Smoking status: Former     Current packs/day: 0.00     Average packs/day: 1 pack/day for 10.0 years (10.0 ttl pk-yrs)     Types: Cigarettes, Pipe, Cigars     Start date: 1965     Quit date: 1975     Years since quittin.4     Passive exposure: Past    Smokeless tobacco: Never    Tobacco comments:     Caffeine: 2 cups coffee daily   Vaping Use    Vaping status: Never Used   Substance and Sexual Activity    Alcohol use: Yes     Alcohol/week: 1.0 standard drink of alcohol     Types: 1 Cans of beer per week     Comment: social minimal    Drug use: Never    Sexual activity: Yes         CURRENT MEDICATIONS    Current Outpatient Medications:     esomeprazole (nexIUM) 40 MG capsule, Take 1 capsule by mouth Every Morning Before Breakfast., Disp: , Rfl:     finasteride (PROSCAR) 5 MG tablet, TAKE 1 TABLET EVERY DAY, Disp: 90 tablet, Rfl: 3    glucose blood test strip, Use as instructed, Disp: 360 each, Rfl: 12    lisinopril (PRINIVIL,ZESTRIL) 5 MG tablet, TAKE 1 TABLET EVERY DAY, Disp: 90 tablet, Rfl: 3    metFORMIN ER (GLUCOPHAGE-XR) 750 MG 24 hr tablet, TAKE 1 TABLET EVERY DAY WITH BREAKFAST, Disp: 90 tablet, Rfl: 3    simvastatin (ZOCOR) 20 MG tablet, TAKE 1 TABLET EVERY NIGHT, Disp: 90 tablet, Rfl: 3    terazosin (HYTRIN) 10 MG capsule, TAKE 1 CAPSULE EVERY NIGHT, Disp: 90 capsule, Rfl: 3    ALLERGIES  Patient has no known allergies.    VITALS  Vitals:    25 0821   BP: 104/60   BP Location: Left arm   Patient Position: Sitting   Cuff Size: Large Adult   Weight: 84.1 kg (185 lb 6.4 oz)   Height: 185.4 cm (72.99\")       PHYSICAL EXAM  Debilities/Disabilities Identified: None  Emotional Behavior: Appropriate  Wt Readings from Last 3 Encounters:   25 84.1 kg (185 lb 6.4 oz)   25 82.3 kg (181 lb 6.4 oz)   25 84.4 kg (186 lb)     Ht Readings from Last 1 Encounters: " "  05/19/25 185.4 cm (72.99\")     Body mass index is 24.47 kg/m².  Physical Exam  Constitutional:       General: He is not in acute distress.     Appearance: Normal appearance. He is not ill-appearing.   HENT:      Head: Normocephalic and atraumatic.      Mouth/Throat:      Mouth: Mucous membranes are moist.      Pharynx: No posterior oropharyngeal erythema.   Eyes:      General: No scleral icterus.  Cardiovascular:      Rate and Rhythm: Normal rate and regular rhythm.      Heart sounds: Normal heart sounds.   Pulmonary:      Effort: Pulmonary effort is normal.      Breath sounds: Normal breath sounds.   Abdominal:      General: Abdomen is flat. Bowel sounds are normal. There is no distension.      Palpations: Abdomen is soft. There is no mass.      Tenderness: There is no abdominal tenderness. There is no guarding or rebound. Negative signs include Pozo's sign.      Hernia: No hernia is present.   Musculoskeletal:      Cervical back: Neck supple.   Skin:     General: Skin is warm.      Capillary Refill: Capillary refill takes less than 2 seconds.   Neurological:      General: No focal deficit present.      Mental Status: He is alert and oriented to person, place, and time.   Psychiatric:         Mood and Affect: Mood normal.         Behavior: Behavior normal.         Thought Content: Thought content normal.         Judgment: Judgment normal.         CLINICAL DATA REVIEWED   reviewed previous lab results and integrated with today's visit, reviewed notes from other physicians and/or last GI encounter, reviewed previous endoscopy results and available photos, reviewed surgical pathology results from previous biopsies    ASSESSMENT  Diagnoses and all orders for this visit:    Gastroesophageal reflux disease with esophagitis without hemorrhage    Ann's esophagus without dysplasia    Chronic idiopathic constipation          PLAN    Constipation improved with increased activity, reviewed PRN miralax if " needed  Barretts: Continue esomeprazole BID.  EGD 2/2028  Colon 4/2030    Return in about 1 year (around 5/19/2026).    I have discussed the above plan with the patient.  They verbalize understanding and are in agreement with the plan.  They have been advised to contact the office for any questions, concerns, or changes related to their health.

## 2025-05-21 RX ORDER — ESOMEPRAZOLE MAGNESIUM 40 MG/1
40 CAPSULE, DELAYED RELEASE ORAL 2 TIMES DAILY
Qty: 180 CAPSULE | Refills: 3 | Status: SHIPPED | OUTPATIENT
Start: 2025-05-21

## 2025-06-02 ENCOUNTER — OFFICE VISIT (OUTPATIENT)
Dept: CARDIOLOGY | Facility: CLINIC | Age: 75
End: 2025-06-02
Payer: MEDICARE

## 2025-06-02 VITALS
HEART RATE: 50 BPM | DIASTOLIC BLOOD PRESSURE: 60 MMHG | WEIGHT: 184 LBS | BODY MASS INDEX: 24.39 KG/M2 | SYSTOLIC BLOOD PRESSURE: 108 MMHG | HEIGHT: 73 IN

## 2025-06-02 DIAGNOSIS — E78.2 MIXED HYPERLIPIDEMIA: ICD-10-CM

## 2025-06-02 DIAGNOSIS — I10 PRIMARY HYPERTENSION: Primary | ICD-10-CM

## 2025-06-02 DIAGNOSIS — I51.7 LEFT ATRIAL ENLARGEMENT: ICD-10-CM

## 2025-06-02 DIAGNOSIS — E11.9 TYPE 2 DIABETES MELLITUS WITHOUT COMPLICATION, WITHOUT LONG-TERM CURRENT USE OF INSULIN: ICD-10-CM

## 2025-06-02 PROCEDURE — 99214 OFFICE O/P EST MOD 30 MIN: CPT | Performed by: INTERNAL MEDICINE

## 2025-06-02 PROCEDURE — 93000 ELECTROCARDIOGRAM COMPLETE: CPT | Performed by: INTERNAL MEDICINE

## 2025-06-02 PROCEDURE — 3078F DIAST BP <80 MM HG: CPT | Performed by: INTERNAL MEDICINE

## 2025-06-02 PROCEDURE — 3074F SYST BP LT 130 MM HG: CPT | Performed by: INTERNAL MEDICINE

## 2025-06-02 NOTE — PROGRESS NOTES
Date of Office Visit: 2025  Encounter Provider: Lissy Oneal MD  Place of Service: [facility] CARDIOLOGY  Patient Name: Rupesh Fox  :1950      Patient ID:  Rupesh Fox is a 74 y.o. male is here for  followup for bradycardia, hypertension.        History of Present Illness    He has a history of hypertension, GERD, diabetes mellitus, laryngopharyngeal reflux disease, history of rheumatic fever as a child without sequela, history melanoma, hyperlipidemia.      His dad had heart failure and stroke.  His mom also at her strokes and hypertension.  He is , has 2 children, quit smoking , has 2 beers per month, 2 coffees per day and no drugs.  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    Echocardiogram done 2023 showed moderate to severe left atrial enlargement with ejection fraction 61%, normal diastolic function.  Abdominal aorta screening done 2023 was normal.    Labs on 2025 show glucose 154 with otherwise normal CMP, hemoglobin A1c 6.2%, normal TSH, total cholesterol 131, HDL 50, LDL 60, triglycerides 117, VLDL 21, hemoglobin 11.7 with otherwise normal CBC.    He has not been exercise by running or even really walking due to knee pain.  He just recently started walking 1 mile 3 times a week.  He had a right TKA 2025.  He needs a left TKA.  He has had some mild exertional dyspnea but no exertional chest tightness or pressure.  He does not feel his heart racing or skipping.  He has had no dizziness, syncope or falls.  He has no orthopnea or PND.  He is taking his medications as directed.    Past Medical History:   Diagnosis Date    Allergic rhinitis     Arthritis of back     Arthritis of neck     Ann esophagus 2023    Barretts esophagus     Benign prostatic hyperplasia without lower urinary tract symptoms     Bradycardia, unspecified     Cancer melanoma     Cataract 2022    Cellulitis and abscess of toe     Cervicalgia     Colon polyp     Deviated septum     Diabetes mellitus     Diabetic eye exam     5/14, 6/4/15 - negative, 6/16 - small change in vessel, 5/17 - negative, f/u 12 mo, 3/18 - negative, 4/19 - negative    Dysphagia     Enlarged prostate without lower urinary tract symptoms (luts)     Essential (primary) hypertension     Fracture, femur     Fracture, foot 1972    GERD (gastroesophageal reflux disease)     Gluteal tendinitis, unspecified hip     Hearing loss     Hip arthrosis     Hyperlipidemia, unspecified     Knee swelling     Low back strain     Pain in left knee     Pain in right shoulder     Problem with sexual function     Pure hypercholesterolemia     Sprain     Sprain of hip: Sprain of other specified sites of hip and thigh    Tear of meniscus of knee 2024    Tinnitus, unspecified ear     Trochanteric bursitis, left hip     Type 2 diabetes mellitus without complication          Past Surgical History:   Procedure Laterality Date    COLONOSCOPY      COLONOSCOPY N/A 12/06/2017    Procedure: COLONOSCOPY;  Surgeon: Rosalia James MD;  Location: [facility] OR;  Service:     COLONOSCOPY W/ POLYPECTOMY N/A 4/16/2025    Procedure: COLONOSCOPY WITH POLYPECTOMY;  Surgeon: Lico Mcmillan MD;  Location: [facility] OR;  Service: Gastroenterology;  Laterality: N/A;  Transverse polyp; Diverticulosis; Poor prep    ENDOSCOPY  05/2015    GERD/dysphagia - erosive gastritis, normal esophagus    ENDOSCOPY N/A 02/07/2023    Procedure: ESOPHAGOGASTRODUODENOSCOPY;  Surgeon: Lico Mcmillan MD;  [place] [facility] [place];  Service: Gastroenterology;  Laterality: N/A;  Esophagitis and Gastritis    FLEXIBLE SIGMOIDOSCOPY      FRACTURE SURGERY Left 1972    FX: Femur    JOINT REPLACEMENT  Jan 21, 2025    Right knee replacement    KNEE SURGERY      LIVER BIOPSY      SEPTOPLASTY      SINUS SURGERY      TONSILLECTOMY      TOTAL KNEE ARTHROPLASTY Right 01/21/2025     Procedure: TOTAL KNEE ARTHROPLASTY AND ALL ASSOCIATED PROCEDURES;  Surgeon: Sergo Agustin MD;  Location: [facility];  Service: Orthopedics;  Laterality: Right;    UPPER GASTROINTESTINAL ENDOSCOPY      VASECTOMY         Current Outpatient Medications on File Prior to Visit   Medication Sig Dispense Refill    esomeprazole (nexIUM) 40 MG capsule TAKE 1 CAPSULE TWICE DAILY 180 capsule 3    finasteride (PROSCAR) 5 MG tablet TAKE 1 TABLET EVERY DAY 90 tablet 3    glucose blood test strip Use as instructed 360 each 12    lisinopril (PRINIVIL,ZESTRIL) 5 MG tablet TAKE 1 TABLET EVERY DAY 90 tablet 3    metFORMIN ER (GLUCOPHAGE-XR) 750 MG 24 hr tablet TAKE 1 TABLET EVERY DAY WITH BREAKFAST 90 tablet 3    simvastatin (ZOCOR) 20 MG tablet TAKE 1 TABLET EVERY NIGHT 90 tablet 3    terazosin (HYTRIN) 10 MG capsule TAKE 1 CAPSULE EVERY NIGHT 90 capsule 3     No current facility-administered medications on file prior to visit.       Social History     Socioeconomic History    Marital status:    Tobacco Use    Smoking status: Former     Current packs/day: 0.00     Average packs/day: 1 pack/day for 10.0 years (10.0 ttl pk-yrs)     Types: Cigarettes, Pipe, Cigars     Start date: 1965     Quit date: 1975     Years since quittin.4     Passive exposure: Past    Smokeless tobacco: Never    Tobacco comments:     Caffeine: 2 cups coffee daily   Vaping Use    Vaping status: Never Used   Substance and Sexual Activity    Alcohol use: Yes     Alcohol/week: 1.0 standard drink of alcohol     Types: 1 Cans of beer per week     Comment: social minimal    Drug use: Never    Sexual activity: Yes             Procedures    ECG 12 Lead    Date/Time: 2025 9:56 AM  Performed by: Lissy Oneal MD    Authorized by: Lissy Oneal MD  Comparison: compared with previous ECG   Similar to previous ECG  Rhythm: sinus rhythm  Ectopy: atrial premature contractions    Clinical impression: non-specific ECG             "  Objective:      Vitals:    06/02/25 0939   BP: 108/60   Pulse: 50   Weight: 83.5 kg (184 lb)   Height: 185.4 cm (73\")     Body mass index is 24.28 kg/m².    Vitals reviewed.   Constitutional:       General: Not in acute distress.     Appearance: Not diaphoretic.   Neck:      Vascular: No carotid bruit or JVD.   Pulmonary:      Effort: Pulmonary effort is normal.      Breath sounds: Normal breath sounds.   Cardiovascular:      Normal rate. Regular rhythm.      Murmurs: There is no murmur.      No gallop.  No rub.   Pulses:     Intact distal pulses.      Carotid: 2+ bilaterally.     Radial: 2+ bilaterally.     Dorsalis pedis: 2+ bilaterally.     Posterior tibial: 2+ bilaterally.  Edema:     Peripheral edema absent.   Neurological:      Cranial Nerves: No cranial nerve deficit.         Lab Review:       Assessment:      Diagnosis Plan   1. Primary hypertension  Adult Transthoracic Echo Complete W/ Cont if Necessary Per Protocol      2. Mixed hyperlipidemia  Adult Transthoracic Echo Complete W/ Cont if Necessary Per Protocol      3. Type 2 diabetes mellitus without complication, without long-term current use of insulin  Adult Transthoracic Echo Complete W/ Cont if Necessary Per Protocol      4. Left atrial enlargement  Adult Transthoracic Echo Complete W/ Cont if Necessary Per Protocol          Bradycardia, asymptomatic.   Dyspnea, nonexertional.  Hypertension, goal <120/80.   Hyperlipidemia, on simvastatin  Diabetes mellitus type 2  Laryngopharyngeal reflux disease and GERD-follows with ENT.  Causes hoarseness of his voice, throat soreness and short windedness.  History of melanoma  Left atrial enlargement, etiology unknown.     Plan:       See Keena in 1 year with repeat echocardiogram the week before, no medication changes, advised exercise.  I also advised him to watch for heart racing.      STOP-Bang Score  Have you been diagnosed with Sleep Apnea?: no  Snoring?: no  Tired?: no  Observed?: no  Pressure?: yes  Stop " "Score: 1  Body Mass Index more than 35 kg/m2?: no  Age older than 50 year old?: yes  Neck large? \">17\"/43cm-M, >16\"/41cm-F: no  Gender=Male?: yes  Total Stop-Bang Score: 3      "

## 2025-07-17 ENCOUNTER — OFFICE VISIT (OUTPATIENT)
Age: 75
End: 2025-07-17
Payer: MEDICARE

## 2025-07-17 VITALS — BODY MASS INDEX: 24.39 KG/M2 | OXYGEN SATURATION: 96 % | WEIGHT: 184 LBS | HEART RATE: 54 BPM | HEIGHT: 73 IN

## 2025-07-17 DIAGNOSIS — M72.2 PLANTAR FASCIITIS OF RIGHT FOOT: ICD-10-CM

## 2025-07-17 DIAGNOSIS — M77.41 METATARSALGIA, RIGHT FOOT: ICD-10-CM

## 2025-07-17 DIAGNOSIS — Z96.651 S/P TOTAL KNEE REPLACEMENT, RIGHT: ICD-10-CM

## 2025-07-17 DIAGNOSIS — M21.861 ACQUIRED POSTERIOR EQUINUS OF RIGHT LOWER EXTREMITY: ICD-10-CM

## 2025-07-17 DIAGNOSIS — M79.671 RIGHT FOOT PAIN: Primary | ICD-10-CM

## 2025-07-18 NOTE — PROGRESS NOTES
07/17/2025  Foot and Ankle Surgery - Established Patient/Follow-up  Provider: Dr. Andre Shah DPM  Location: [facility]    Subjective:  Rupesh Fox is a 74 y.o. male.     Chief Complaint   Patient presents with    Right Foot - Pain, Follow-up    Follow-Up     PCP: Leland Carson MD  Last PCP Visit:  01/17/25       History of Present Illness  The patient presents for foot pain.    He reports experiencing the same foot pain as in 11/2024, which was previously diagnosed as age-related loss of natural elasticity. He has been using Powerstep inserts and performing exercises for his calf and back of the thigh, which have provided some relief. However, he underwent a total knee replacement in 01/2025, which led to a period of inactivity and subsequent physical therapy. During this time, he was unable to perform stretching exercises. As he has resumed his routine and become more active, the foot pain has returned. He is currently performing exercises for his calf and back of the leg, but notes that the calf exercise causes significant knee pain. He also experiences tingling or numbness in his leg when it is horizontal at night, which disrupts his sleep. His walking shoes are six months old, but he has not been walking much recently. He is attempting to resume his morning walking routine. He is currently doing home physical therapy and has stopped doing flexion exercises. He is only doing weightbearing exercises twice a week for maintenance. He experienced cramping in his lower leg after the knee surgery, which was particularly bothersome when he tried to relax at the end of the day. He is considering joining a pool for water therapy. 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      No Known Allergies    Current Outpatient Medications on File Prior to Visit   Medication Sig Dispense Refill    esomeprazole (nexIUM) 40 MG capsule TAKE 1 CAPSULE TWICE DAILY 180  "capsule 3    finasteride (PROSCAR) 5 MG tablet TAKE 1 TABLET EVERY DAY 90 tablet 3    glucose blood test strip Use as instructed 360 each 12    lisinopril (PRINIVIL,ZESTRIL) 5 MG tablet TAKE 1 TABLET EVERY DAY 90 tablet 3    metFORMIN ER (GLUCOPHAGE-XR) 750 MG 24 hr tablet TAKE 1 TABLET EVERY DAY WITH BREAKFAST 90 tablet 3    simvastatin (ZOCOR) 20 MG tablet TAKE 1 TABLET EVERY NIGHT 90 tablet 3    terazosin (HYTRIN) 10 MG capsule TAKE 1 CAPSULE EVERY NIGHT 90 capsule 3     No current facility-administered medications on file prior to visit.       Objective   Pulse 54   Ht 185.4 cm (73\")   Wt 83.5 kg (184 lb)   SpO2 96%   BMI 24.28 kg/m²     Foot/Ankle Exam  Physical Exam  GENERAL  Appearance:  appears stated age  Orientation:  AAOx3  Affect:  appropriate     VASCULAR      Right Foot Vascularity   Normal vascular exam    Dorsalis pedis:  2+  Posterior tibial:  2+  Skin temperature:  warm  Edema grading:  None  CFT:  < 3 seconds  Pedal hair growth:  Present  Varicosities:  none     NEUROLOGIC      Right Foot Neurologic   Light touch sensation: normal  Hot/Cold sensation: normal  Achilles reflex:  2+     MUSCULOSKELETAL      Right Foot Musculoskeletal   Ecchymosis:  none  Tenderness:  dorsal foot, metatarsals tenderness, plantar fascia tenderness, toe 2 tenderness, toe 3 tenderness, toe 4 tenderness and toe 5 tenderness    Arch:  Normal  Hammertoe:  Second toe, third toe, fourth toe and fifth toe     MUSCLE STRENGTH      Right Foot Muscle Strength   Normal strength    Foot dorsiflexion:  5  Foot plantar flexion:  5  Foot inversion:  5  Foot eversion:  5     DERMATOLOGIC       Right Foot Dermatologic   Skin  Right foot skin is intact.      TESTS      Right Foot Tests   Calcaneal squeeze: negative  Talar tilt: negative     Right foot additional comments: Prominent soft tissue rigidity involving the forefoot.  Moderate equinus contracture with knee extended and flexed.  Discomfort along the medial plantar fascial " band.  Semirigid hammer digit deformities.    7/17/25: Physical exam is relatively unchanged with mild rigidity involving the knee and difficulty with full extension.  Mild swelling and discomfort about the knee.  Prominent equinus involving the right lower extremity.  No obvious swelling or signs of inflammation involving the foot.  Progressive deformity or instability.  Neurovascular status is grossly intact      Results  Imaging  X-ray of the foot showed age-related loss of natural elasticity.    Assessment & Plan   Diagnoses and all orders for this visit:    1. Right foot pain (Primary)    2. Metatarsalgia, right foot    3. Plantar fasciitis of right foot    4. Acquired posterior equinus of right lower extremity    5. S/P total knee replacement, right      Assessment & Plan    The foot pain is likely due to scar tissue formation following his knee surgery, which is causing tightness in the back of his leg. This is not indicative of tarsal tunnel syndrome. The numbness and tingling sensations are not suggestive of a progressive nerve issue, but rather a result of stress on the nerves. He was advised to engage in low-impact exercises such as using a recumbent bike, elliptical machine, water therapy, water aerobics, or swimming. He was also encouraged to continue with his stretching exercises and to monitor his resistance and high-impact activities like walking. He was cautioned against walking uphill and on uneven terrain. If his condition worsens or he experiences more problems, he should contact the office sooner.Reviewed proper basic stretching and manual therapy exercises along with appropriate shoes and activity.  Discussed proper use and/or avoidance of OTC anti-inflammatories.  Patient is to call with any additional issues or concerns.  Greater than 20 minutes was spent before, during, and after evaluation for patient care.    The encounter note is created with the use of AI technology.  I do apologize if  there are typos and/or confusion within the note.  Please feel free to contact me or my office with any questions or concerns.    Follow-up  A follow-up appointment is scheduled for 2 months from now.             Patient or patient representative verbalized consent for the use of Ambient Listening during the visit with  ROSALVA Shah DPM for chart documentation. 7/18/2025  13:14 EDT    ROSALVA Shah DPM

## (undated) DEVICE — SINGLE-USE BIOPSY FORCEPS: Brand: RADIAL JAW 4

## (undated) DEVICE — DECANTER BAG 9": Brand: MEDLINE INDUSTRIES, INC.

## (undated) DEVICE — JACKT LAB F/R KNIT CUFF/COLR XLG BLU

## (undated) DEVICE — PAD,NON-ADHERENT,3X8,STERILE,LF,1/PK: Brand: MEDLINE

## (undated) DEVICE — PUMP PAIN AUTOFUSER AUTO SELCT NOBOLUS 1TO14ML/HR 550ML DISP

## (undated) DEVICE — BNDG,ELSTC,MATRIX,STRL,4"X5YD,LF,HOOK&LP: Brand: MEDLINE

## (undated) DEVICE — INTENDED USE FOR SURGICAL MARKING ON INTACT SKIN, ALSO PROVIDES A PERMANENT METHOD OF IDENTIFYING OBJECTS IN THE OPERATING ROOM: Brand: WRITESITE® REGULAR TIP SKIN MARKER

## (undated) DEVICE — KT ORCA ORCAPOD DISP STRL

## (undated) DEVICE — SCRW HEX CORT HD 3.5X38MM
Type: IMPLANTABLE DEVICE | Site: KNEE | Status: NON-FUNCTIONAL
Removed: 2025-01-21

## (undated) DEVICE — BITEBLOCK OMNI BLOC

## (undated) DEVICE — Device

## (undated) DEVICE — WEBRIL* CAST PADDING: Brand: DEROYAL

## (undated) DEVICE — BW-412T DISP COMBO CLEANING BRUSH: Brand: SINGLE USE COMBINATION CLEANING BRUSH

## (undated) DEVICE — THE SINGLE USE ETRAP – POLYP TRAP IS USED FOR SUCTION RETRIEVAL OF ENDOSCOPICALLY REMOVED POLYPS.: Brand: ETRAP

## (undated) DEVICE — SYS IRR SURGIPHOR A/MIC RTU BO PVPI 450ML STRL

## (undated) DEVICE — FRCP BX RADJAW4 NDL 2.8 240CM LG OG BX40

## (undated) DEVICE — SUCTION CANISTER, 3000CC,SAFELINER: Brand: DEROYAL

## (undated) DEVICE — HYBRID TUBING/CAP SET FOR OLYMPUS® SCOPES: Brand: ERBE

## (undated) DEVICE — SOL IRR H2O BO 1000ML STRL

## (undated) DEVICE — ARGYLE FRAZIER SURGICAL SUCTION INSTRUMENT 10 FR/CH (3.3 MM): Brand: ARGYLE

## (undated) DEVICE — GLV SURG SENSICARE PI PF LF 7 GRN STRL

## (undated) DEVICE — KT MIX CMT PALAMIX/UNO VAC WO/CMT

## (undated) DEVICE — SOL ISO/ALC RUB 70PCT 4OZ

## (undated) DEVICE — FOAM BUMP ROUND LARGE: Brand: MEDLINE INDUSTRIES, INC.

## (undated) DEVICE — DRP SURG U/DRP INVISISHIELD PA 48X52IN

## (undated) DEVICE — SPNG GZ WOVN 4X4IN 12PLY 10/BX STRL

## (undated) DEVICE — GLV SURG SENSICARE PI MIC PF SZ8 LF STRL

## (undated) DEVICE — GLV SURG SENSICARE PF POLYISPRN SZ8 LF

## (undated) DEVICE — DISPOSABLE TOURNIQUET CUFF 34"X4", 1-LINE, BLUE, STERILE, 1EA/PK, 10PK/CS: Brand: ASP MEDICAL

## (undated) DEVICE — MSK ENDO PORT O2 POM ELITE CURAPLEX A/

## (undated) DEVICE — CAP GUIDE PSI/SIGNATURE/I-ASSIST

## (undated) DEVICE — SYS CLS SKIN PREMIERPRO EXOFINFUSION 22CM

## (undated) DEVICE — INTENDED FOR TISSUE SEPARATION, AND OTHER PROCEDURES THAT REQUIRE A SHARP SURGICAL BLADE TO PUNCTURE OR CUT.: Brand: BARD-PARKER ® STAINLESS STEEL BLADES

## (undated) DEVICE — WRAP KN COMPR COLD/THERP

## (undated) DEVICE — ADAPT CLN BIOGUARD AIR/H2O DISP

## (undated) DEVICE — ANTIBACTERIAL UNDYED BRAIDED (POLYGLACTIN 910), SYNTHETIC ABSORBABLE SUTURE: Brand: COATED VICRYL

## (undated) DEVICE — YANKAUER,BULB TIP,W/O VENT,RIGID,STERILE: Brand: MEDLINE

## (undated) DEVICE — MAT FLR ABSORBENT LG 4FT 10 2.5FT

## (undated) DEVICE — VIAL FORMALIN CAP 10P 40ML

## (undated) DEVICE — PK TOTL 90

## (undated) DEVICE — LINER SURG CANSTR SXN S/RIGD 1500CC

## (undated) DEVICE — GOWN ISOL W/THUMB UNIV BLU BX/15

## (undated) DEVICE — SNAR POLYP CAPTIVATOR/COLD STFF RND 10MM 240CM

## (undated) DEVICE — GLV SURG SENSICARE PI LF PF 7.0

## (undated) DEVICE — TRANSFER SET 3": Brand: MEDLINE INDUSTRIES, INC.

## (undated) DEVICE — ENDOSCOPY PORT CONNECTOR FOR OLYMPUS® SCOPES: Brand: ERBE

## (undated) DEVICE — IMMOB KN 3PNL DLX CANVS 22IN BLU

## (undated) DEVICE — VIAL FORMLN CAP 10PCT 20ML

## (undated) DEVICE — SYR LL W/SCALE/MARK 3ML STRL

## (undated) DEVICE — MASK,FACE,SHIELD,BLUE,ANTI FOG,TIES: Brand: MEDLINE

## (undated) DEVICE — JACKT LAB KNIT COLR LG BLU

## (undated) DEVICE — APPL CHLORAPREP HI/LITE 26ML ORNG

## (undated) DEVICE — SUT VIC 2/0 CT1 CR8 18IN J839D

## (undated) DEVICE — Device: Brand: DEFENDO AIR/WATER/SUCTION AND BIOPSY VALVE

## (undated) DEVICE — Device: Brand: XPERIENCE

## (undated) DEVICE — 3M™ DURAPORE™ SURGICAL TAPE 2 INCHES X 10YARDS (5.0CM X 9.1M) 6ROLLS/CARTON 10CARTONS/CASE 1538-2: Brand: 3M™ DURAPORE™

## (undated) DEVICE — SYR LL 3CC

## (undated) DEVICE — DUAL CUT SAGITTAL BLADE

## (undated) DEVICE — SYR LUER SLPTP 50ML

## (undated) DEVICE — KT CATH NERV BLCK ONQ QUIKBLCK 20GA 100MM